# Patient Record
Sex: FEMALE | Race: BLACK OR AFRICAN AMERICAN | Employment: OTHER | URBAN - METROPOLITAN AREA
[De-identification: names, ages, dates, MRNs, and addresses within clinical notes are randomized per-mention and may not be internally consistent; named-entity substitution may affect disease eponyms.]

---

## 2017-01-19 ENCOUNTER — HOSPITAL ENCOUNTER (OUTPATIENT)
Dept: MAMMOGRAPHY | Age: 62
Discharge: HOME | End: 2017-01-19
Attending: FAMILY MEDICINE
Payer: MEDICARE

## 2017-01-19 ENCOUNTER — HOSPITAL ENCOUNTER (OUTPATIENT)
Dept: ULTRASOUND IMAGING | Age: 62
Discharge: HOME | End: 2017-01-19
Attending: FAMILY MEDICINE
Payer: MEDICARE

## 2017-01-19 DIAGNOSIS — R92.8 ABNORMAL MAMMOGRAM OF LEFT BREAST: ICD-10-CM

## 2017-01-19 PROCEDURE — 76642 ULTRASOUND BREAST LIMITED: CPT

## 2017-01-19 PROCEDURE — 77065 DX MAMMO INCL CAD UNI: CPT

## 2017-01-24 ENCOUNTER — HOSPITAL ENCOUNTER (OUTPATIENT)
Dept: CT IMAGING | Age: 62
Discharge: HOME | End: 2017-01-24
Attending: FAMILY MEDICINE
Payer: MEDICARE

## 2017-01-24 ENCOUNTER — APPOINTMENT (OUTPATIENT)
Dept: MAMMOGRAPHY | Age: 62
End: 2017-01-24
Attending: FAMILY MEDICINE
Payer: MEDICARE

## 2017-01-24 DIAGNOSIS — Z80.9 FAMILY HISTORY OF CANCER: ICD-10-CM

## 2017-01-24 LAB — CREAT BLD-MCNC: 1.3 MG/DL (ref 0.6–1.3)

## 2017-01-24 PROCEDURE — 74011000258 HC RX REV CODE- 258: Performed by: FAMILY MEDICINE

## 2017-01-24 PROCEDURE — 74011636320 HC RX REV CODE- 636/320: Performed by: FAMILY MEDICINE

## 2017-01-24 PROCEDURE — 82565 ASSAY OF CREATININE: CPT

## 2017-01-24 PROCEDURE — 71260 CT THORAX DX C+: CPT

## 2017-01-24 PROCEDURE — 74177 CT ABD & PELVIS W/CONTRAST: CPT

## 2017-01-24 RX ORDER — SODIUM CHLORIDE 0.9 % (FLUSH) 0.9 %
10 SYRINGE (ML) INJECTION
Status: COMPLETED | OUTPATIENT
Start: 2017-01-24 | End: 2017-01-24

## 2017-01-24 RX ADMIN — SODIUM CHLORIDE 100 ML: 900 INJECTION, SOLUTION INTRAVENOUS at 11:20

## 2017-01-24 RX ADMIN — IOPAMIDOL 100 ML: 755 INJECTION, SOLUTION INTRAVENOUS at 11:20

## 2017-01-24 RX ADMIN — Medication 10 ML: at 11:20

## 2017-01-24 RX ADMIN — IOHEXOL 50 ML: 240 INJECTION, SOLUTION INTRATHECAL; INTRAVASCULAR; INTRAVENOUS; ORAL at 11:20

## 2017-01-26 ENCOUNTER — HOSPITAL ENCOUNTER (OUTPATIENT)
Dept: MAMMOGRAPHY | Age: 62
Discharge: HOME | End: 2017-01-26
Attending: FAMILY MEDICINE
Payer: MEDICARE

## 2017-01-26 DIAGNOSIS — N63.20 BREAST MASS, LEFT: ICD-10-CM

## 2017-01-26 PROCEDURE — 76642 ULTRASOUND BREAST LIMITED: CPT

## 2017-01-26 RX ORDER — LIDOCAINE HYDROCHLORIDE AND EPINEPHRINE 10; 10 MG/ML; UG/ML
8 INJECTION, SOLUTION INFILTRATION; PERINEURAL ONCE
Status: DISCONTINUED | OUTPATIENT
Start: 2017-01-26 | End: 2017-01-26

## 2017-01-26 RX ORDER — LIDOCAINE HYDROCHLORIDE 10 MG/ML
12 INJECTION INFILTRATION; PERINEURAL ONCE
Status: DISCONTINUED | OUTPATIENT
Start: 2017-01-26 | End: 2017-01-26

## 2017-01-26 NOTE — PROGRESS NOTES
Lt breast bx cx'd by Dr Gutierres Foil rad note). F/u ultrasound lt breast recommended in 1 month-scheduled.

## 2017-02-19 ENCOUNTER — APPOINTMENT (OUTPATIENT)
Dept: GENERAL RADIOLOGY | Age: 62
End: 2017-02-19
Attending: EMERGENCY MEDICINE
Payer: MEDICARE

## 2017-02-19 ENCOUNTER — APPOINTMENT (OUTPATIENT)
Dept: CT IMAGING | Age: 62
End: 2017-02-19
Attending: EMERGENCY MEDICINE
Payer: MEDICARE

## 2017-02-19 ENCOUNTER — HOSPITAL ENCOUNTER (EMERGENCY)
Age: 62
Discharge: HOME | End: 2017-02-19
Attending: EMERGENCY MEDICINE
Payer: MEDICARE

## 2017-02-19 VITALS
DIASTOLIC BLOOD PRESSURE: 70 MMHG | TEMPERATURE: 99 F | WEIGHT: 220 LBS | HEART RATE: 70 BPM | RESPIRATION RATE: 16 BRPM | HEIGHT: 68 IN | BODY MASS INDEX: 33.34 KG/M2 | SYSTOLIC BLOOD PRESSURE: 146 MMHG | OXYGEN SATURATION: 96 %

## 2017-02-19 DIAGNOSIS — T14.90XA BLUNT TRAUMA: Primary | ICD-10-CM

## 2017-02-19 DIAGNOSIS — M79.18 MUSCULOSKELETAL PAIN: ICD-10-CM

## 2017-02-19 PROCEDURE — 73610 X-RAY EXAM OF ANKLE: CPT

## 2017-02-19 PROCEDURE — L0172 CERV COL SR FOAM 2PC PRE OTS: HCPCS

## 2017-02-19 PROCEDURE — 70450 CT HEAD/BRAIN W/O DYE: CPT

## 2017-02-19 PROCEDURE — 71020 XR CHEST PA LAT: CPT

## 2017-02-19 PROCEDURE — 74011250637 HC RX REV CODE- 250/637: Performed by: EMERGENCY MEDICINE

## 2017-02-19 PROCEDURE — 72125 CT NECK SPINE W/O DYE: CPT

## 2017-02-19 PROCEDURE — 99284 EMERGENCY DEPT VISIT MOD MDM: CPT

## 2017-02-19 RX ORDER — ACETAMINOPHEN 325 MG/1
650 TABLET ORAL
Status: COMPLETED | OUTPATIENT
Start: 2017-02-19 | End: 2017-02-19

## 2017-02-19 RX ORDER — TRAMADOL HYDROCHLORIDE 50 MG/1
50 TABLET ORAL
Qty: 15 TAB | Refills: 0 | Status: SHIPPED | OUTPATIENT
Start: 2017-02-19 | End: 2017-03-01

## 2017-02-19 RX ADMIN — ACETAMINOPHEN 650 MG: 325 TABLET, FILM COATED ORAL at 15:43
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
## 2017-02-19 NOTE — ED NOTES
Pt assisted with getting undressed and changed into gown for exam, call farrell within reach, Dr Chino Qureshi bedside evaluating patient
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
## 2017-03-01 ENCOUNTER — HOSPITAL ENCOUNTER (OUTPATIENT)
Dept: MAMMOGRAPHY | Age: 62
Discharge: HOME | End: 2017-03-01
Attending: FAMILY MEDICINE
Payer: MEDICARE

## 2017-03-01 DIAGNOSIS — N63.20 MASS OF BREAST, LEFT: ICD-10-CM

## 2017-03-01 PROCEDURE — 76642 ULTRASOUND BREAST LIMITED: CPT

## 2017-05-08 ENCOUNTER — OFFICE VISIT (OUTPATIENT)
Dept: NEUROLOGY | Age: 62
End: 2017-05-08

## 2017-05-08 VITALS
SYSTOLIC BLOOD PRESSURE: 138 MMHG | HEART RATE: 79 BPM | HEIGHT: 68 IN | OXYGEN SATURATION: 98 % | DIASTOLIC BLOOD PRESSURE: 70 MMHG | WEIGHT: 227 LBS | BODY MASS INDEX: 34.4 KG/M2

## 2017-05-08 DIAGNOSIS — I63.9 CEREBROVASCULAR ACCIDENT (CVA), UNSPECIFIED MECHANISM (HCC): Primary | ICD-10-CM

## 2017-05-08 PROBLEM — I10 HYPERTENSION: Status: ACTIVE | Noted: 2017-05-08

## 2017-05-08 PROBLEM — J98.4 CHRONIC LUNG DISEASE: Status: ACTIVE | Noted: 2017-05-08

## 2017-05-08 RX ORDER — TRAMADOL HYDROCHLORIDE 50 MG/1
100 TABLET ORAL DAILY
COMMUNITY
End: 2018-01-24

## 2017-05-08 NOTE — MR AVS SNAPSHOT
Visit Information Date & Time Provider Department Dept. Phone Encounter #  
 5/8/2017  3:00 PM Zohra Rosario MD [facility] [phone] [id number] Follow-up Instructions Return if symptoms worsen or fail to improve. Upcoming Health Maintenance Date Due Hepatitis C Screening 1955 DTaP/Tdap/Td series (1 - Tdap) 1/12/1976 FOBT Q 1 YEAR AGE 50-75 1/12/2005 ZOSTER VACCINE AGE 60> 1/12/2015 PAP AKA CERVICAL CYTOLOGY 1/9/2017 INFLUENZA AGE 9 TO ADULT 8/1/2017 BREAST CANCER SCRN MAMMOGRAM 1/19/2019 Allergies as of 5/8/2017  Review Complete On: 5/8/2017 By: Zohra Rosario MD  
  
 Severity Noted Reaction Type Reactions Pcn [Penicillins]  01/09/2014    Unknown (comments) Pt's states she doesn't know reaction. Current Immunizations  Never Reviewed No immunizations on file. Not reviewed this visit You Were Diagnosed With   
  
 Codes Comments Cerebrovascular accident (CVA), unspecified mechanism ([facility]    -  Primary ICD-10-CM: I63.9 ICD-9-CM: 434.91 Vitals BP Pulse Height(growth percentile) Weight(growth percentile) LMP SpO2  
 138/70 79 5' 8\" (1.727 m) 227 lb (103 kg) 01/09/2011 98% BMI OB Status Smoking Status 34.52 kg/m2 Postmenopausal Former Smoker Vitals History BMI and BSA Data Body Mass Index Body Surface Area 34.52 kg/m 2 2.22 m 2 Your Updated Medication List  
  
   
This list is accurate as of: 5/8/17  3:46 PM.  Always use your most recent med list.  
  
  
  
  
 aspirin 325 mg tablet Commonly known as:  ASPIRIN Take 325 mg by mouth daily. atorvastatin 10 mg tablet Commonly known as:  LIPITOR Take 10 mg by mouth daily. benzonatate 100 mg capsule Commonly known as:  TESSALON Take 100 mg by mouth two (2) times daily as needed for Cough. CENTRUM SILVER ULTRA WOMEN'S PO Take  by mouth. ergocalciferol 50,000 unit capsule Commonly known as:  ERGOCALCIFEROL Take 50,000 Units by mouth every seven (7) days. fluticasone 50 mcg/actuation inhaler Commonly known as:  FLOVENT DISKUS Take  by inhalation. furosemide 20 mg tablet Commonly known as:  LASIX Take 20 mg by mouth daily. Mon.  Wed.  fri.  
  
 gabapentin 300 mg capsule Commonly known as:  NEURONTIN Take 300 mg by mouth. HYDROcodone-acetaminophen 5-325 mg per tablet Commonly known as:  Monty Talley Take 1 tablet by mouth every four (4) hours as needed for Pain. lisinopril-hydroCHLOROthiazide 10-12.5 mg per tablet Commonly known as:  Alex Stank Take  by mouth daily. meloxicam 15 mg tablet Commonly known as:  MOBIC Take 15 mg by mouth daily. metoprolol tartrate 50 mg tablet Commonly known as:  LOPRESSOR Take 50 mg by mouth two (2) times a day. oxybutynin 5 mg tablet Commonly known as:  GCNHPITE Take 5 mg by mouth three (3) times daily. PLAVIX 75 mg Tab Generic drug:  clopidogrel Take 75 mg by mouth daily. traMADol 50 mg tablet Commonly known as:  ULTRAM  
Take 50 mg by mouth three (3) times daily. Patient takes 3 times a day Follow-up Instructions Return if symptoms worsen or fail to improve. Patient Instructions Stroke: Care Instructions Your Care Instructions You have had a stroke. This means that the blood flow to a part of your brain was blocked for some time, which damages the nerve cells in that part of the brain. The part of your body controlled by that part of your brain may not function properly now. The brain is an amazing organ that can heal itself to some degree. The stroke you had damaged part of your brain. But other parts of your brain may take over in some way for the damaged areas. You have already started this process.  
Your doctor will talk with you about what you can do to prevent another stroke. High blood pressure, high cholesterol, and diabetes are all risk factors for stroke. If you have any of these conditions, work with your doctor to make sure they are under control. Other risk factors for stroke include being overweight, smoking, and not getting regular exercise. Going home may be hard for you and your family. The more you can try to do for yourself, the better. Remember to take each day one at a time. Follow-up care is a key part of your treatment and safety. Be sure to make and go to all appointments, and call your doctor if you are having problems. It's also a good idea to know your test results and keep a list of the medicines you take. How can you care for yourself at home? · Enter a stroke rehabilitation (rehab) program, if your doctor recommends it. Physical, speech, and occupational therapies can help you manage bathing, dressing, eating, and other basics of daily living. · Do not drive until your doctor says it is okay. · It is normal to feel sad or depressed after a stroke. If these feelings last, talk to your doctor. · If you are having problems with urine leakage, go to the bathroom at regular times, including when you first wake up and at bedtime. Also, limit fluids after dinner. · If you are constipated, drink plenty of fluids, enough so that your urine is light yellow or clear like water. If you have kidney, heart, or liver disease and have to limit fluids, talk with your doctor before you increase the amount of fluids you drink. Set up a regular time for using the toilet. If you continue to have constipation, your doctor may suggest using a bulking agent, such as Metamucil, or a stool softener, laxative, or enema. Medicines · Take your medicines exactly as prescribed. Call your doctor if you think you are having a problem with your medicine. You may be taking several medicines.  ACE (angiotensin-converting enzyme) inhibitors, angiotensin II receptor blockers (ARBs), beta-blockers, diuretics (water pills), and calcium channel blockers control your blood pressure. Statins help lower cholesterol. Your doctor may also prescribe medicines for depression, pain, sleep problems, anxiety, or agitation. · If your doctor has given you a blood thinner to prevent another stroke, be sure you get instructions about how to take your medicine safely. Blood thinners can cause serious bleeding problems. · Do not take any over-the-counter medicines or herbal products without talking to your doctor first. 
· If you take birth control pills or hormone therapy, talk to your doctor about whether they are right for you. For family members and caregivers · Make the home safe. Set up a room so that your loved one does not have to climb stairs. Be sure the bathroom is on the same floor. Move throw rugs and furniture that could cause falls. Make sure that the lighting is good. Put grab bars and seats in tubs and showers. · Find out what your loved one can do and what he or she needs help with. Try not to do things for your loved one that your loved one can do on his or her own. Help him or her learn and practice new skills. · Visit and talk with your loved one often. Try doing activities together that you both enjoy, such as playing cards or board games. Keep in touch with your loved one's friends as much as you can. Encourage them to visit. · Take care of yourself. Do not try to do everything yourself. Ask other family members to help. Eat well, get enough rest, and take time to do things that you enjoy. Keep up with your own doctor visits, and make sure to take your medicines regularly. Get out of the house as much as you can. Join a local support group. Find out if you qualify for home health care visits to help with rehab or for adult day care. When should you call for help? Call 911 anytime you think you may need emergency care. For example, call if: · You have signs of another stroke. These may include: 
¨ Sudden numbness, tingling, weakness, or loss of movement in your face, arm, or leg, especially on only one side of your body. ¨ Sudden vision changes. ¨ Sudden trouble speaking. ¨ Sudden confusion or trouble understanding simple statements. ¨ Sudden problems with walking or balance. ¨ A sudden, severe headache that is different from past headaches. Call 911 even if these symptoms go away in a few minutes. Call your doctor now or seek immediate medical care if: 
· You have new symptoms that may be related to your stroke, such as falls or trouble swallowing. Watch closely for changes in your health, and be sure to contact your doctor if you have any problems. Where can you learn more? Go to http://mario-neil.info/. Enter J546 in the search box to learn more about \"Stroke: Care Instructions. \" Current as of: June 4, 2016 Content Version: 11.2 © 1425-6161 [employer]. Care instructions adapted under license by [employer] (which disclaims liability or warranty for this information). If you have questions about a medical condition or this instruction, always ask your healthcare professional. [place] [address] any warranty or liability for your use of this information. Introducing [facility]! [facility] introduces [employer] patient portal. Now you can access parts of your medical record, email your doctor's office, and request medication refills online. 1. In your internet browser, go to [email or web address] 2. Click on the First Time User? Click Here link in the Sign In box. You will see the New Member Sign Up page. 3. Enter your [employer] Access Code exactly as it appears below. You will not need to use this code after youve completed the sign-up process. If you do not sign up before the expiration date, you must request a new code. · [facility] Access Code: [facility] Expires: 5/14/2017  1:36 PM 
 
4. Enter the last four digits of your Social Security Number (xxxx) and Date of Birth (mm/dd/yyyy) as indicated and click Submit. You will be taken to the next sign-up page. 5. Create a [employer] ID. This will be your [employer] login ID and cannot be changed, so think of one that is secure and easy to remember. 6. Create a [employer] password. You can change your password at any time. 7. Enter your Password Reset Question and Answer. This can be used at a later time if you forget your password. 8. Enter your e-mail address. You will receive e-mail notification when new information is available in [address] 9. Click Sign Up. You can now view and download portions of your medical record. 10. Click the Download Summary menu link to download a portable copy of your medical information. If you have questions, please visit the Frequently Asked Questions section of the [employer] website. Remember, [employer] is NOT to be used for urgent needs. For medical emergencies, dial 911. Now available from your iPhone and Android! Please provide this summary of care documentation to your next provider. Your primary care clinician is listed as Lio Scott. If you have any questions after today's visit, please call [phone].
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
## 2017-05-08 NOTE — PATIENT INSTRUCTIONS
Stroke: Care Instructions  Your Care Instructions    You have had a stroke. This means that the blood flow to a part of your brain was blocked for some time, which damages the nerve cells in that part of the brain. The part of your body controlled by that part of your brain may not function properly now. The brain is an amazing organ that can heal itself to some degree. The stroke you had damaged part of your brain. But other parts of your brain may take over in some way for the damaged areas. You have already started this process. Your doctor will talk with you about what you can do to prevent another stroke. High blood pressure, high cholesterol, and diabetes are all risk factors for stroke. If you have any of these conditions, work with your doctor to make sure they are under control. Other risk factors for stroke include being overweight, smoking, and not getting regular exercise. Going home may be hard for you and your family. The more you can try to do for yourself, the better. Remember to take each day one at a time. Follow-up care is a key part of your treatment and safety. Be sure to make and go to all appointments, and call your doctor if you are having problems. It's also a good idea to know your test results and keep a list of the medicines you take. How can you care for yourself at home? · Enter a stroke rehabilitation (rehab) program, if your doctor recommends it. Physical, speech, and occupational therapies can help you manage bathing, dressing, eating, and other basics of daily living. · Do not drive until your doctor says it is okay. · It is normal to feel sad or depressed after a stroke. If these feelings last, talk to your doctor. · If you are having problems with urine leakage, go to the bathroom at regular times, including when you first wake up and at bedtime. Also, limit fluids after dinner.   · If you are constipated, drink plenty of fluids, enough so that your urine is light yellow or clear like water. If you have kidney, heart, or liver disease and have to limit fluids, talk with your doctor before you increase the amount of fluids you drink. Set up a regular time for using the toilet. If you continue to have constipation, your doctor may suggest using a bulking agent, such as Metamucil, or a stool softener, laxative, or enema. Medicines  · Take your medicines exactly as prescribed. Call your doctor if you think you are having a problem with your medicine. You may be taking several medicines. ACE (angiotensin-converting enzyme) inhibitors, angiotensin II receptor blockers (ARBs), beta-blockers, diuretics (water pills), and calcium channel blockers control your blood pressure. Statins help lower cholesterol. Your doctor may also prescribe medicines for depression, pain, sleep problems, anxiety, or agitation. · If your doctor has given you a blood thinner to prevent another stroke, be sure you get instructions about how to take your medicine safely. Blood thinners can cause serious bleeding problems. · Do not take any over-the-counter medicines or herbal products without talking to your doctor first.  · If you take birth control pills or hormone therapy, talk to your doctor about whether they are right for you. For family members and caregivers  · Make the home safe. Set up a room so that your loved one does not have to climb stairs. Be sure the bathroom is on the same floor. Move throw rugs and furniture that could cause falls. Make sure that the lighting is good. Put grab bars and seats in tubs and showers. · Find out what your loved one can do and what he or she needs help with. Try not to do things for your loved one that your loved one can do on his or her own. Help him or her learn and practice new skills. · Visit and talk with your loved one often. Try doing activities together that you both enjoy, such as playing cards or board games.  Keep in touch with your loved one's friends as much as you can. Encourage them to visit. · Take care of yourself. Do not try to do everything yourself. Ask other family members to help. Eat well, get enough rest, and take time to do things that you enjoy. Keep up with your own doctor visits, and make sure to take your medicines regularly. Get out of the house as much as you can. Join a local support group. Find out if you qualify for home health care visits to help with rehab or for adult day care. When should you call for help? Call 911 anytime you think you may need emergency care. For example, call if:  · You have signs of another stroke. These may include:  ¨ Sudden numbness, tingling, weakness, or loss of movement in your face, arm, or leg, especially on only one side of your body. ¨ Sudden vision changes. ¨ Sudden trouble speaking. ¨ Sudden confusion or trouble understanding simple statements. ¨ Sudden problems with walking or balance. ¨ A sudden, severe headache that is different from past headaches. Call 911 even if these symptoms go away in a few minutes. Call your doctor now or seek immediate medical care if:  · You have new symptoms that may be related to your stroke, such as falls or trouble swallowing. Watch closely for changes in your health, and be sure to contact your doctor if you have any problems. Where can you learn more? Go to http://mario-neil.info/. Enter L320 in the search box to learn more about \"Stroke: Care Instructions. \"  Current as of: June 4, 2016  Content Version: 11.2  © 1123-5924 [employer]. Care instructions adapted under license by [employer] (which disclaims liability or warranty for this information). If you have questions about a medical condition or this instruction, always ask your healthcare professional. [address] any warranty or liability for your use of this information.

## 2017-05-09 ENCOUNTER — DOCUMENTATION ONLY (OUTPATIENT)
Dept: NEUROLOGY | Age: 62
End: 2017-05-09

## 2017-09-14 ENCOUNTER — HOSPITAL ENCOUNTER (OUTPATIENT)
Dept: CT IMAGING | Age: 62
Discharge: HOME | End: 2017-09-14
Attending: FAMILY MEDICINE
Payer: MEDICARE

## 2017-09-14 DIAGNOSIS — E27.8 LEFT ADRENAL MASS (HCC): ICD-10-CM

## 2017-09-14 PROCEDURE — 74011000258 HC RX REV CODE- 258: Performed by: FAMILY MEDICINE

## 2017-09-14 PROCEDURE — 74011636320 HC RX REV CODE- 636/320: Performed by: FAMILY MEDICINE

## 2017-09-14 PROCEDURE — 74170 CT ABD WO CNTRST FLWD CNTRST: CPT

## 2017-09-14 RX ORDER — SODIUM CHLORIDE 0.9 % (FLUSH) 0.9 %
10 SYRINGE (ML) INJECTION
Status: COMPLETED | OUTPATIENT
Start: 2017-09-14 | End: 2017-09-14

## 2017-09-14 RX ADMIN — Medication 10 ML: at 12:25

## 2017-09-14 RX ADMIN — SODIUM CHLORIDE 100 ML: 900 INJECTION, SOLUTION INTRAVENOUS at 12:25

## 2017-09-14 RX ADMIN — IOPAMIDOL 100 ML: 612 INJECTION, SOLUTION INTRAVENOUS at 12:25

## 2018-01-18 ENCOUNTER — OFFICE VISIT (OUTPATIENT)
Dept: GYNECOLOGY | Age: 63
End: 2018-01-18

## 2018-01-18 VITALS — HEIGHT: 68 IN | DIASTOLIC BLOOD PRESSURE: 107 MMHG | SYSTOLIC BLOOD PRESSURE: 138 MMHG

## 2018-01-18 DIAGNOSIS — C55 UTERINE CARCINOSARCOMA (HCC): Primary | ICD-10-CM

## 2018-01-18 PROBLEM — E66.9 OBESITY (BMI 30.0-34.9): Status: ACTIVE | Noted: 2018-01-18

## 2018-01-18 NOTE — PROGRESS NOTES
New patient referred for endometrial cancer. Patient states no abnormal pain, spotting , bleeding today. The pt second bp 138/107.

## 2018-01-18 NOTE — PROGRESS NOTES
[address] [place] [address] Mathias Moritz 066, 2323 [facility] [phone]  F [phone]    Office Note  Patient ID:  Name:  Frank Rodriguez  MRN:  [id number]  :  1955/63 y.o. Date:  2018      HISTORY OF PRESENT ILLNESS:  June Mancel Councilman is a 61 y.o. 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 She is referred by Dr. Boyd Neumann. She presented for evaluation of postmenopausal bleeding. A pelvic ultrasound revealed a thickened endometrium. Her pap smear was AGCUS. She then underwent colposcopy with cervical biopsy, ECC, and EMB. The cervical biopsy and ECC were negative. The EMB revealed carcinosarcoma of the endometrium. Based upon this pathology, I have been asked to see her in consultation for further evaluation and management. ROS:   and GI review:  Negative  Cardiopulmonary review:  Negative   Musculoskeletal:  Negative    A comprehensive review of systems was negative except for that written in the History of Present Illness. , 10 point ROS      OB/GYN ROS:    There is no history of significant gyn problems or procedures.           Problem List:  Patient Active Problem List    Diagnosis Date Noted    Uterine carcinosarcoma ([address] 2018    Obesity (BMI 30.0-34.9) 2018    Hypertension 2017    Chronic lung disease 2017    Stroke ([address] 2017     PMH:  Past Medical History:   Diagnosis Date    Hypercholesterolemia     Hypertension     Stroke ([address] 2007      PSH:  Past Surgical History:   Procedure Laterality Date    COLONOSCOPY,DIAGNOSTIC  2015         HX BREAST BIOPSY Right 1993    right breast:  benign      Social History:  Social History   Substance Use Topics    Smoking status: Former Smoker    Smokeless tobacco: Never Used    Alcohol use Yes      Comment: socially      Family History:  Family History   Problem Relation Age of Onset    Heart Disease Mother     Hypertension Mother     Diabetes Mother     Cancer Mother 62     breast cancer    Breast Cancer Mother      52's    Cancer Father 61     lung cancer    Stroke Brother     Stroke Maternal Grandmother     Stroke Maternal Grandfather       Medications: (reviewed)  Current Outpatient Prescriptions   Medication Sig    traMADol (ULTRAM) 50 mg tablet Take 50 mg by mouth three (3) times daily. Patient takes 3 times a day    aspirin (ASPIRIN) 325 mg tablet Take 325 mg by mouth daily.  atorvastatin (LIPITOR) 10 mg tablet Take 10 mg by mouth daily.  clopidogrel (PLAVIX) 75 mg tablet Take 75 mg by mouth daily.  gabapentin (NEURONTIN) 300 mg capsule Take 300 mg by mouth.  metoprolol (LOPRESSOR) 50 mg tablet Take 50 mg by mouth two (2) times a day.  oxybutynin (DITROPAN) 5 mg tablet Take 5 mg by mouth three (3) times daily.  benzonatate (TESSALON) 100 mg capsule Take 100 mg by mouth two (2) times daily as needed for Cough.  MULTIVITS W-FE,OTHER MIN/LUT (CENTRUM SILVER ULTRA WOMEN'S PO) Take  by mouth.  ergocalciferol (ERGOCALCIFEROL) 50,000 unit capsule Take 50,000 Units by mouth every seven (7) days.  fluticasone (FLOVENT DISKUS) 50 mcg/actuation inhaler Take  by inhalation.  furosemide (LASIX) 20 mg tablet Take 20 mg by mouth daily. Andrew Born.  lisinopril-hydrochlorothiazide (PRINZIDE, ZESTORETIC) 10-12.5 mg per tablet Take  by mouth daily.  meloxicam (MOBIC) 15 mg tablet Take 15 mg by mouth daily.  HYDROcodone-acetaminophen (NORCO) 5-325 mg per tablet Take 1 tablet by mouth every four (4) hours as needed for Pain. No current facility-administered medications for this visit. Allergies: (reviewed)  Allergies   Allergen Reactions    Pcn [Penicillins] Unknown (comments)     Pt's states she doesn't know reaction.           OBJECTIVE:    Physical Exam:  VITAL SIGNS: Vitals:    01/18/18 1009 01/18/18 1040   BP: 140/90 (!) 138/107   Height: 5' 7.99\" (1.727 m) There is no height or weight on file to calculate BMI. GENERAL MELVIN: Conversant, alert, oriented. No acute distress. HEENT: HEENT. No thyroid enlargement. No JVD. Neck: Supple without restrictions. RESPIRATORY: Clear to auscultation and percussion to the bases. No CVAT. CARDIOVASC: RRR without murmur/rub. GASTROINT: soft, non-tender, without masses or organomegaly   MUSCULOSKEL: no joint tenderness, deformity or swelling   EXTREMITIES: extremities normal, atraumatic, no cyanosis or edema   PELVIC: Vulva and vagina appear normal. Bimanual exam reveals normal uterus and adnexa. RECTAL: Deferred   JOHN SURVEY: No suspicious lymphadenopathy or edema noted. NEURO: Grossly intact. No acute deficit. Imaging: Outside pelvic ultrasound from [address] [place] [address] for Women reviewed. Uterus measured 8.1 x 6.1 x 4.4 cm. Endometrial stripe measured 25 mm. Right ovary not seen. Left ovary appeared normal.  No fluid in the cul de sac. IMPRESSION/PLAN:  Luz Sorensen is a 61 y.o. female with a working diagnosis of uterine carcinosarcoma. I reviewed with Luz Romeo her medical records, physical exam, and review of symptoms. I discussed with her the pathology from the biopsy and explained the aggressive nature of carcinosarcomas and the possibility of metastatic disease. I explained the standard of care, which would include hysterectomy with staging. I recommended a robotic approach to hysterectomy. She was counseled on the risks, benefits, indications, and alternatives of surgery. Her questions were answered and she wishes to proceed. Due to the possibility of metastatic disease, I am going to go ahead and obtain a CT of the chest/abdomen/pelvis prior to surgery. We will also check a CA-125.           Signed By: Ochoa Mcmullen MD     1/18/2018/10:28 AM

## 2018-01-18 NOTE — LETTER
2018 11:01 AM 
 
Patient:  Frank Rodriguez YOB: 1955 Date of Visit: 2018 Dear Fly Morocho MD 
[address] [place] [address] [address] [facility] [id number] VIA Facsimile: [phone] Monica Jara MD 
[address] [address] VIA Facsimile: [phone] 
 : 
 
 
Thank you for referring Ms. Frank Rodriguez to me for evaluation/treatment. Below are the relevant portions of my assessment and plan of care. New patient referred for endometrial cancer. Patient states no abnormal pain, spotting , bleeding today. The pt second bp 138/107. 27 [facility], [address] 
P [phone]  F [phone] Office Note Patient ID: 
Name:  Frank Rodriguez MRN:  [id number] :  1955/63 y.o. Date:  2018 HISTORY OF PRESENT ILLNESS: 
June Mancel Councilman is a 61 y.o. 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 She is referred by Dr. Boyd Neumann. She presented for evaluation of postmenopausal bleeding. A pelvic ultrasound revealed a thickened endometrium. Her pap smear was AGCUS. She then underwent colposcopy with cervical biopsy, ECC, and EMB. The cervical biopsy and ECC were negative. The EMB revealed carcinosarcoma of the endometrium. Based upon this pathology, I have been asked to see her in consultation for further evaluation and management. ROS: 
 and GI review:  Negative Cardiopulmonary review:  Negative Musculoskeletal:  Negative A comprehensive review of systems was negative except for that written in the History of Present Illness. , 10 point ROS 
 
 
OB/GYN ROS: 
 There is no history of significant gyn problems or procedures. Problem List: 
Patient Active Problem List  
 Diagnosis Date Noted  Uterine carcinosarcoma ([address] 2018  Obesity (BMI 30.0-34.9) 2018  Hypertension 2017  Chronic lung disease 05/08/2017  Stroke ([address] 05/08/2017 PMH: 
Past Medical History:  
Diagnosis Date  Hypercholesterolemia  Hypertension  Stroke [facility]) 9/2007 PSH: 
Past Surgical History:  
Procedure Laterality Date  COLONOSCOPY,DIAGNOSTIC  4/23/2015  HX BREAST BIOPSY Right 1993  
 right breast:  benign Social History: 
Social History Substance Use Topics  Smoking status: Former Smoker  Smokeless tobacco: Never Used  Alcohol use Yes Comment: socially Family History: 
Family History Problem Relation Age of Onset  Heart Disease Mother  Hypertension Mother  Diabetes Mother  Cancer Mother 62  
  breast cancer  Breast Cancer Mother 50's  Cancer Father 61  
  lung cancer  Stroke Brother  Stroke Maternal Grandmother  Stroke Maternal Grandfather Medications: (reviewed) Current Outpatient Prescriptions Medication Sig  
 traMADol (ULTRAM) 50 mg tablet Take 50 mg by mouth three (3) times daily. Patient takes 3 times a day  aspirin (ASPIRIN) 325 mg tablet Take 325 mg by mouth daily.  atorvastatin (LIPITOR) 10 mg tablet Take 10 mg by mouth daily.  clopidogrel (PLAVIX) 75 mg tablet Take 75 mg by mouth daily.  gabapentin (NEURONTIN) 300 mg capsule Take 300 mg by mouth.  metoprolol (LOPRESSOR) 50 mg tablet Take 50 mg by mouth two (2) times a day.  oxybutynin (DITROPAN) 5 mg tablet Take 5 mg by mouth three (3) times daily.  benzonatate (TESSALON) 100 mg capsule Take 100 mg by mouth two (2) times daily as needed for Cough.  MULTIVITS W-FE,OTHER MIN/LUT (CENTRUM SILVER ULTRA WOMEN'S PO) Take  by mouth.  ergocalciferol (ERGOCALCIFEROL) 50,000 unit capsule Take 50,000 Units by mouth every seven (7) days.  fluticasone (FLOVENT DISKUS) 50 mcg/actuation inhaler Take  by inhalation.  furosemide (LASIX) 20 mg tablet Take 20 mg by mouth daily. Darrian Whitmore  lisinopril-hydrochlorothiazide (PRINZIDE, ZESTORETIC) 10-12.5 mg per tablet Take  by mouth daily.  meloxicam (MOBIC) 15 mg tablet Take 15 mg by mouth daily.  HYDROcodone-acetaminophen (NORCO) 5-325 mg per tablet Take 1 tablet by mouth every four (4) hours as needed for Pain. No current facility-administered medications for this visit. Allergies: (reviewed) Allergies Allergen Reactions  Pcn [Penicillins] Unknown (comments) Pt's states she doesn't know reaction. OBJECTIVE: 
 
Physical Exam: VITAL SIGNS: Vitals:  
 01/18/18 1009 01/18/18 1040 BP: 140/90 (!) 138/107 Height: 5' 7.99\" (1.727 m) There is no height or weight on file to calculate BMI. GENERAL MELVIN: Conversant, alert, oriented. No acute distress. HEENT: HEENT. No thyroid enlargement. No JVD. Neck: Supple without restrictions. RESPIRATORY: Clear to auscultation and percussion to the bases. No CVAT. CARDIOVASC: RRR without murmur/rub. GASTROINT: soft, non-tender, without masses or organomegaly MUSCULOSKEL: no joint tenderness, deformity or swelling EXTREMITIES: extremities normal, atraumatic, no cyanosis or edema PELVIC: Vulva and vagina appear normal. Bimanual exam reveals normal uterus and adnexa. RECTAL: Deferred JOHN SURVEY: No suspicious lymphadenopathy or edema noted. NEURO: Grossly intact. No acute deficit. Imaging: Outside pelvic ultrasound from [address] [place] [address] for Women reviewed. Uterus measured 8.1 x 6.1 x 4.4 cm. Endometrial stripe measured 25 mm. Right ovary not seen. Left ovary appeared normal.  No fluid in the cul de sac. IMPRESSION/PLAN: 
Luz Shepherd is a 61 y.o. female with a working diagnosis of uterine carcinosarcoma. I reviewed with Luz Romeo her medical records, physical exam, and review of symptoms.   I discussed with her the pathology from the biopsy and explained the aggressive nature of carcinosarcomas and the possibility of metastatic disease. I explained the standard of care, which would include hysterectomy with staging. I recommended a robotic approach to hysterectomy. She was counseled on the risks, benefits, indications, and alternatives of surgery. Her questions were answered and she wishes to proceed. Due to the possibility of metastatic disease, I am going to go ahead and obtain a CT of the chest/abdomen/pelvis prior to surgery. We will also check a CA-125. Signed By: Graciela Priest MD   
 1/18/2018/10:28 AM  
 
 
 
If you have questions, please do not hesitate to call me. I look forward to following Ms. Jordan Collins along with you.  
 
 
 
Sincerely, 
 
 
Graciela Priest MD

## 2018-01-22 ENCOUNTER — HOSPITAL ENCOUNTER (OUTPATIENT)
Dept: CT IMAGING | Age: 63
Discharge: HOME | End: 2018-01-22
Attending: OBSTETRICS & GYNECOLOGY
Payer: MEDICARE

## 2018-01-22 DIAGNOSIS — C55 UTERINE CARCINOSARCOMA (HCC): ICD-10-CM

## 2018-01-22 LAB — CREAT BLD-MCNC: 1.1 MG/DL (ref 0.6–1.3)

## 2018-01-22 PROCEDURE — 74177 CT ABD & PELVIS W/CONTRAST: CPT

## 2018-01-22 PROCEDURE — 71260 CT THORAX DX C+: CPT

## 2018-01-22 PROCEDURE — 82565 ASSAY OF CREATININE: CPT

## 2018-01-22 PROCEDURE — 74011636320 HC RX REV CODE- 636/320: Performed by: OBSTETRICS & GYNECOLOGY

## 2018-01-22 PROCEDURE — 74011000258 HC RX REV CODE- 258: Performed by: OBSTETRICS & GYNECOLOGY

## 2018-01-22 RX ORDER — SODIUM CHLORIDE 0.9 % (FLUSH) 0.9 %
10 SYRINGE (ML) INJECTION
Status: COMPLETED | OUTPATIENT
Start: 2018-01-22 | End: 2018-01-22

## 2018-01-22 RX ADMIN — IOPAMIDOL 100 ML: 755 INJECTION, SOLUTION INTRAVENOUS at 14:53

## 2018-01-22 RX ADMIN — Medication 10 ML: at 14:53

## 2018-01-22 RX ADMIN — SODIUM CHLORIDE 100 ML: 900 INJECTION, SOLUTION INTRAVENOUS at 14:53

## 2018-01-22 RX ADMIN — IOHEXOL 50 ML: 240 INJECTION, SOLUTION INTRATHECAL; INTRAVASCULAR; INTRAVENOUS; ORAL at 14:53

## 2018-01-24 ENCOUNTER — HOSPITAL ENCOUNTER (OUTPATIENT)
Dept: PREADMISSION TESTING | Age: 63
Discharge: HOME | End: 2018-01-24
Payer: MEDICARE

## 2018-01-24 VITALS
RESPIRATION RATE: 20 BRPM | HEIGHT: 69 IN | WEIGHT: 254 LBS | TEMPERATURE: 98.4 F | SYSTOLIC BLOOD PRESSURE: 138 MMHG | DIASTOLIC BLOOD PRESSURE: 82 MMHG | BODY MASS INDEX: 37.62 KG/M2 | HEART RATE: 67 BPM

## 2018-01-24 LAB
ALBUMIN SERPL-MCNC: 3.6 G/DL (ref 3.5–5)
ALBUMIN/GLOB SERPL: 1 {RATIO} (ref 1.1–2.2)
ALP SERPL-CCNC: 111 U/L (ref 45–117)
ALT SERPL-CCNC: 16 U/L (ref 12–78)
ANION GAP SERPL CALC-SCNC: 5 MMOL/L (ref 5–15)
AST SERPL-CCNC: 14 U/L (ref 15–37)
ATRIAL RATE: 59 BPM
BASOPHILS # BLD: 0 K/UL (ref 0–0.1)
BASOPHILS NFR BLD: 0 % (ref 0–1)
BILIRUB SERPL-MCNC: 0.9 MG/DL (ref 0.2–1)
BUN SERPL-MCNC: 18 MG/DL (ref 6–20)
BUN/CREAT SERPL: 15 (ref 12–20)
CALCIUM SERPL-MCNC: 8.6 MG/DL (ref 8.5–10.1)
CALCULATED P AXIS, ECG09: 49 DEGREES
CALCULATED R AXIS, ECG10: 17 DEGREES
CALCULATED T AXIS, ECG11: 2 DEGREES
CHLORIDE SERPL-SCNC: 107 MMOL/L (ref 97–108)
CO2 SERPL-SCNC: 28 MMOL/L (ref 21–32)
CREAT SERPL-MCNC: 1.24 MG/DL (ref 0.55–1.02)
DIAGNOSIS, 93000: NORMAL
EOSINOPHIL # BLD: 0.5 K/UL (ref 0–0.4)
EOSINOPHIL NFR BLD: 7 % (ref 0–7)
ERYTHROCYTE [DISTWIDTH] IN BLOOD BY AUTOMATED COUNT: 13.9 % (ref 11.5–14.5)
GLOBULIN SER CALC-MCNC: 3.6 G/DL (ref 2–4)
GLUCOSE SERPL-MCNC: 64 MG/DL (ref 65–100)
HCT VFR BLD AUTO: 41.9 % (ref 35–47)
HGB BLD-MCNC: 13.5 G/DL (ref 11.5–16)
LYMPHOCYTES # BLD: 2.2 K/UL (ref 0.8–3.5)
LYMPHOCYTES NFR BLD: 30 % (ref 12–49)
MCH RBC QN AUTO: 29.9 PG (ref 26–34)
MCHC RBC AUTO-ENTMCNC: 32.2 G/DL (ref 30–36.5)
MCV RBC AUTO: 92.7 FL (ref 80–99)
MONOCYTES # BLD: 0.5 K/UL (ref 0–1)
MONOCYTES NFR BLD: 7 % (ref 5–13)
NEUTS SEG # BLD: 4.1 K/UL (ref 1.8–8)
NEUTS SEG NFR BLD: 56 % (ref 32–75)
P-R INTERVAL, ECG05: 172 MS
PLATELET # BLD AUTO: 317 K/UL (ref 150–400)
POTASSIUM SERPL-SCNC: 4.2 MMOL/L (ref 3.5–5.1)
PROT SERPL-MCNC: 7.2 G/DL (ref 6.4–8.2)
Q-T INTERVAL, ECG07: 414 MS
QRS DURATION, ECG06: 86 MS
QTC CALCULATION (BEZET), ECG08: 409 MS
RBC # BLD AUTO: 4.52 M/UL (ref 3.8–5.2)
SODIUM SERPL-SCNC: 140 MMOL/L (ref 136–145)
VENTRICULAR RATE, ECG03: 59 BPM
WBC # BLD AUTO: 7.3 K/UL (ref 3.6–11)

## 2018-01-24 PROCEDURE — 86304 IMMUNOASSAY TUMOR CA 125: CPT | Performed by: OBSTETRICS & GYNECOLOGY

## 2018-01-24 PROCEDURE — 93005 ELECTROCARDIOGRAM TRACING: CPT

## 2018-01-24 PROCEDURE — 80053 COMPREHEN METABOLIC PANEL: CPT | Performed by: OBSTETRICS & GYNECOLOGY

## 2018-01-24 PROCEDURE — 85025 COMPLETE CBC W/AUTO DIFF WBC: CPT | Performed by: OBSTETRICS & GYNECOLOGY

## 2018-01-24 RX ORDER — AMLODIPINE BESYLATE 10 MG/1
5 TABLET ORAL DAILY
COMMUNITY

## 2018-01-24 RX ORDER — MECLIZINE HYDROCHLORIDE 25 MG/1
25 TABLET ORAL AS NEEDED
COMMUNITY

## 2018-01-24 RX ORDER — TRAMADOL HYDROCHLORIDE 50 MG/1
50 TABLET ORAL
COMMUNITY

## 2018-01-24 NOTE — PERIOP NOTES
PATIENT GIVEN SURGICAL SITE INFECTION FAQ HANDOUT AND HAND WASHING TIP SHEET. PREOP INSTRUCTIONS REVIEWED AND PATIENT VERBALIZES UNDERSTANDING OF INSTRUCTIONS. PATIENT HAS BEEN GIVEN THE OPPORTUNITY TO ASK QUESTIONS.  CHG WIPES C INSTRUCTIONS WERE GIVEN TO THE PT.

## 2018-01-26 LAB — CANCER AG125 SERPL-ACNC: 19 U/ML (ref 0–30)

## 2018-01-31 ENCOUNTER — ANESTHESIA EVENT (OUTPATIENT)
Dept: SURGERY | Age: 63
End: 2018-01-31
Payer: MEDICARE

## 2018-01-31 ENCOUNTER — HOSPITAL ENCOUNTER (OUTPATIENT)
Age: 63
Setting detail: OBSERVATION
Discharge: HOME | End: 2018-02-01
Attending: OBSTETRICS & GYNECOLOGY | Admitting: OBSTETRICS & GYNECOLOGY
Payer: MEDICARE

## 2018-01-31 ENCOUNTER — ANESTHESIA (OUTPATIENT)
Dept: SURGERY | Age: 63
End: 2018-01-31
Payer: MEDICARE

## 2018-01-31 DIAGNOSIS — G89.18 POSTOPERATIVE PAIN: Primary | ICD-10-CM

## 2018-01-31 DIAGNOSIS — C55 UTERINE CARCINOSARCOMA (HCC): ICD-10-CM

## 2018-01-31 PROBLEM — R79.89 ELEVATED SERUM CREATININE: Status: ACTIVE | Noted: 2018-01-31

## 2018-01-31 PROCEDURE — 76060000036 HC ANESTHESIA 2.5 TO 3 HR: Performed by: OBSTETRICS & GYNECOLOGY

## 2018-01-31 PROCEDURE — 77030020782 HC GWN BAIR PAWS FLX 3M -B

## 2018-01-31 PROCEDURE — 77030003580 HC NDL INSUF VERES J&J -B: Performed by: OBSTETRICS & GYNECOLOGY

## 2018-01-31 PROCEDURE — 77030018778 HC MANIP UTER VCAR CNMD -B: Performed by: OBSTETRICS & GYNECOLOGY

## 2018-01-31 PROCEDURE — 77030008684 HC TU ET CUF COVD -B: Performed by: ANESTHESIOLOGY

## 2018-01-31 PROCEDURE — 77030003666 HC NDL SPINAL BD -A: Performed by: OBSTETRICS & GYNECOLOGY

## 2018-01-31 PROCEDURE — 77030010507 HC ADH SKN DERMBND J&J -B: Performed by: OBSTETRICS & GYNECOLOGY

## 2018-01-31 PROCEDURE — 77030013079 HC BLNKT BAIR HGGR 3M -A: Performed by: ANESTHESIOLOGY

## 2018-01-31 PROCEDURE — 77030002934 HC SUT MCRYL J&J -B: Performed by: OBSTETRICS & GYNECOLOGY

## 2018-01-31 PROCEDURE — 77030031492 HC PRT ACC BLNT AIRSEAL CNMD -B: Performed by: OBSTETRICS & GYNECOLOGY

## 2018-01-31 PROCEDURE — 77030020263 HC SOL INJ SOD CL0.9% LFCR 1000ML: Performed by: OBSTETRICS & GYNECOLOGY

## 2018-01-31 PROCEDURE — 74011000258 HC RX REV CODE- 258

## 2018-01-31 PROCEDURE — 77010033678 HC OXYGEN DAILY

## 2018-01-31 PROCEDURE — 77030032490 HC SLV COMPR SCD KNE COVD -B: Performed by: OBSTETRICS & GYNECOLOGY

## 2018-01-31 PROCEDURE — 74011250636 HC RX REV CODE- 250/636

## 2018-01-31 PROCEDURE — 88342 IMHCHEM/IMCYTCHM 1ST ANTB: CPT | Performed by: OBSTETRICS & GYNECOLOGY

## 2018-01-31 PROCEDURE — 88305 TISSUE EXAM BY PATHOLOGIST: CPT | Performed by: OBSTETRICS & GYNECOLOGY

## 2018-01-31 PROCEDURE — 77030011640 HC PAD GRND REM COVD -A: Performed by: OBSTETRICS & GYNECOLOGY

## 2018-01-31 PROCEDURE — 74011250636 HC RX REV CODE- 250/636: Performed by: OBSTETRICS & GYNECOLOGY

## 2018-01-31 PROCEDURE — 77030005518 HC CATH URETH FOL 2W BARD -B: Performed by: OBSTETRICS & GYNECOLOGY

## 2018-01-31 PROCEDURE — 74011250637 HC RX REV CODE- 250/637: Performed by: OBSTETRICS & GYNECOLOGY

## 2018-01-31 PROCEDURE — 77030008756 HC TU IRR SUC STRY -B: Performed by: OBSTETRICS & GYNECOLOGY

## 2018-01-31 PROCEDURE — 74011250636 HC RX REV CODE- 250/636: Performed by: ANESTHESIOLOGY

## 2018-01-31 PROCEDURE — 88112 CYTOPATH CELL ENHANCE TECH: CPT | Performed by: OBSTETRICS & GYNECOLOGY

## 2018-01-31 PROCEDURE — 76010000877 HC OR TIME 2.5 TO 3HR INTENSV - TIER 2: Performed by: OBSTETRICS & GYNECOLOGY

## 2018-01-31 PROCEDURE — 77030002933 HC SUT MCRYL J&J -A: Performed by: OBSTETRICS & GYNECOLOGY

## 2018-01-31 PROCEDURE — 76210000000 HC OR PH I REC 2 TO 2.5 HR: Performed by: OBSTETRICS & GYNECOLOGY

## 2018-01-31 PROCEDURE — 77030035277 HC OBTRTR BLDELSS DISP INTU -B: Performed by: OBSTETRICS & GYNECOLOGY

## 2018-01-31 PROCEDURE — 77030007956 HC PCH ENDOSC SPEC COVD -C: Performed by: OBSTETRICS & GYNECOLOGY

## 2018-01-31 PROCEDURE — 99218 HC RM OBSERVATION: CPT

## 2018-01-31 PROCEDURE — 77030016151 HC PROTCTR LNS DFOG COVD -B: Performed by: OBSTETRICS & GYNECOLOGY

## 2018-01-31 PROCEDURE — 77030026438 HC STYL ET INTUB CARD -A: Performed by: ANESTHESIOLOGY

## 2018-01-31 PROCEDURE — 77030020703 HC SEAL CANN DISP INTU -B: Performed by: OBSTETRICS & GYNECOLOGY

## 2018-01-31 PROCEDURE — 77030037241 HC PRT ACC BLDLSS AIRSEAL CNMD -B: Performed by: OBSTETRICS & GYNECOLOGY

## 2018-01-31 PROCEDURE — 77030018836 HC SOL IRR NACL ICUM -A: Performed by: OBSTETRICS & GYNECOLOGY

## 2018-01-31 PROCEDURE — 74011000250 HC RX REV CODE- 250

## 2018-01-31 PROCEDURE — 74011000254 HC RX REV CODE- 254: Performed by: OBSTETRICS & GYNECOLOGY

## 2018-01-31 PROCEDURE — 88309 TISSUE EXAM BY PATHOLOGIST: CPT | Performed by: OBSTETRICS & GYNECOLOGY

## 2018-01-31 PROCEDURE — 88307 TISSUE EXAM BY PATHOLOGIST: CPT | Performed by: OBSTETRICS & GYNECOLOGY

## 2018-01-31 RX ORDER — ROCURONIUM BROMIDE 10 MG/ML
INJECTION, SOLUTION INTRAVENOUS AS NEEDED
Status: DISCONTINUED | OUTPATIENT
Start: 2018-01-31 | End: 2018-01-31 | Stop reason: HOSPADM

## 2018-01-31 RX ORDER — ONDANSETRON 2 MG/ML
4 INJECTION INTRAMUSCULAR; INTRAVENOUS AS NEEDED
Status: DISCONTINUED | OUTPATIENT
Start: 2018-01-31 | End: 2018-01-31 | Stop reason: HOSPADM

## 2018-01-31 RX ORDER — PROCHLORPERAZINE EDISYLATE 5 MG/ML
5 INJECTION INTRAMUSCULAR; INTRAVENOUS
Status: DISCONTINUED | OUTPATIENT
Start: 2018-01-31 | End: 2018-01-31 | Stop reason: CLARIF

## 2018-01-31 RX ORDER — CLOPIDOGREL BISULFATE 75 MG/1
75 TABLET ORAL DAILY
Status: DISCONTINUED | OUTPATIENT
Start: 2018-02-01 | End: 2018-02-01 | Stop reason: HOSPADM

## 2018-01-31 RX ORDER — SUCCINYLCHOLINE CHLORIDE 20 MG/ML
INJECTION INTRAMUSCULAR; INTRAVENOUS AS NEEDED
Status: DISCONTINUED | OUTPATIENT
Start: 2018-01-31 | End: 2018-01-31 | Stop reason: HOSPADM

## 2018-01-31 RX ORDER — DIPHENHYDRAMINE HYDROCHLORIDE 50 MG/ML
12.5 INJECTION, SOLUTION INTRAMUSCULAR; INTRAVENOUS AS NEEDED
Status: DISCONTINUED | OUTPATIENT
Start: 2018-01-31 | End: 2018-01-31 | Stop reason: HOSPADM

## 2018-01-31 RX ORDER — NEOSTIGMINE METHYLSULFATE 1 MG/ML
INJECTION INTRAVENOUS AS NEEDED
Status: DISCONTINUED | OUTPATIENT
Start: 2018-01-31 | End: 2018-01-31 | Stop reason: HOSPADM

## 2018-01-31 RX ORDER — KETOROLAC TROMETHAMINE 30 MG/ML
15 INJECTION, SOLUTION INTRAMUSCULAR; INTRAVENOUS
Status: DISCONTINUED | OUTPATIENT
Start: 2018-01-31 | End: 2018-02-01 | Stop reason: HOSPADM

## 2018-01-31 RX ORDER — GLYCOPYRROLATE 0.2 MG/ML
INJECTION INTRAMUSCULAR; INTRAVENOUS AS NEEDED
Status: DISCONTINUED | OUTPATIENT
Start: 2018-01-31 | End: 2018-01-31 | Stop reason: HOSPADM

## 2018-01-31 RX ORDER — LIDOCAINE HYDROCHLORIDE 20 MG/ML
INJECTION, SOLUTION EPIDURAL; INFILTRATION; INTRACAUDAL; PERINEURAL AS NEEDED
Status: DISCONTINUED | OUTPATIENT
Start: 2018-01-31 | End: 2018-01-31 | Stop reason: HOSPADM

## 2018-01-31 RX ORDER — EPHEDRINE SULFATE 50 MG/ML
INJECTION, SOLUTION INTRAVENOUS AS NEEDED
Status: DISCONTINUED | OUTPATIENT
Start: 2018-01-31 | End: 2018-01-31 | Stop reason: HOSPADM

## 2018-01-31 RX ORDER — MIDAZOLAM HYDROCHLORIDE 1 MG/ML
1 INJECTION, SOLUTION INTRAMUSCULAR; INTRAVENOUS AS NEEDED
Status: DISCONTINUED | OUTPATIENT
Start: 2018-01-31 | End: 2018-01-31 | Stop reason: HOSPADM

## 2018-01-31 RX ORDER — OXYBUTYNIN CHLORIDE 5 MG/1
15 TABLET ORAL 2 TIMES DAILY
Status: DISCONTINUED | OUTPATIENT
Start: 2018-01-31 | End: 2018-02-01 | Stop reason: HOSPADM

## 2018-01-31 RX ORDER — ONDANSETRON 2 MG/ML
INJECTION INTRAMUSCULAR; INTRAVENOUS AS NEEDED
Status: DISCONTINUED | OUTPATIENT
Start: 2018-01-31 | End: 2018-01-31 | Stop reason: HOSPADM

## 2018-01-31 RX ORDER — LIDOCAINE HYDROCHLORIDE 10 MG/ML
0.1 INJECTION, SOLUTION EPIDURAL; INFILTRATION; INTRACAUDAL; PERINEURAL AS NEEDED
Status: DISCONTINUED | OUTPATIENT
Start: 2018-01-31 | End: 2018-01-31 | Stop reason: HOSPADM

## 2018-01-31 RX ORDER — FENTANYL CITRATE 50 UG/ML
INJECTION, SOLUTION INTRAMUSCULAR; INTRAVENOUS AS NEEDED
Status: DISCONTINUED | OUTPATIENT
Start: 2018-01-31 | End: 2018-01-31 | Stop reason: HOSPADM

## 2018-01-31 RX ORDER — ONDANSETRON 2 MG/ML
4 INJECTION INTRAMUSCULAR; INTRAVENOUS
Status: DISCONTINUED | OUTPATIENT
Start: 2018-01-31 | End: 2018-02-01 | Stop reason: HOSPADM

## 2018-01-31 RX ORDER — DEXAMETHASONE SODIUM PHOSPHATE 4 MG/ML
INJECTION, SOLUTION INTRA-ARTICULAR; INTRALESIONAL; INTRAMUSCULAR; INTRAVENOUS; SOFT TISSUE AS NEEDED
Status: DISCONTINUED | OUTPATIENT
Start: 2018-01-31 | End: 2018-01-31 | Stop reason: HOSPADM

## 2018-01-31 RX ORDER — TRAMADOL HYDROCHLORIDE 50 MG/1
100 TABLET ORAL DAILY
Status: DISCONTINUED | OUTPATIENT
Start: 2018-02-01 | End: 2018-02-01 | Stop reason: HOSPADM

## 2018-01-31 RX ORDER — MORPHINE SULFATE 10 MG/ML
2 INJECTION, SOLUTION INTRAMUSCULAR; INTRAVENOUS
Status: DISCONTINUED | OUTPATIENT
Start: 2018-01-31 | End: 2018-01-31 | Stop reason: HOSPADM

## 2018-01-31 RX ORDER — PROPOFOL 10 MG/ML
INJECTION, EMULSION INTRAVENOUS AS NEEDED
Status: DISCONTINUED | OUTPATIENT
Start: 2018-01-31 | End: 2018-01-31 | Stop reason: HOSPADM

## 2018-01-31 RX ORDER — ACETAMINOPHEN 10 MG/ML
INJECTION, SOLUTION INTRAVENOUS AS NEEDED
Status: DISCONTINUED | OUTPATIENT
Start: 2018-01-31 | End: 2018-01-31 | Stop reason: HOSPADM

## 2018-01-31 RX ORDER — DIPHENHYDRAMINE HYDROCHLORIDE 50 MG/ML
12.5 INJECTION, SOLUTION INTRAMUSCULAR; INTRAVENOUS
Status: DISCONTINUED | OUTPATIENT
Start: 2018-01-31 | End: 2018-02-01 | Stop reason: HOSPADM

## 2018-01-31 RX ORDER — SODIUM CHLORIDE, SODIUM LACTATE, POTASSIUM CHLORIDE, CALCIUM CHLORIDE 600; 310; 30; 20 MG/100ML; MG/100ML; MG/100ML; MG/100ML
25 INJECTION, SOLUTION INTRAVENOUS CONTINUOUS
Status: DISCONTINUED | OUTPATIENT
Start: 2018-01-31 | End: 2018-01-31 | Stop reason: HOSPADM

## 2018-01-31 RX ORDER — MORPHINE SULFATE 10 MG/ML
INJECTION, SOLUTION INTRAMUSCULAR; INTRAVENOUS AS NEEDED
Status: DISCONTINUED | OUTPATIENT
Start: 2018-01-31 | End: 2018-01-31 | Stop reason: HOSPADM

## 2018-01-31 RX ORDER — INDOCYANINE GREEN AND WATER 25 MG
KIT INJECTION AS NEEDED
Status: DISCONTINUED | OUTPATIENT
Start: 2018-01-31 | End: 2018-01-31 | Stop reason: HOSPADM

## 2018-01-31 RX ORDER — SODIUM CHLORIDE 0.9 % (FLUSH) 0.9 %
5-10 SYRINGE (ML) INJECTION EVERY 8 HOURS
Status: DISCONTINUED | OUTPATIENT
Start: 2018-01-31 | End: 2018-01-31 | Stop reason: HOSPADM

## 2018-01-31 RX ORDER — PHENYLEPHRINE HCL IN 0.9% NACL 0.4MG/10ML
SYRINGE (ML) INTRAVENOUS AS NEEDED
Status: DISCONTINUED | OUTPATIENT
Start: 2018-01-31 | End: 2018-01-31 | Stop reason: HOSPADM

## 2018-01-31 RX ORDER — LORAZEPAM 2 MG/ML
1 INJECTION INTRAMUSCULAR
Status: DISCONTINUED | OUTPATIENT
Start: 2018-01-31 | End: 2018-02-01 | Stop reason: HOSPADM

## 2018-01-31 RX ORDER — HYDROMORPHONE HYDROCHLORIDE 1 MG/ML
1 INJECTION, SOLUTION INTRAMUSCULAR; INTRAVENOUS; SUBCUTANEOUS
Status: DISCONTINUED | OUTPATIENT
Start: 2018-01-31 | End: 2018-02-01 | Stop reason: HOSPADM

## 2018-01-31 RX ORDER — SODIUM CHLORIDE, SODIUM LACTATE, POTASSIUM CHLORIDE, CALCIUM CHLORIDE 600; 310; 30; 20 MG/100ML; MG/100ML; MG/100ML; MG/100ML
125 INJECTION, SOLUTION INTRAVENOUS CONTINUOUS
Status: DISCONTINUED | OUTPATIENT
Start: 2018-01-31 | End: 2018-01-31 | Stop reason: HOSPADM

## 2018-01-31 RX ORDER — SODIUM CHLORIDE 9 MG/ML
100 INJECTION, SOLUTION INTRAVENOUS CONTINUOUS
Status: DISCONTINUED | OUTPATIENT
Start: 2018-01-31 | End: 2018-02-01 | Stop reason: HOSPADM

## 2018-01-31 RX ORDER — TRAMADOL HYDROCHLORIDE 50 MG/1
50 TABLET ORAL
Status: DISCONTINUED | OUTPATIENT
Start: 2018-01-31 | End: 2018-02-01 | Stop reason: HOSPADM

## 2018-01-31 RX ORDER — AMLODIPINE BESYLATE 5 MG/1
10 TABLET ORAL DAILY
Status: DISCONTINUED | OUTPATIENT
Start: 2018-02-01 | End: 2018-02-01 | Stop reason: HOSPADM

## 2018-01-31 RX ORDER — OXYCODONE AND ACETAMINOPHEN 5; 325 MG/1; MG/1
1 TABLET ORAL
Status: DISCONTINUED | OUTPATIENT
Start: 2018-01-31 | End: 2018-02-01 | Stop reason: HOSPADM

## 2018-01-31 RX ORDER — FENTANYL CITRATE 50 UG/ML
25 INJECTION, SOLUTION INTRAMUSCULAR; INTRAVENOUS
Status: DISCONTINUED | OUTPATIENT
Start: 2018-01-31 | End: 2018-01-31 | Stop reason: HOSPADM

## 2018-01-31 RX ORDER — SODIUM CHLORIDE 0.9 % (FLUSH) 0.9 %
5-10 SYRINGE (ML) INJECTION AS NEEDED
Status: DISCONTINUED | OUTPATIENT
Start: 2018-01-31 | End: 2018-01-31 | Stop reason: HOSPADM

## 2018-01-31 RX ORDER — FENTANYL CITRATE 50 UG/ML
50 INJECTION, SOLUTION INTRAMUSCULAR; INTRAVENOUS AS NEEDED
Status: DISCONTINUED | OUTPATIENT
Start: 2018-01-31 | End: 2018-01-31 | Stop reason: HOSPADM

## 2018-01-31 RX ORDER — MIDAZOLAM HYDROCHLORIDE 1 MG/ML
0.5 INJECTION, SOLUTION INTRAMUSCULAR; INTRAVENOUS
Status: DISCONTINUED | OUTPATIENT
Start: 2018-01-31 | End: 2018-01-31 | Stop reason: HOSPADM

## 2018-01-31 RX ORDER — SODIUM CHLORIDE 9 MG/ML
25 INJECTION, SOLUTION INTRAVENOUS CONTINUOUS
Status: DISCONTINUED | OUTPATIENT
Start: 2018-01-31 | End: 2018-01-31 | Stop reason: HOSPADM

## 2018-01-31 RX ORDER — ENOXAPARIN SODIUM 100 MG/ML
40 INJECTION SUBCUTANEOUS EVERY 24 HOURS
Status: DISCONTINUED | OUTPATIENT
Start: 2018-01-31 | End: 2018-02-01 | Stop reason: HOSPADM

## 2018-01-31 RX ORDER — GABAPENTIN 300 MG/1
300 CAPSULE ORAL 2 TIMES DAILY
Status: DISCONTINUED | OUTPATIENT
Start: 2018-01-31 | End: 2018-02-01 | Stop reason: HOSPADM

## 2018-01-31 RX ORDER — CEFAZOLIN SODIUM/WATER 2 G/20 ML
2 SYRINGE (ML) INTRAVENOUS EVERY 8 HOURS
Status: COMPLETED | OUTPATIENT
Start: 2018-01-31 | End: 2018-02-01

## 2018-01-31 RX ORDER — HYDROMORPHONE HYDROCHLORIDE 1 MG/ML
0.2 INJECTION, SOLUTION INTRAMUSCULAR; INTRAVENOUS; SUBCUTANEOUS
Status: DISCONTINUED | OUTPATIENT
Start: 2018-01-31 | End: 2018-01-31 | Stop reason: HOSPADM

## 2018-01-31 RX ORDER — SODIUM CHLORIDE 0.9 % (FLUSH) 0.9 %
5-10 SYRINGE (ML) INJECTION AS NEEDED
Status: DISCONTINUED | OUTPATIENT
Start: 2018-01-31 | End: 2018-02-01 | Stop reason: HOSPADM

## 2018-01-31 RX ORDER — SODIUM CHLORIDE 0.9 % (FLUSH) 0.9 %
5-10 SYRINGE (ML) INJECTION EVERY 8 HOURS
Status: DISCONTINUED | OUTPATIENT
Start: 2018-01-31 | End: 2018-02-01 | Stop reason: HOSPADM

## 2018-01-31 RX ORDER — METOPROLOL TARTRATE 50 MG/1
50 TABLET ORAL 2 TIMES DAILY
Status: DISCONTINUED | OUTPATIENT
Start: 2018-01-31 | End: 2018-02-01 | Stop reason: HOSPADM

## 2018-01-31 RX ORDER — OXYCODONE HYDROCHLORIDE 5 MG/1
5 TABLET ORAL AS NEEDED
Status: DISCONTINUED | OUTPATIENT
Start: 2018-01-31 | End: 2018-01-31 | Stop reason: HOSPADM

## 2018-01-31 RX ORDER — MIDAZOLAM HYDROCHLORIDE 1 MG/ML
INJECTION, SOLUTION INTRAMUSCULAR; INTRAVENOUS AS NEEDED
Status: DISCONTINUED | OUTPATIENT
Start: 2018-01-31 | End: 2018-01-31 | Stop reason: HOSPADM

## 2018-01-31 RX ADMIN — ACETAMINOPHEN 1000 MG: 10 INJECTION, SOLUTION INTRAVENOUS at 10:53

## 2018-01-31 RX ADMIN — KETOROLAC TROMETHAMINE 15 MG: 30 INJECTION, SOLUTION INTRAMUSCULAR at 18:20

## 2018-01-31 RX ADMIN — FENTANYL CITRATE 25 MCG: 50 INJECTION, SOLUTION INTRAMUSCULAR; INTRAVENOUS at 13:37

## 2018-01-31 RX ADMIN — ROCURONIUM BROMIDE 30 MG: 10 INJECTION, SOLUTION INTRAVENOUS at 10:10

## 2018-01-31 RX ADMIN — ENOXAPARIN SODIUM 40 MG: 40 INJECTION SUBCUTANEOUS at 18:14

## 2018-01-31 RX ADMIN — EPHEDRINE SULFATE 5 MG: 50 INJECTION, SOLUTION INTRAVENOUS at 10:14

## 2018-01-31 RX ADMIN — ROCURONIUM BROMIDE 10 MG: 10 INJECTION, SOLUTION INTRAVENOUS at 11:45

## 2018-01-31 RX ADMIN — GABAPENTIN 300 MG: 300 CAPSULE ORAL at 20:25

## 2018-01-31 RX ADMIN — DEXAMETHASONE SODIUM PHOSPHATE 6 MG: 4 INJECTION, SOLUTION INTRA-ARTICULAR; INTRALESIONAL; INTRAMUSCULAR; INTRAVENOUS; SOFT TISSUE at 10:21

## 2018-01-31 RX ADMIN — Medication 10 ML: at 18:03

## 2018-01-31 RX ADMIN — MIDAZOLAM HYDROCHLORIDE 1 MG: 1 INJECTION, SOLUTION INTRAMUSCULAR; INTRAVENOUS at 09:47

## 2018-01-31 RX ADMIN — FENTANYL CITRATE 50 MCG: 50 INJECTION, SOLUTION INTRAMUSCULAR; INTRAVENOUS at 10:04

## 2018-01-31 RX ADMIN — ROCURONIUM BROMIDE 10 MG: 10 INJECTION, SOLUTION INTRAVENOUS at 11:17

## 2018-01-31 RX ADMIN — ONDANSETRON 4 MG: 2 INJECTION INTRAMUSCULAR; INTRAVENOUS at 12:13

## 2018-01-31 RX ADMIN — SODIUM CHLORIDE 100 ML/HR: 900 INJECTION, SOLUTION INTRAVENOUS at 13:18

## 2018-01-31 RX ADMIN — SODIUM CHLORIDE, SODIUM LACTATE, POTASSIUM CHLORIDE, AND CALCIUM CHLORIDE 25 ML/HR: 600; 310; 30; 20 INJECTION, SOLUTION INTRAVENOUS at 09:46

## 2018-01-31 RX ADMIN — LIDOCAINE HYDROCHLORIDE 60 MG: 20 INJECTION, SOLUTION EPIDURAL; INFILTRATION; INTRACAUDAL; PERINEURAL at 10:04

## 2018-01-31 RX ADMIN — FENTANYL CITRATE 25 MCG: 50 INJECTION, SOLUTION INTRAMUSCULAR; INTRAVENOUS at 13:11

## 2018-01-31 RX ADMIN — FENTANYL CITRATE 25 MCG: 50 INJECTION, SOLUTION INTRAMUSCULAR; INTRAVENOUS at 13:52

## 2018-01-31 RX ADMIN — METOPROLOL TARTRATE 50 MG: 50 TABLET ORAL at 20:26

## 2018-01-31 RX ADMIN — NEOSTIGMINE METHYLSULFATE 4 MG: 1 INJECTION INTRAVENOUS at 12:15

## 2018-01-31 RX ADMIN — ROCURONIUM BROMIDE 10 MG: 10 INJECTION, SOLUTION INTRAVENOUS at 10:04

## 2018-01-31 RX ADMIN — PROPOFOL 40 MG: 10 INJECTION, EMULSION INTRAVENOUS at 10:38

## 2018-01-31 RX ADMIN — SUCCINYLCHOLINE CHLORIDE 160 MG: 20 INJECTION INTRAMUSCULAR; INTRAVENOUS at 10:04

## 2018-01-31 RX ADMIN — Medication 2 G: at 18:04

## 2018-01-31 RX ADMIN — FENTANYL CITRATE 25 MCG: 50 INJECTION, SOLUTION INTRAMUSCULAR; INTRAVENOUS at 12:17

## 2018-01-31 RX ADMIN — ONDANSETRON 4 MG: 2 INJECTION INTRAMUSCULAR; INTRAVENOUS at 13:15

## 2018-01-31 RX ADMIN — MIDAZOLAM HYDROCHLORIDE 2 MG: 1 INJECTION, SOLUTION INTRAMUSCULAR; INTRAVENOUS at 09:46

## 2018-01-31 RX ADMIN — PROPOFOL 100 MG: 10 INJECTION, EMULSION INTRAVENOUS at 10:04

## 2018-01-31 RX ADMIN — Medication 80 MCG: at 10:11

## 2018-01-31 RX ADMIN — MORPHINE SULFATE 5 MG: 10 INJECTION, SOLUTION INTRAMUSCULAR; INTRAVENOUS at 12:17

## 2018-01-31 RX ADMIN — GLYCOPYRROLATE 0.7 MG: 0.2 INJECTION INTRAMUSCULAR; INTRAVENOUS at 12:15

## 2018-01-31 RX ADMIN — FENTANYL CITRATE 50 MCG: 50 INJECTION, SOLUTION INTRAMUSCULAR; INTRAVENOUS at 10:37

## 2018-01-31 RX ADMIN — OXYBUTYNIN CHLORIDE 15 MG: 5 TABLET ORAL at 20:27

## 2018-01-31 RX ADMIN — Medication 80 MCG: at 10:13

## 2018-01-31 NOTE — PROGRESS NOTES
TRANSFER - IN REPORT:    Verbal report received from [id number] [facility] RN(name) on Luz Mojgan Yvonne  being received from PACU(unit) for routine post - op      Report consisted of patients Situation, Background, Assessment and   Recommendations(SBAR). Information from the following report(s) SBAR, Intake/Output, MAR and Recent Results was reviewed with the receiving nurse. Opportunity for questions and clarification was provided. Assessment completed upon patients arrival to unit and care assumed.

## 2018-01-31 NOTE — OP NOTES
Gynecologic Oncology Operative Report    June Robinson 1/31/2018    Pre-operative dx:  Uterine carcinosarcoma     Post-operative dx:  Uterine carcinosarcoma     Procedure:  1) Robotic-assisted total laparoscopic hysterectomy     2) Bilateral salpingooophorectomy     3) Laparoscopic left sentinel pelvic lymph node dissection    4) Laparoscopic right pelvic node sampling. Surgeon:  Mike Jimenes MD     Assistant:  Enmanuel Brizuela PA-C     Anesthesia:  GETA     EBL:  25 cc     Complications:  None     Specimens:  uterus, cervix, bilateral fallopian tubes and ovaries, pelvic washings, bilateral sentinel pelvic lymph nodes     Operative indications:  60 yo BF found to have uterine carcinosarcoma on biopsy. She had no evidence of metastatic disease on CT and her CA-125 was normal.  I recommended robotic hysterectomy with omentectomy and lymph node evaluation. Operative findings:  Mildly enlarged, boggy uterus. Tumor protruding to the external cervical os. Small fibroid on the left posterior lower uterine segment extending into the broad ligament. Normal tubes and ovaries. At least 2 sentinel lymph nodes identified on the left. Right side did not map. Normal omentum. Normal upper abdomen. No evidence of metastatic disease. Procedure in detail: After the risks, benefits, indications, and alternatives of the procedure were discussed with the patient and informed consent was obtained, the patient was taken to the operating room. She was positively identified, administered general anesthesia, and then placed in the dorsal lithotomy position in [address] [place] [address]. An exam under anesthesia was performed. She was then prepped and draped in the usual fashion. A davenport catheter was inserted. An open-sided speculum was used to visualize the cervix. The cervix was grasped with a single-tooth tenaculum and then progressively dilated using cervical dilators.   Indocyanine green was then injected on either side of the cervix in preparation for sentinel lymph node mapping. I then placed a V-Care uterine manipulator. We then proceeded with laparoscopy. A horizontal incision was made in the midline above the level of the umbilicus. A Veres needle was inserted into the peritoneal cavity and the placement was confirmed. The abdomen was then insufflated to a pressure of 15 mm Hg. An 8 mm da Molly trocar was then inserted at this site. The camera was then inserted to confirm proper placement. Three additional 8 mm da Molly trocars were then placed under direct visualization, two on the right, and one on the left. These were placed approximately 8 cm apart in an arcing pattern. An 8 mm Airseal assistant port was then placed in the left abdomen, 8 cm from the lateral da Molly trocar. Positioning of the trocars in the abdominal wall were then adjusted to locate the point of articulation at the level of the fascia. The patient was then placed in steep Trendelenberg position. The camera port was then docked and the camera was inserted into the #2 port and advanced. The targeting mechanism was then used to allow for better localization of the other three arms. These trocars were then docked with the [employer]. A fenestrated bipolar grasper was placed in arm #1, monopolar scissors placed in arm #3, and a Prograsp placed in arm #4. I then removed my gown and made my way to the console. The abdomen and pelvis were then examined, with the above mentioned findings noted. Pelvic washings were obtained as well. We then proceeded with the hysterectomy. The left round ligament was identified, then coagulated and transected with the cautery, allowing entry into the retroperitoneal space. The vesico-uterine peritoneum was divided with cauteryl from the left side across the midline, allowing visualization of the ring of the V-Care manipulator anteriorly.   We then used the near-infrared filter on the [employer] camera to visualize the retroperitoneum and identify the sentinel lymph node(s). The dissection was then carried out using a combination of sharp and blunt dissection and cautery as needed for hemostasis. We then directed our attention to the right side of the pelvis. The right round ligament was identified and then coagulated and transected with cautery, allowing entry into the retroperitoneal space. The remaining vesico-uterine peritoneum was then divided with on the right side. We then used the near-infrared filter on the da Molly camera to visualize the retroperitoneum and identify the sentinel lymph node(s). No sentinel nodes were identified. The right side did not map. We proceeded with a sampling of nodes on the right, focusing on the obturator area and the bifurcation of the iliac vessels. There were no grossly enlarged nodes. The dissection was then carried out using a combination of sharp and blunt dissection and cautery as needed for hemostasis. We then moved back to the left side of the pelvis. We identified the left ureter and bluntly developed the perirectal space. The left uterine artery was identified at its origin off of the hypogastric artery, and it was coagulated with bipolar cautery at that point, with the ureter being held on traction medially to ensure its safety. The left ovarian vessels were then isolated and then coagulated and transected with cautery. The posterior leaf of the broad ligament was then divided with the cautery up to the level of the uterine body. We then moved back to the right side of the pelvis. The right ureter was then identified and the perirectal space was bluntly developed. The right uterine artery was then identified at its origin off of the hypogastric artery. It was coagulated with bipolar cautery at that point, with the ureter being held on traction medially to ensure its safety.  The right ovarian vessels were then isolated and then coagulated and transected with cautery. The posterior leaf of the broad ligament was then divided with the Harmonic Scalpel up to the level of the uterine body. We then moved anteriorly and the bladder was sharply dissected off of the lower uterine segment and cervix until it was well below the ring of the [employer]. The uterine arteries were then skeletonized bilaterally and then coagulated with bipolar cautery and transected with monopolar cautery at the level of the V-Care ring. A circumferential colpotomy was then performed by using the monopolar scissors to cut down onto the V-Care ring. Once the colpotomy was completed, the specimen was delivered transvaginally and sent to pathology. A bulb syringe was then placed into the vagina to maintain pneumoperitoneum. We then proceeded with omentectomy. The patient was taken out of steep Trendelenberg position. The omentum was then grasped and pulled toward the pelvis. The omentum was then divided along the inferior border of the transverse colon and an infracolic omentectomy was performed. The omentum was divided with cautery. Once it was completely freed it was placed in the pelvis and delivered transvaginally. The bulb syringe was then placed back into the vagina to maintain pneumoperitoneum for vaginal cuff closure. The patient was placed back into steep Trendelenberg. The scissors were then removed from arm #3 and replaced with a Megacut needle . The cuff was then reapproximated with a running closure using a 2-0 Stratafix suture. Excellent reapproximation and hemostasis was noted. The pelvis was then irrigated and all pedicles inspected. Once satisfied with hemostasis, the gas was then allowed to escape from the abdomen and the trochars were removed. She was then taken out of Trendelenburg tilt. The incision sites were closed with a stitch of 4-0 Monocryl, followed by a layer of Dermabond. The bulb syringe was then removed from the vagina.  The patient was taken out of stirrups, awakened from anesthesia, and taken to the recovery room in stable condition. All instrument, sponge, and needle counts were correct.      Narcisa Mcdonnell MD  1/31/2018  12:16 PM

## 2018-01-31 NOTE — IP AVS SNAPSHOT
[address] [facility] [address] [facility] 
[phone] Patient: Randal Cheng MRN: [id number] CSD:[id number] About your hospitalization You were admitted on:  January 31, 2018 You last received care in the:  [facility] [address] You were discharged on:  February 1, 2018 Why you were hospitalized Your primary diagnosis was:  Not on File Your diagnoses also included:  Uterine Carcinosarcoma (Hcc) Follow-up Information Follow up With Details Comments Contact Info Tarsha Caldwell, [address] [phone] Jennifer Chang MD Schedule an appointment as soon as possible for a visit in 4 weeks  [address] [place] [address] [address] 
[phone] Discharge Orders None A check haven indicates which time of day the medication should be taken. My Medications START taking these medications Instructions Each Dose to Equal  
 Morning Noon Evening Bedtime  
 oxyCODONE-acetaminophen 5-325 mg per tablet Commonly known as:  PERCOCET Your last dose was: Your next dose is: Take 1 Tab by mouth every four (4) hours as needed. Max Daily Amount: 6 Tabs. 1 Tab  
    
   
   
   
  
 polyethylene glycol 17 gram/dose powder Commonly known as:  Adebayo Agudelo Your last dose was: Your next dose is: Take 17 g by mouth daily. 17 g CONTINUE taking these medications Instructions Each Dose to Equal  
 Morning Noon Evening Bedtime  
 amLODIPine 10 mg tablet Commonly known as:  Della Ramirezozer Your last dose was: Your next dose is: Take 10 mg by mouth daily. 10 mg  
    
   
   
   
  
 aspirin 325 mg tablet Commonly known as:  ASPIRIN Your last dose was: Your next dose is: Take 325 mg by mouth daily.   
 325 mg  
    
   
 atorvastatin 10 mg tablet Commonly known as:  LIPITOR Your last dose was: Your next dose is: Take 10 mg by mouth daily. 10 mg CALCIUM 600 WITH VITAMIN D3 600 mg(1,500mg) -400 unit Chew Generic drug:  Calcium-Cholecalciferol (D3) Your last dose was: Your next dose is: Take 1 Tab by mouth daily. 1 Tab CENTRUM SILVER ULTRA WOMEN'S PO Your last dose was: Your next dose is: Take  by mouth.  
     
   
   
   
  
 gabapentin 300 mg capsule Commonly known as:  NEURONTIN Your last dose was: Your next dose is: Take 300 mg by mouth. 300 mg  
    
   
   
   
  
 meclizine 25 mg tablet Commonly known as:  ANTIVERT Your last dose was: Your next dose is: Take 25 mg by mouth as needed. 25 mg  
    
   
   
   
  
 metoprolol tartrate 50 mg tablet Commonly known as:  LOPRESSOR Your last dose was: Your next dose is: Take 50 mg by mouth two (2) times a day. 50 mg  
    
   
   
   
  
 oxybutynin 5 mg tablet Commonly known as:  EWBPPNKM Your last dose was: Your next dose is: Take 15 mg by mouth two (2) times a day. 15 mg PLAVIX 75 mg Tab Generic drug:  clopidogrel Your last dose was: Your next dose is: Take 75 mg by mouth daily. 75 mg  
    
   
   
   
  
 * traMADol 50 mg tablet Commonly known as:  ULTRAM  
   
Your last dose was: Your next dose is: Take 50 mg by mouth nightly. 50 mg  
    
   
   
   
  
 * TRAMADOL PO Your last dose was: Your next dose is: Take 100 mg by mouth every morning. 100 mg * Notice:   This list has 2 medication(s) that are the same as other medications prescribed for you. Read the directions carefully, and ask your doctor or other care provider to review them with you. Where to Get Your Medications Information on where to get these meds will be given to you by the nurse or doctor. ! Ask your nurse or doctor about these medications  
  oxyCODONE-acetaminophen 5-325 mg per tablet  
 polyethylene glycol 17 gram/dose powder Discharge Instructions [address], [address] Kaylynn Bagley[address] [place] [address] 
P [phone]  F [phone] YOUR DISCHARGE INSTRUCTIONS Anel Castano, Please review your instructions with your nurse and ask any questions so you have all the information you need to recover well at home. If you do not feel you have everything you need to succeed and be safe after you leave the hospital, please discuss these concerns with your nurse. As always, call for any questions at home. Your doctor: Therese Post MD 
Diagnosis: ENDOMETRIAL CANCER Uterine carcinosarcoma ([facility] [address] Procedure: Procedure(s): 
ROBOTIC ASSISTED TOTAL LAPAROSCOPIC HYSTERECTOMY, BILATERAL SALPINGO-OOPHERECTOMY, LEFT SENTINEL LYMPH NODE DISECTION, RIGHT PELVIC LYMPH NODE SAMPLING, OMENTECTOMY Date of Discharge: 2/1/2018 Take Home Medications See Discharge Medication Review provided to you by your nurse. If you did not receive one, request this prior to your discharge. · It is important that you take your medications as they are prescribed. · Keep your medications in the bottles provided by the pharmacist and keep a list of the medication names, dosages, times to be taken and what they are for in your wallet. · Do not take other medications without consulting your doctor. · If you are prescribed enoxaparin (Lovenox) and are taking a baby aspirin daily, please hold this medication until your course of enoxaparin is completed. · If you no longer need your prescribed pain medication, you may take Tylenol or Aleve alone. · You should take a daily gentle stool softener such as a colace pill or dulcolax suppository for constipation as this is not uncommon after surgery and/or while on pain medication. If not prescribed, this can be found over the counter. If constipation persists for >24 hours you should take a dose of Milk of Magnesia. Call if your constipation continues. Diet · Stay hydrated and drink fluids such as gatorade and water. This will also help prevent constipation and dehydration. Limit somewhat any usual caffeine intake of beverages such as soda, tea and coffee as this may serve to dehydrate you. · For the first 2-3 days keep a low fiber diet avoiding raw vegetables or fruits with skin. A diet consisting of soup, cereal, yogurt, eggs, fish, Boost/Ensure. Avoid fatty/greasy foods. · If nauseated, keep your diet limited to liquids and call if this persists. Activity · If possible, have someone with you at all times until you feel stable. · Gradually increase your activity each day. There are generally no restrictions on walking, climbing stairs or riding in a car. Try to walk at least 4 times per day. · Showers are okay. If you have an incision, no tub bathing/swimming for two weeks. · No driving for 1 week and/or while on pain medication. · There are no lifting restrictions if you did not have surgery. · If you had surgery, the following restrictions are in place: 1. If you had a laparoscopic procedure, no lifting greater than 25 lbs for 3 weeks. 2. If you had an open procedure, no heavy lifting greater than 15 lbs for 8 weeks. 3. If you had a hysterectomy, nothing per vagina for 6-8 weeks. 4. If you had any type of vaginal procedure, you may experience vaginal spotting. Should the bleeding require more that 4 pads a day please call our office. Incision · You should expect some discomfort in the area of your incision, particularly as you increase your activity. If you notice an area of increasing redness or new drainage, please call your doctor. · Staples are generally removed in 10-14 days. · Many incisions will have buried absorbable sutures, which do not need to be removed and are covered by protective glue. This will come off over time. Causes For Concern If any of the following occur, please call our office and speak with the Nurse/aid who will help you with your problem or ask the doctor to call you. Problems with the incision, including increasing pain, swelling, redness or drainage. Increasing abdominal pain despite medication Persisting nausea or vomiting. Fever or chills and a temperature >101F Constipation (no bowel movement for three days). Diarrhea (more than three watery stools within 24 hours). Excessive vaginal or wound bleeding. If after hours and you cannot reach an on-call physician, call 201. Follow-Up Call [phone] to schedule an appointment with Rika Cano MD in 4 weeks. Information obtained by : 
I understand that if any problems occur once I am at home I am to contact my physician. I understand and acknowledge receipt of the instructions indicated above. Physician's or R.N.'s Signature                                                                  Date/Time Patient or Representative Signature                                                          Date/Time Introducing [facility]!    
 Balaji Ward introduces [employer] patient portal. Now you can access parts of your medical record, email your doctor's office, and request medication refills online. 1. In your internet browser, go to [email or web address] 2. Click on the First Time User? Click Here link in the Sign In box. You will see the New Member Sign Up page. 3. Enter your [employer] Access Code exactly as it appears below. You will not need to use this code after youve completed the sign-up process. If you do not sign up before the expiration date, you must request a new code. · [employer] Access Code: [email or web address] Expires: 4/18/2018 11:19 AM 
 
4. Enter the last four digits of your Social Security Number (xxxx) and Date of Birth (mm/dd/yyyy) as indicated and click Submit. You will be taken to the next sign-up page. 5. Create a Giritech ID. This will be your [employer]ite[employer] login ID and cannot be changed, so think of one that is secure and easy to remember. 6. Create a Giritech password. You can change your password at any time. 7. Enter your Password Reset Question and Answer. This can be used at a later time if you forget your password. 8. Enter your e-mail address. You will receive e-mail notification when new information is available in [address] 9. Click Sign Up. You can now view and download portions of your medical record. 10. Click the Download Summary menu link to download a portable copy of your medical information. If you have questions, please visit the Frequently Asked Questions section of the [employer] website. Remember, [employer] is NOT to be used for urgent needs. For medical emergencies, dial 911. Now available from your iPhone and Android! Providers Seen During Your Hospitalization Provider Specialty Primary office phone Earl Huntley MD Gynecologic Oncology [phone] Your Primary Care Physician (PCP) Primary Care Physician Office Phone Office Fax Ismael Mccain [phone] You are allergic to the following Allergen Reactions Pcn (Penicillins) Unknown (comments) Pt's states she doesn't know reaction. Recent Documentation Height Weight BMI OB Status Smoking Status 1.74 m 115.2 kg 38.06 kg/m2 Postmenopausal Former Smoker Emergency Contacts Name Discharge Info Relation Home Work Mobile Arleen Bob DISCHARGE CAREGIVER [3] Other Relative [6] [phone] Patient Belongings The following personal items are in your possession at time of discharge: 
  Dental Appliances: None  Visual Aid: Glasses, With patient Please provide this summary of care documentation to your next provider. Signatures-by signing, you are acknowledging that this After Visit Summary has been reviewed with you and you have received a copy. Patient Signature:  ____________________________________________________________ Date:  ____________________________________________________________  
  
Shea Moritz Provider Signature:  ____________________________________________________________ Date:  ____________________________________________________________

## 2018-01-31 NOTE — ANESTHESIA POSTPROCEDURE EVALUATION
Post-Anesthesia Evaluation and Assessment    Patient: Lilly Browning MRN: [id number]  SSN: [id number]    YOB: 1955  Age: 61 y.o. Sex: female       Cardiovascular Function/Vital Signs  Visit Vitals    BP (!) 151/99 (BP 1 Location: Left arm, BP Patient Position: At rest;Supine)    Pulse (!) 101    Temp 36.9 °C (98.5 °F)    Resp 16    Ht 5' 8.5\" (1.74 m)    Wt 115.2 kg (254 lb)    SpO2 95%    BMI 38.06 kg/m2       Patient is status post general anesthesia for Procedure(s):  ROBOTIC ASSISTED TOTAL LAPAROSCOPIC HYSTERECTOMY, BILATERAL SALPINGO-OOPHERECTOMY, LEFT SENTINEL LYMPH NODE DISECTION, RIGHT PELVIC LYMPH NODE SAMPLING, OMENTECTOMY. Nausea/Vomiting: None    Postoperative hydration reviewed and adequate. Pain:  Pain Scale 1: Numeric (0 - 10) (01/31/18 1545)  Pain Intensity 1: 3 (01/31/18 1545)   Managed    Neurological Status:   Neuro (WDL): Exceptions to WDL (01/31/18 1443)  Neuro  Neurologic State: Alert (01/31/18 1545)  Orientation Level: Appropriate for age;Oriented X4 (01/31/18 1545)  Cognition: Appropriate decision making; Appropriate for age attention/concentration; Appropriate safety awareness; Follows commands (01/31/18 1545)  Speech: Clear (01/31/18 1545)  LUE Motor Response: Purposeful (01/31/18 1545)  LLE Motor Response: Purposeful (01/31/18 1545)  RUE Motor Response:  unequal L greater than R (01/31/18 1545)  RLE Motor Response: Purposeful (01/31/18 1545)   At baseline    Mental Status and Level of Consciousness: Arousable    Pulmonary Status:   O2 Device: Nasal cannula (01/31/18 1443)   Adequate oxygenation and airway patent    Complications related to anesthesia: None    Post-anesthesia assessment completed.  No concerns    Signed By: Siri Avendaño MD     January 31, 2018

## 2018-01-31 NOTE — ACP (ADVANCE CARE PLANNING)
Notified by Admitting personnel that Ms Shae Evans would like assistance in completing an AMD. Met patient in the Admitting waiting area and introduced self to her; she was accompanied by her sister, Leti Ayala. Ms Shae Evans acknowledged that she would like assistance in completing an AMD. Patient had the AMD with her and stated that she had read it and had no questions other than wanting to make sure she marked the correct areas indicating her choices. Ms Shae Evans stated that she wanted her sister, Leti Ayala, to be her MPOA and Ms Miguel Sellers agreed to serve in that capacity and to carry out patient's wishes. Assisted Ms Shae Evans in completing the AMD and this  and Admission's personnel witnessed patient's signature. Patient instructed to request that pre-op staff make a copy of AMD and place on patient's record. : Rev. Karie Tomas.  Deidra Caraballo; [facility], to contact [phone] [address] call: [phone]

## 2018-01-31 NOTE — H&P
[address] [place] [address] [facility] [address]  P [phone]  F [phone]      Patient ID:  Name:  Colt Washburn  MRN:  [id number]  :  1955/63 y.o. Date:  2018      HISTORY OF PRESENT ILLNESS:  Luz Hurst is a 61 y.o. 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 She is referred by Dr. Vianney Hernandez. She presented for evaluation of postmenopausal bleeding. A pelvic ultrasound revealed a thickened endometrium. Her pap smear was AGCUS. She then underwent colposcopy with cervical biopsy, ECC, and EMB. The cervical biopsy and ECC were negative. The EMB revealed carcinosarcoma of the endometrium. Based upon this pathology, I have been asked to see her in consultation for further evaluation and management. I sent her for a CT of the chest/abdomen/pelvis to evaluate for metastatic disease. The uterine disease was visible but there was no other evidence of metastatic disease. Her CA-125 was normal.          ROS:   and GI review:  Negative  Cardiopulmonary review:  Negative   Musculoskeletal:  Negative     A comprehensive review of systems was negative except for that written in the History of Present Illness.  , 10 point ROS        OB/GYN ROS:    There is no history of significant gyn problems or procedures.       Problem List:  Patient Active Problem List    Diagnosis Date Noted    Uterine carcinosarcoma ([address] 2018    Obesity (BMI 30.0-34.9) 2018    Hypertension 2017    Chronic lung disease 2017    Stroke ([address] 2017     PMH:  Past Medical History:   Diagnosis Date    Cancer ([address]     UTERINE CA    Chronic pain     Hypercholesterolemia     Hypertension     Stroke ([address] 2007    R SIDE AFFECTED      PSH:  Past Surgical History:   Procedure Laterality Date    COLONOSCOPY,DIAGNOSTIC  2015         HX BREAST BIOPSY Right 1993    right breast: benign    HX GI      COLONOSCOPY    HX ORTHOPAEDIC      GREAT L TOE BONE SPUR REMOVED      Social History:  Social History   Substance Use Topics    Smoking status: Former Smoker     Quit date: 10/4/1991    Smokeless tobacco: Never Used    Alcohol use No      Family History:  Family History   Problem Relation Age of Onset    Heart Disease Mother     Hypertension Mother     Diabetes Mother     Cancer Mother 62     breast cancer    Breast Cancer Mother      52's    Cancer Father 61     lung cancer    Stroke Brother     Stroke Maternal Grandmother     Stroke Maternal Grandfather     Kidney Disease Brother     Hypertension Brother       Medications: (reviewed)  No current facility-administered medications for this encounter. Allergies: (reviewed)  Allergies   Allergen Reactions    Pcn [Penicillins] Unknown (comments)     Pt's states she doesn't know reaction. OBJECTIVE:    Physical Exam:  VITAL SIGNS: There were no vitals filed for this visit. There is no height or weight on file to calculate BMI. GENERAL MELVIN: Conversant, alert, oriented. No acute distress. HEENT: HEENT. No thyroid enlargement. No JVD. Neck: Supple without restrictions. RESPIRATORY: Clear to auscultation and percussion to the bases. No CVAT. CARDIOVASC: RRR without murmur/rub. GASTROINT: soft, non-tender, without masses or organomegaly   MUSCULOSKEL: no joint tenderness, deformity or swelling   EXTREMITIES: extremities normal, atraumatic, no cyanosis or edema   PELVIC: Vulva and vagina appear normal. Bimanual exam reveals normal uterus and adnexa. RECTAL: Deferred   JOHN SURVEY: No suspicious lymphadenopathy or edema noted. NEURO: Grossly intact. No acute deficit.          Lab Results   Component Value Date/Time    WBC 7.3 01/24/2018 02:41 PM    HGB 13.5 01/24/2018 02:41 PM    HCT 41.9 01/24/2018 02:41 PM    PLATELET 052 58/02/6557 02:41 PM    MCV 92.7 01/24/2018 02:41 PM     Lab Results   Component Value Date/Time    Sodium 140 01/24/2018 02:41 PM    Potassium 4.2 01/24/2018 02:41 PM    Chloride 107 01/24/2018 02:41 PM    CO2 28 01/24/2018 02:41 PM    Anion gap 5 01/24/2018 02:41 PM    Glucose 64 01/24/2018 02:41 PM    BUN 18 01/24/2018 02:41 PM    Creatinine 1.24 01/24/2018 02:41 PM    BUN/Creatinine ratio 15 01/24/2018 02:41 PM    GFR est AA 53 01/24/2018 02:41 PM    GFR est non-AA 44 01/24/2018 02:41 PM    Calcium 8.6 01/24/2018 02:41 PM     CA-125 = 19      Imaging: Outside pelvic ultrasound from [address] [place] [address] for Women reviewed. Uterus measured 8.1 x 6.1 x 4.4 cm. Endometrial stripe measured 25 mm. Right ovary not seen. Left ovary appeared normal.  No fluid in the cul de sac. CT of chest/abdomen/pelvis (1/22/18)  THYROID: No nodule. MEDIASTINUM: No mass or lymphadenopathy. NAVIN: No mass or lymphadenopathy. THORACIC AORTA: No dissection or aneurysm. MAIN PULMONARY ARTERY: Normal in caliber. TRACHEA/BRONCHI: Patent. ESOPHAGUS: No wall thickening or dilatation. HEART: Normal in size. PLEURA: No effusion or pneumothorax. LUNGS: No nodule, mass, or airspace disease. Areas of atelectasis/scarring in  the lung bases and areas of calcification are stable. LIVER: No mass or biliary dilatation. GALLBLADDER: Unremarkable. SPLEEN: No mass. PANCREAS: No mass or ductal dilatation. ADRENALS: Left adrenal nodules are stable. KIDNEYS: Areas of scarring in the kidneys and right renal cyst are stable. STOMACH: Unremarkable. SMALL BOWEL: No dilatation or wall thickening. COLON: No dilatation or wall thickening. APPENDIX: Unremarkable. PERITONEUM: No ascites or pneumoperitoneum. RETROPERITONEUM: No lymphadenopathy or aortic aneurysm. REPRODUCTIVE ORGANS: There is a 3.4 cm enhancing mass within the uterus with low  density throughout endometrium consistent with a thickened endometrium. Ovaries  are normal in size.  Pelvic lymph nodes and inguinal lymph nodes are normal in  size is stable URINARY BLADDER: No mass or calculus. BONES: No destructive bone lesion. ADDITIONAL COMMENTS: N/A     IMPRESSION:  1. CT of the chest demonstrates no evidence of metastatic disease. Areas of  scarring and calcifications in the lower lobes are stable. 2. CT abdomen and pelvis demonstrates a 3.4 cm enhancing mass within the uterus  similar in size to 1/24/2017 however there is increasing low density/thickening  of the endometrium. Normal sized pelvic and inguinal lymph nodes are stable. 3. Left adrenal nodules are stable. Bilateral renal scarring and right renal  cyst is stable.        IMPRESSION/PLAN:  Luz Pantoja is a 61 y.o. female with a working diagnosis of uterine carcinosarcoma. I reviewed with Luz Romeo her medical records, physical exam, and review of symptoms. I discussed with her the pathology from the biopsy and explained the aggressive nature of carcinosarcomas and the possibility of metastatic disease. I explained the standard of care, which would include hysterectomy with staging. I recommended a robotic approach to hysterectomy. She was counseled on the risks, benefits, indications, and alternatives of surgery. Her questions were answered and she wishes to proceed.   A CT of the chest/abdomen/pelvis did not show any evidence of metastatic disease and her CA-125 was normal.        Signed By: Kenyatta Beard MD     1/31/2018/7:32 AM

## 2018-01-31 NOTE — PERIOP NOTES
Patient: Ambrose Cole MRN: [id number]  SSN: [id number]   YOB: 1955  Age: 61 y.o. Sex: female     Patient is status post Procedure(s):  ROBOTIC ASSISTED TOTAL LAPAROSCOPIC HYSTERECTOMY, BILATERAL SALPINGO-OOPHERECTOMY, LEFT SENTINEL LYMPH NODE DISECTION, RIGHT PELVIC LYMPH NODE SAMPLING.     Surgeon(s) and Role:     * Ochoa Mcmullen MD - Primary                      Peripheral IV 01/31/18 Left Hand (Active)       Peripheral IV 01/31/18 Left Antecubital (Active)                           Dressing/Packing:  Wound Abdomen Anterior-DRESSING TYPE: Topical skin adhesive/glue (01/31/18 1100)

## 2018-01-31 NOTE — PROGRESS NOTES
Spiritual Care Assessment/Progress Notes    Luz Romeo [id number]  [id number]    1955  61 y.o.  female    Patient Telephone Number: [phone] (home)   Anglican Affiliation: [facility]   Language: English   Extended Emergency Contact Information  Primary Emergency Contact: [address] Phone: [phone]  Relation: Other Relative   Patient Active Problem List    Diagnosis Date Noted    [facility] 01/18/2018    Obesity (BMI 30.0-34.9) 01/18/2018    Hypertension 05/08/2017    Chronic lung disease 05/08/2017    Stroke ([facility] 05/08/2017        Date: 1/31/2018       Level of Anglican/Spiritual Activity:  [x]         Involved in kevin tradition/spiritual practice    []         Not involved in kevin tradition/spiritual practice  [x]         Spiritually oriented    []         Claims no spiritual orientation    []         seeking spiritual identity  []         Feels alienated from Sabianist practice/tradition  []         Feels angry about Sabianist practice/tradition  [x]         Spirituality/Sabianist tradition IS a resource for coping at this time.   []         Not able to assess due to medical condition    Services Provided Today:  []         crisis intervention    []         reading Scriptures  [x]         spiritual assessment    []         prayer  [x]         empathic listening/emotional support  []         rites and rituals (cite in comments)  []         life review     []         Sabianist support  []         theological development   []         advocacy  []         ethical dialog     []         blessing  []         bereavement support    [x]         support to family  []         anticipatory grief support   [x]         help with AMD  []         spiritual guidance    []         meditation      Spiritual Care Needs  [x]         Emotional Support  []         Spiritual/Anglican Care  []         Loss/Adjustment  []         Advocacy/Referral /Ethics  []         No needs expressed at               this time  []         Other: (note in               comments)  [address]  []         Follow up visits with               pt/family  []         Provide materials  []         Schedule sacraments  []         Contact Community               Clergy  [x]         Follow up as needed  []         Other: (note in               comments)     Comments: Notified by Admitting personnel that Ms Cruz Shall would like assistance in completing an AMD. Met patient in the Admitting waiting area and introduced self to her; she was accompanied by her sister, Jaqueline Maradiaga. Ms Cruz Shall acknowledged that she would like assistance in completing an AMD. Patient had the AMD with her and stated that she had read it and no questions other than wanting to make sure she marked the correct areas indicating her choices. Ms Cruz Shall stated that she wanted her sister, Jaqueline Maradiaga, to be her MPOA and Ms Ailyn Sen agreed to serve in that capacity and to carry out patient's wishes. Assisted Ms Nancy Gallegos in completing the AMD and this  and Admission's personnel witnessed patient's signature. Patient instructed to request that pre-op staff make a copy of AMD and place on patient's record. Ms Cruz Shall stated that she was a member of [employer] and that her kevin was very important to her; she especially appreciated prayers on her behalf. Assured patient and her sister of prayers and of ongoing  availability for support. : Rev. Kris Dey.  Nicole Olmedo; [facility], to contact [phone] call: [phone]

## 2018-01-31 NOTE — PERIOP NOTES
TRANSFER - OUT REPORT:    Verbal report given to Kinga Worley (name) on June Colin Nuñez  being transferred to 352(unit) for routine post - op       Report consisted of patients Situation, Background, Assessment and   Recommendations(SBAR). Time Pre op antibiotic given:1012  Anesthesia Stop time: 5917  Cao Present on Transfer to floor:y  Order for Cao on Chart:y  Discharge Prescriptions with Chart:n    Information from the following report(s) SBAR, OR Summary, Intake/Output, MAR and Accordion was reviewed with the receiving nurse. Opportunity for questions and clarification was provided. Is the patient on 02? YES       L/Min 2       Other     Is the patient on a monitor? NO    Is the nurse transporting with the patient? NO    Surgical Waiting Area notified of patient's transfer from PACU?  YES      The following personal items collected during your admission accompanied patient upon transfer:   Dental Appliance: Dental Appliances: None  Vision: Visual Aid: Glasses (sent to pacu)  Hearing Aid:    Jewelry:    Clothing:    Other Valuables:    Valuables sent to safe:        Clothes and glasses to floor with pt

## 2018-01-31 NOTE — ANESTHESIA PREPROCEDURE EVALUATION
Anesthetic History   No history of anesthetic complications            Review of Systems / Medical History  Patient summary reviewed, nursing notes reviewed and pertinent labs reviewed    Pulmonary          Smoker (Former)         Neuro/Psych       CVA (Rt sided  residual)  TIA     Cardiovascular    Hypertension: well controlled              Exercise tolerance: <4 METS  Comments: Hyperlipidemia  No EKG or CXR on file  Uses a mobile cart at stores for shopping   GI/Hepatic/Renal         Renal disease: CRI       Endo/Other        Obesity     Other Findings            Physical Exam    Airway  Mallampati: II  TM Distance: > 6 cm  Neck ROM: normal range of motion   Mouth opening: Normal     Cardiovascular    Rhythm: regular  Rate: normal         Dental         Pulmonary  Breath sounds clear to auscultation               Abdominal  GI exam deferred       Other Findings            Anesthetic Plan    ASA: 3  Anesthesia type: general          Induction: Intravenous  Anesthetic plan and risks discussed with: Patient

## 2018-01-31 NOTE — BRIEF OP NOTE
BRIEF OPERATIVE NOTE    Date of Procedure: 1/31/2018   Preoperative Diagnosis: ENDOMETRIAL CANCER  Postoperative Diagnosis: ENDOMETRIAL CANCER    Procedure(s): ROBOTIC ASSISTED TOTAL LAPAROSCOPIC HYSTERECTOMY, BILATERAL SALPINGOOOPHORECTOMY, PELVIC LYMPHADENECTOMY, OMENTECTOMY   Surgeon(s) and Role:     * Tashia Cardoza MD - Primary       Assistant Staff: Physician Assistant: GOSIA Reyes  Surgical Staff:  Circ-1: Giovanni Shultz RN  Physician Assistant: GOSIA Reyes  Scrub Tech-1: Frances Finch  Scrub RN-Relief: Lavelle Ervin RN  Scrub RN - Intern: Indiana Zhang RN  Event Time In   Incision Start 1035   Incision Close      Anesthesia: General   Estimated Blood Loss: 25 cc  Specimens:   ID Type Source Tests Collected by Time Destination   1 : left pelvic sentinel lymph node Fresh Lymph Node  Tashia Cardoza MD 1/31/2018 1108 Pathology   2 : right pelvic lymph node sampling Fresh Lymph Node  Tashia Cardoza MD 1/31/2018 1119 Pathology   3 : fragment of right cervix Fresh Tissue  Tashia Cardoza MD 1/31/2018 1141 Pathology   4 : uterus, bilateral fallopian tubes, ovaries Fresh Uterus with Bilateral Fallopian tubes and Jyl Ting., MD 1/31/2018 1144 Pathology   5 : omentum Fresh Tissue  Tashia Cardoza MD 1/31/2018 1201 Pathology   1 : pelvic washings Fresh Other                  Tashia Cardoza MD 1/31/2018 1146 Cytology      Findings: Mildly enlarged, boggy uterus. Tumor protruding to the external cervical os. Small fibroid on the left posterior lower uterine segment extending into the broad ligament. Normal tubes and ovaries. At least 2 sentinel lymph nodes identified on the left. Right side did not map. Normal omentum. Normal upper abdomen. No evidence of metastatic disease.      Complications: None    Tashia Cardoza MD

## 2018-01-31 NOTE — PERIOP NOTES
Patient interviewed in holding area, identity and procedure verified. 1000 ml 0.9% NaCl as irrigation. Sister updated as to start of surgery.

## 2018-01-31 NOTE — PROGRESS NOTES
Bedside and Verbal shift change report given to Amrit Gottlieb (oncoming nurse) by Maria Elena Hamilton (offgoing nurse). Report included the following information SBAR, Intake/Output, MAR and Recent Results.

## 2018-01-31 NOTE — PERIOP NOTES
Discussed with patient ROM of right arm post stroke. Patient stated it could be at her side for positioning. We discussed that In would position arm at it's natural ROM when patient asleep.

## 2018-02-01 VITALS
HEIGHT: 69 IN | DIASTOLIC BLOOD PRESSURE: 64 MMHG | SYSTOLIC BLOOD PRESSURE: 111 MMHG | RESPIRATION RATE: 18 BRPM | OXYGEN SATURATION: 92 % | TEMPERATURE: 99.7 F | WEIGHT: 254 LBS | HEART RATE: 90 BPM | BODY MASS INDEX: 37.62 KG/M2

## 2018-02-01 LAB
ANION GAP SERPL CALC-SCNC: 10 MMOL/L (ref 5–15)
BUN SERPL-MCNC: 21 MG/DL (ref 6–20)
BUN/CREAT SERPL: 16 (ref 12–20)
CALCIUM SERPL-MCNC: 8.2 MG/DL (ref 8.5–10.1)
CHLORIDE SERPL-SCNC: 107 MMOL/L (ref 97–108)
CO2 SERPL-SCNC: 23 MMOL/L (ref 21–32)
CREAT SERPL-MCNC: 1.28 MG/DL (ref 0.55–1.02)
ERYTHROCYTE [DISTWIDTH] IN BLOOD BY AUTOMATED COUNT: 13.8 % (ref 11.5–14.5)
GLUCOSE SERPL-MCNC: 111 MG/DL (ref 65–100)
HCT VFR BLD AUTO: 39.2 % (ref 35–47)
HGB BLD-MCNC: 13 G/DL (ref 11.5–16)
MCH RBC QN AUTO: 30.2 PG (ref 26–34)
MCHC RBC AUTO-ENTMCNC: 33.2 G/DL (ref 30–36.5)
MCV RBC AUTO: 91.2 FL (ref 80–99)
NRBC # BLD: 0 K/UL (ref 0–0.01)
NRBC BLD-RTO: 0 PER 100 WBC
PLATELET # BLD AUTO: 305 K/UL (ref 150–400)
PMV BLD AUTO: 9.7 FL (ref 8.9–12.9)
POTASSIUM SERPL-SCNC: 4 MMOL/L (ref 3.5–5.1)
RBC # BLD AUTO: 4.3 M/UL (ref 3.8–5.2)
SODIUM SERPL-SCNC: 140 MMOL/L (ref 136–145)
WBC # BLD AUTO: 13.1 K/UL (ref 3.6–11)

## 2018-02-01 PROCEDURE — 85027 COMPLETE CBC AUTOMATED: CPT | Performed by: OBSTETRICS & GYNECOLOGY

## 2018-02-01 PROCEDURE — 99218 HC RM OBSERVATION: CPT

## 2018-02-01 PROCEDURE — 74011250637 HC RX REV CODE- 250/637: Performed by: OBSTETRICS & GYNECOLOGY

## 2018-02-01 PROCEDURE — 74011250636 HC RX REV CODE- 250/636: Performed by: OBSTETRICS & GYNECOLOGY

## 2018-02-01 PROCEDURE — 36415 COLL VENOUS BLD VENIPUNCTURE: CPT | Performed by: OBSTETRICS & GYNECOLOGY

## 2018-02-01 PROCEDURE — 80048 BASIC METABOLIC PNL TOTAL CA: CPT | Performed by: OBSTETRICS & GYNECOLOGY

## 2018-02-01 RX ORDER — OXYCODONE AND ACETAMINOPHEN 5; 325 MG/1; MG/1
1 TABLET ORAL
Qty: 20 TAB | Refills: 0 | Status: SHIPPED | OUTPATIENT
Start: 2018-02-01 | End: 2018-03-01 | Stop reason: ALTCHOICE

## 2018-02-01 RX ORDER — POLYETHYLENE GLYCOL 3350 17 G/17G
17 POWDER, FOR SOLUTION ORAL DAILY
Qty: 238 G | Refills: 1 | Status: SHIPPED | OUTPATIENT
Start: 2018-02-01 | End: 2018-05-21

## 2018-02-01 RX ADMIN — Medication 2 G: at 02:10

## 2018-02-01 RX ADMIN — TRAMADOL HYDROCHLORIDE 50 MG: 50 TABLET, FILM COATED ORAL at 00:07

## 2018-02-01 RX ADMIN — Medication 10 ML: at 06:01

## 2018-02-01 RX ADMIN — AMLODIPINE BESYLATE 10 MG: 5 TABLET ORAL at 09:17

## 2018-02-01 RX ADMIN — OXYBUTYNIN CHLORIDE 15 MG: 5 TABLET ORAL at 09:17

## 2018-02-01 RX ADMIN — TRAMADOL HYDROCHLORIDE 100 MG: 50 TABLET, FILM COATED ORAL at 09:14

## 2018-02-01 RX ADMIN — GABAPENTIN 300 MG: 300 CAPSULE ORAL at 09:23

## 2018-02-01 RX ADMIN — METOPROLOL TARTRATE 50 MG: 50 TABLET ORAL at 09:23

## 2018-02-01 RX ADMIN — CLOPIDOGREL BISULFATE 75 MG: 75 TABLET ORAL at 09:14

## 2018-02-01 NOTE — PROGRESS NOTES
Bedside shift change report given to Eva Esposito (oncoming nurse) by Cortez Magana RN (offgoing nurse). Report included the following information SBAR, Kardex, Procedure Summary, Intake/Output, MAR, Accordion, Med Rec Status and Cardiac Rhythm Tachy.

## 2018-02-01 NOTE — PROGRESS NOTES
Bedside and Verbal shift change report given to Fercho Merrill RN and Shalini Han RN (oncoming nurse) by Kushal Stephen RN (offgoing nurse). Report included the following information SBAR, Kardex, Procedure Summary, Intake/Output, MAR, Accordion and Recent Results.

## 2018-02-01 NOTE — DISCHARGE INSTRUCTIONS
[facility] [address]  P [phone]  F [phone] [address],      Please review your instructions with your nurse and ask any questions so you have all the information you need to recover well at home. If you do not feel you have everything you need to succeed and be safe after you leave the hospital, please discuss these concerns with your nurse. As always, call for any questions at home. Your doctor: Johnny Yusuf MD  Diagnosis: ENDOMETRIAL CANCER  Uterine carcinosarcoma ([facility]  Procedure: Procedure(s):  ROBOTIC ASSISTED TOTAL LAPAROSCOPIC HYSTERECTOMY, BILATERAL SALPINGO-OOPHERECTOMY, LEFT SENTINEL LYMPH NODE DISECTION, RIGHT PELVIC LYMPH NODE SAMPLING, OMENTECTOMY  Date of Discharge: 2/1/2018      Take Home Medications     See Discharge Medication Review provided to you by your nurse. If you did not receive one, request this prior to your discharge. · It is important that you take your medications as they are prescribed. · Keep your medications in the bottles provided by the pharmacist and keep a list of the medication names, dosages, times to be taken and what they are for in your wallet. · Do not take other medications without consulting your doctor. · If you are prescribed enoxaparin (Lovenox) and are taking a baby aspirin daily, please hold this medication until your course of enoxaparin is completed. · If you no longer need your prescribed pain medication, you may take Tylenol or Aleve alone. · You should take a daily gentle stool softener such as a colace pill or dulcolax suppository for constipation as this is not uncommon after surgery and/or while on pain medication. If not prescribed, this can be found over the counter. If constipation persists for >24 hours you should take a dose of Milk of Magnesia. Call if your constipation continues.       Diet    · Stay hydrated and drink fluids such as gatorade and water. This will also help prevent constipation and dehydration. Limit somewhat any usual caffeine intake of beverages such as soda, tea and coffee as this may serve to dehydrate you. · For the first 2-3 days keep a low fiber diet avoiding raw vegetables or fruits with skin. A diet consisting of soup, cereal, yogurt, eggs, fish, Boost/Ensure. Avoid fatty/greasy foods. · If nauseated, keep your diet limited to liquids and call if this persists. Activity    · If possible, have someone with you at all times until you feel stable. · Gradually increase your activity each day. There are generally no restrictions on walking, climbing stairs or riding in a car. Try to walk at least 4 times per day. · Showers are okay. If you have an incision, no tub bathing/swimming for two weeks. · No driving for 1 week and/or while on pain medication. · There are no lifting restrictions if you did not have surgery. · If you had surgery, the following restrictions are in place:  1. If you had a laparoscopic procedure, no lifting greater than 25 lbs for 3 weeks. 2. If you had an open procedure, no heavy lifting greater than 15 lbs for 8 weeks. 3. If you had a hysterectomy, nothing per vagina for 6-8 weeks. 4. If you had any type of vaginal procedure, you may experience vaginal spotting. Should the bleeding require more that 4 pads a day please call our office. Incision    · You should expect some discomfort in the area of your incision, particularly as you increase your activity. If you notice an area of increasing redness or new drainage, please call your doctor. · Staples are generally removed in 10-14 days. · Many incisions will have buried absorbable sutures, which do not need to be removed and are covered by protective glue. This will come off over time.       Causes For Concern    If any of the following occur, please call our office and speak with the Nurse/aid who will help you with your problem or ask the doctor to call you. Problems with the incision, including increasing pain, swelling, redness or drainage. Increasing abdominal pain despite medication  Persisting nausea or vomiting. Fever or chills and a temperature >101F  Constipation (no bowel movement for three days). Diarrhea (more than three watery stools within 24 hours). Excessive vaginal or wound bleeding. If after hours and you cannot reach an on-call physician, call 911. Follow-Up    Call [phone] to schedule an appointment with Damian Lyles MD in 4 weeks. Information obtained by :  I understand that if any problems occur once I am at home I am to contact my physician. I understand and acknowledge receipt of the instructions indicated above.                                                                                                                                            Physician's or R.N.'s Signature                                                                  Date/Time                                                                                                                                              Patient or Representative Signature                                                          Date/Time

## 2018-02-01 NOTE — PROGRESS NOTES
Gynecologic Oncology - [address] [place] [address]  Progress Note       Patient: Luz Chan Date: 1/31/2018  Admit Dx: ENDOMETRIAL CANCER  Uterine carcinosarcoma ([address]    Subjective:     Doing well, ate, ambulated. Yet to void. No cp/sob, N/V, f/c    Objective: Intake/Output Summary (Last 24 hours) at 02/01/18 0733  Last data filed at 02/01/18 0601   Gross per 24 hour   Intake             1000 ml   Output             1980 ml   Net             -980 ml       Physical Exam  /64 (BP 1 Location: Left arm, BP Patient Position: At rest)  Pulse 90  Temp 99.7 °F (37.6 °C)  Resp 18  Ht 5' 8.5\" (1.74 m)  Wt 254 lb (115.2 kg)  LMP 01/09/2011  SpO2 92%  BMI 38.06 kg/m2     General:  alert, cooperative, no distress     Cardiac:  Regular rate and rhythm        Lungs:  nonlabored  Abdomen:  soft, non-tender, without masses or organomegaly       Wound:  clean, dry, no drainage   Extremity: no edema. Right arm contractures, stable      Data Review  Lab Results   Component Value Date/Time    WBC 13.1 02/01/2018 06:08 AM    ABS. NEUTROPHILS 4.1 01/24/2018 02:41 PM    HGB 13.0 02/01/2018 06:08 AM    HCT 39.2 02/01/2018 06:08 AM    MCV 91.2 02/01/2018 06:08 AM    MCH 30.2 02/01/2018 06:08 AM    PLATELET 826 07/30/4096 06:08 AM     Lab Results   Component Value Date/Time    Sodium 140 02/01/2018 06:08 AM    Potassium 4.0 02/01/2018 06:08 AM    Chloride 107 02/01/2018 06:08 AM    CO2 23 02/01/2018 06:08 AM    Glucose 111 02/01/2018 06:08 AM    BUN 21 02/01/2018 06:08 AM    Creatinine 1.28 02/01/2018 06:08 AM    Calcium 8.2 02/01/2018 06:08 AM    Albumin 3.6 01/24/2018 02:41 PM    Bilirubin, total 0.9 01/24/2018 02:41 PM    AST (SGOT) 14 01/24/2018 02:41 PM    ALT (SGPT) 16 01/24/2018 02:41 PM    Alk.  phosphatase 111 01/24/2018 02:41 PM         Assessment/Plan:     Admitted for ENDOMETRIAL CANCER  Uterine carcinosarcoma ([facility]    June Robinson 1 Day Post-Op Procedure(s):  ROBOTIC ASSISTED TOTAL LAPAROSCOPIC HYSTERECTOMY, BILATERAL SALPINGO-OOPHERECTOMY, LEFT SENTINEL LYMPH NODE DISECTION, RIGHT PELVIC LYMPH NODE SAMPLING, OMENTECTOMY for ENDOMETRIAL CANCER    Onc: carcinosarcoma, await final staging path  Heme/CV: Hemodynamically stable  Renal: normal, mildly elevated Cr stable  FEN/GI: reg diet  ID/Wound: afeb, abd benign  Neuro: pain absent  PPX: resume home plavix/asa for hx of stroke  Disposition: Doing well postoperatively. Home  Instructions and f/u reviewed. Stronly encouraged arelative/cousin to stay the night with her. She is quite independent and wishes to remain so, though multiple stairs in house.       GOSIA Monge  Gyn Onc

## 2018-02-02 ENCOUNTER — TELEPHONE (OUTPATIENT)
Dept: GYNECOLOGY | Age: 63
End: 2018-02-02

## 2018-02-02 DIAGNOSIS — T36.95XA ANTIBIOTICS CAUSING ADVERSE EFFECT IN THERAPEUTIC USE, INITIAL ENCOUNTER: ICD-10-CM

## 2018-02-02 DIAGNOSIS — R22.0 LIP SWELLING: Primary | ICD-10-CM

## 2018-02-02 RX ORDER — PREDNISONE 10 MG/1
10 TABLET ORAL SEE ADMIN INSTRUCTIONS
Qty: 21 TAB | Refills: 0 | Status: SHIPPED | OUTPATIENT
Start: 2018-02-02 | End: 2018-03-01 | Stop reason: ALTCHOICE

## 2018-02-02 NOTE — TELEPHONE ENCOUNTER
Pt  to make her 4 week post op appt, she also stated that her upper lip had started to swell when she was leaving the hospital, she states that is has gotten worse and goes across her whole upper lip, no swelling in the mouth or tongue, no problems breathing, I s/w Josias and he sent over a steroid dose pack to her [address], pt was called and notified, she states she has no transportation but will ask someone to go and pick it up for her. Pt was also advised to call office or seek medical attention if she has trouble breathing, the swelling does into her mouth or tongue.   Pt verbalized understanding

## 2018-02-02 NOTE — PROGRESS NOTES
Pt voided only 100cc of clear yellow urine pt denies any pressure or feeling has to void abd soft non distended. Called talked with Brenda ELISE about pt's void. Order to scan bladder and call back with results. Scan pt bladder on had 38cc of urine showing called back with results.  Still ok to d/c pt to home. shahid de la cruz rn

## 2018-02-07 ENCOUNTER — TELEPHONE (OUTPATIENT)
Dept: GYNECOLOGY | Age: 63
End: 2018-02-07

## 2018-02-07 NOTE — TELEPHONE ENCOUNTER
Pt , she wanted to let office know that her mobile phone  \"has \" and she was using a rental office phone to call us, she will get another  but it will be a couple of days that she will not be able to be reached, she also states she has been using the miralax and she has a bowel movement and she has had several more that have been runny. She will stop the miralax and see what her body will do on it's own, if she needs to she will start the miralax again, she also wanted us to know that the steroid has helped her swollen lip, it has gone down.   She will call back to the office if needed

## 2018-03-01 ENCOUNTER — OFFICE VISIT (OUTPATIENT)
Dept: GYNECOLOGY | Age: 63
End: 2018-03-01

## 2018-03-01 VITALS
HEIGHT: 69 IN | DIASTOLIC BLOOD PRESSURE: 74 MMHG | BODY MASS INDEX: 37.09 KG/M2 | WEIGHT: 250.4 LBS | SYSTOLIC BLOOD PRESSURE: 120 MMHG | HEART RATE: 72 BPM

## 2018-03-01 DIAGNOSIS — C55 UTERINE CARCINOSARCOMA (HCC): Primary | ICD-10-CM

## 2018-03-01 RX ORDER — TERCONAZOLE 8 MG/G
CREAM VAGINAL
Qty: 20 G | Refills: 0 | Status: SHIPPED | OUTPATIENT
Start: 2018-03-01 | End: 2018-03-01 | Stop reason: SDUPTHER

## 2018-03-01 RX ORDER — TERCONAZOLE 8 MG/G
CREAM VAGINAL
Qty: 20 G | Refills: 0 | Status: SHIPPED | OUTPATIENT
Start: 2018-03-01 | End: 2018-03-14 | Stop reason: SDUPTHER

## 2018-03-01 NOTE — PROGRESS NOTES
[address] [place] [address] [address], [address] [facility]  P [phone]  F [phone]    Postoperative Office Note  Patient ID:  Name: Vincent Connor  MRM: [id number]  : 1955/63 y.o. Date: 3/1/2018    Diagnosis:     ICD-10-CM ICD-9-CM    1. 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 9) E66.9    Elevated serum creatinine R79.89       SUBJECTIVE:    Luz Collazo is a 61 y.o. female who presents for followup postoperative care following robotic assisted laparoscopic hysterectomy, BSO, sentinel lymph node dissection, omentectomy. The patient's pathology revealed: FINAL PATHOLOGIC DIAGNOSIS   1. [place] lymph node, left pelvic, excision:   Two lymph nodes, negative for metastatic carcinoma (0/2)   Three levels and a cytokeratin stain are negative   2. Lymph node, right pelvic, excision:   Two lymph nodes, negative for metastatic carcinoma (0/2)   3. Fragment of right cervix, excision:   Benign ectocervix   4. Uterus, cervix, bilateral ovaries and fallopian tubes, hysterectomy and salpingo-oophorectomy:   Malignant mixed Müllerian tumor (carcinosarcoma)   Endometrial polyp, 2.0 cm   Adenomyosis   Leiomyomas (×2), up to 4.0 cm in greatest dimension   ENDOMETRIUM   SYNOPTIC REPORT   SPECIMEN   Procedure:  Total abdominal hysterectomy and bilateral salpingo-oophorectomy   TUMOR   Histologic Type: Carcinosarcoma (malignant mixed Müllerian tumor)   Tumor Size: 8.5 Centimeters (cm)   Tumor Extent   Myometrial Invasion: Present   Depth of Invasion in Millimeters: 3 Millimeters (mm)   Myometrial Thickness in Millimeters: 25 Millimeters (mm)   Percentage of Myometrial Invasion: 12 %   Adenomyosis: Present, uninvolved by carcinoma   Uterine Serosa Involvement: Not identified   Cervical Stromal Involvement: Not identified   Other Tissue / Organ Involvement: Not identified   Accessory Findings   Lymphovascular Invasion: Present   LYMPH NODES   Number of Pelvic Nodes with Macrometastasis: 0   Number of Pelvic Nodes with Micrometastasis: 0   Number of Pelvic Nodes with Isolated Tumor Cells: 0   Total Number of Pelvic Node(s) Examined (sentinel and nonsentinel): 4   Number of Pelvic [place] Nodes Examined: 2   Laterality of Pelvic Node(s) Examined: Right, Left   Para-aortic Lymph Nodes: No para-aortic nodes submitted or found   PATHOLOGIC STAGE CLASSIFICATION (pTNM, AJCC 8th Edition)   Primary Tumor (pT): pT1a   Regional Lymph Nodes (pN)   Category (pN): pN0   5. Omentum, omentectomy:   Benign fibroadipose tissue   No malignancy identified       Currently she has no problems with eating, bowel movements, or voiding. Appetite is good. Eating a regular diet without difficulty. Bowel movements are regular. The patient is not having any pain. Medications:     Current Outpatient Prescriptions on File Prior to Visit   Medication Sig Dispense Refill    polyethylene glycol (MIRALAX) 17 gram/dose powder Take 17 g by mouth daily. 238 g 1    amLODIPine (NORVASC) 10 mg tablet Take 10 mg by mouth daily.  meclizine (ANTIVERT) 25 mg tablet Take 25 mg by mouth as needed.  traMADol (ULTRAM) 50 mg tablet Take 50 mg by mouth nightly.  TRAMADOL HCL (TRAMADOL PO) Take 100 mg by mouth every morning.  aspirin (ASPIRIN) 325 mg tablet Take 325 mg by mouth daily.  atorvastatin (LIPITOR) 10 mg tablet Take 10 mg by mouth daily.  MULTIVITS W-FE,OTHER MIN/LUT (CENTRUM SILVER ULTRA WOMEN'S PO) Take  by mouth.  clopidogrel (PLAVIX) 75 mg tablet Take 75 mg by mouth daily.  gabapentin (NEURONTIN) 300 mg capsule Take 300 mg by mouth.  metoprolol (LOPRESSOR) 50 mg tablet Take 50 mg by mouth two (2) times a day.  oxybutynin (DITROPAN) 5 mg tablet Take 15 mg by mouth two (2) times a day.  Calcium-Cholecalciferol, D3, (CALCIUM 600 WITH VITAMIN D3) 600 mg(1,500mg) -400 unit chew Take 1 Tab by mouth daily. No current facility-administered medications on file prior to visit. Allergies: Allergies   Allergen Reactions    Pcn [Penicillins] Unknown (comments)     Pt's states she doesn't know reaction. Past Medical History:   Diagnosis Date    Cancer [facility])     UTERINE CA    Chronic pain     Hypercholesterolemia     Hypertension     Stroke ([facility] [address] 9/2007    R SIDE AFFECTED     Past Surgical History:   Procedure Laterality Date    COLONOSCOPY,DIAGNOSTIC  4/23/2015         HX BREAST BIOPSY Right 1993    right breast:  benign    HX GI      COLONOSCOPY    HX ORTHOPAEDIC      GREAT L TOE BONE SPUR REMOVED     Social History     Social History    Marital status: SINGLE     Spouse name: N/A    Number of children: N/A    Years of education: N/A     Occupational History    Not on file. Social History Main Topics    Smoking status: Former Smoker     Quit date: 10/4/1991    Smokeless tobacco: Never Used    Alcohol use No    Drug use: No    Sexual activity: Not Currently     Other Topics Concern    Not on file     Social History Narrative       OBJECTIVE:    Vitals:   Vitals:    03/01/18 1114   BP: 120/74   Pulse: 72   Weight: 250 lb 6.4 oz (113.6 kg)   Height: 5' 8.5\" (1.74 m)     Physical Examination:     General:  alert, cooperative, no distress   Lungs: lungs clear to auscultation   Cardiac: Regular rate and rhythm   Abdomen: soft, bowel sounds active, non-tender  No CVA tenderness   Incision: healing well   Pelvic: External genitalia: normal general appearance  Urinary system: urethral meatus normal  Vaginal: normal without tenderness, induration or masses  Cervix: absent  Adnexa: removed surgically  Uterus: absent   Rectal: not done   Extremity:   extremities normal, atraumatic, no cyanosis or edema     IMPRESSION:    Luz Sorensen is doing well postoperatively.   She has a diagnosis of stage IA, uterine carcinosarcoma with LVSI present. Pelvic washings negative. Medical problems:     Patient Active Problem List   Diagnosis Code    Hypertension I10    Chronic lung disease J98.4    Stroke ([facility] [address] I63.9    Uterine carcinosarcoma ([facility] [address] C55    Obesity (BMI 30.0-34. 9) E66.9    Elevated serum creatinine R79.89       PLAN:    The operative procedures and clinical results have been reviewed with the patient. Implications of diagnosis discussed at length. All questions answered. I have recommended systemic chemotherapy with Taxol/Carboplatin due to her significant risk of recurrence. I also discussed pelvic radiation therapy once she has completed chemotherapy. She was counseled on the toxicities of the chemotherapy regimen and her questions were answered. We will proceed with placement of an IV port to facilitate chemotherapy. I will see the patient back prior to each cycle of chemotherapy. The patient is advised to call our office with any problems or concerns.     Lety Elmore MD  3/1/2018/11:14 AM

## 2018-03-06 ENCOUNTER — TELEPHONE (OUTPATIENT)
Dept: GYNECOLOGY | Age: 63
End: 2018-03-06

## 2018-03-06 RX ORDER — LIDOCAINE AND PRILOCAINE 25; 25 MG/G; MG/G
CREAM TOPICAL
Qty: 30 G | Refills: 1 | Status: SHIPPED | OUTPATIENT
Start: 2018-03-06

## 2018-03-06 RX ORDER — DEXAMETHASONE 4 MG/1
TABLET ORAL
Qty: 36 TAB | Refills: 2 | Status: SHIPPED | OUTPATIENT
Start: 2018-03-06 | End: 2018-07-05

## 2018-03-06 RX ORDER — ONDANSETRON 4 MG/1
4 TABLET, ORALLY DISINTEGRATING ORAL
Qty: 30 TAB | Refills: 2 | Status: SHIPPED | OUTPATIENT
Start: 2018-03-06 | End: 2019-01-31 | Stop reason: ALTCHOICE

## 2018-03-06 NOTE — TELEPHONE ENCOUNTER
1st chemo appt at [facility] 3/21/18 at 10am given to pt.   I will mail remaining appts per pt request.

## 2018-03-06 NOTE — TELEPHONE ENCOUNTER
Requested Prescriptions     Signed Prescriptions Disp Refills    dexamethasone (DECADRON) 4 mg tablet 36 Tab 2     Sig: Take 2 tablets with breakfast the day before chemo and for 2 days after chemo     Authorizing Provider: Neel Harley     Ordering User: Kathy Benito    lidocaine-prilocaine (EMLA) topical cream 30 g 1     Sig: Apply small amount over port area one hour before chemo treatment and cover with a Band-Aid     Authorizing Provider: Neel Harley     Ordering User: Kathy Benito    ondansetron (ZOFRAN ODT) 4 mg disintegrating tablet 30 Tab 2     Sig: Take 1 Tab by mouth every eight (8) hours as needed for Nausea. Indications: CANCER CHEMOTHERAPY-INDUCED NAUSEA AND VOMITING     Authorizing Provider: Neel Harley     Ordering User: Kathy Benito     Rx sent pharmacy to be used during chemotherapy per vo Dr. Troy Hunt.

## 2018-03-07 ENCOUNTER — DOCUMENTATION ONLY (OUTPATIENT)
Dept: GYNECOLOGY | Age: 63
End: 2018-03-07

## 2018-03-08 ENCOUNTER — TELEPHONE (OUTPATIENT)
Dept: GYNECOLOGY | Age: 63
End: 2018-03-08

## 2018-03-08 NOTE — TELEPHONE ENCOUNTER
Pt called to state her insurance would not cover her Zofran, Decadron, and Emla cream and it would cost her 300.00 and she could not afford this. I called [facility] at [phone] and was told her insurace has paid and pt owes 19.68 and she feels she can afford this.

## 2018-03-13 ENCOUNTER — TELEPHONE (OUTPATIENT)
Dept: GYNECOLOGY | Age: 63
End: 2018-03-13

## 2018-03-14 ENCOUNTER — TELEPHONE (OUTPATIENT)
Dept: GYNECOLOGY | Age: 63
End: 2018-03-14

## 2018-03-14 RX ORDER — TERCONAZOLE 8 MG/G
CREAM VAGINAL
Qty: 20 G | Refills: 0 | Status: SHIPPED | OUTPATIENT
Start: 2018-03-14 | End: 2018-06-12 | Stop reason: ALTCHOICE

## 2018-03-14 NOTE — TELEPHONE ENCOUNTER
Spoke with pt, to give her address to [facility] for C1 chemotherapy and estimate of treatment time for her transportation.

## 2018-03-14 NOTE — TELEPHONE ENCOUNTER
The patient is requesting a refill of the Terazol cream. She states it helped with her symptoms but it is not gone completely.

## 2018-03-16 ENCOUNTER — ANESTHESIA EVENT (OUTPATIENT)
Dept: SURGERY | Age: 63
End: 2018-03-16
Payer: MEDICARE

## 2018-03-16 ENCOUNTER — APPOINTMENT (OUTPATIENT)
Dept: GENERAL RADIOLOGY | Age: 63
End: 2018-03-16
Attending: OBSTETRICS & GYNECOLOGY
Payer: MEDICARE

## 2018-03-16 ENCOUNTER — ANESTHESIA (OUTPATIENT)
Dept: SURGERY | Age: 63
End: 2018-03-16
Payer: MEDICARE

## 2018-03-16 ENCOUNTER — HOSPITAL ENCOUNTER (OUTPATIENT)
Age: 63
Setting detail: OUTPATIENT SURGERY
Discharge: HOME | End: 2018-03-16
Attending: OBSTETRICS & GYNECOLOGY | Admitting: OBSTETRICS & GYNECOLOGY
Payer: MEDICARE

## 2018-03-16 VITALS
BODY MASS INDEX: 37.03 KG/M2 | RESPIRATION RATE: 13 BRPM | OXYGEN SATURATION: 97 % | DIASTOLIC BLOOD PRESSURE: 59 MMHG | TEMPERATURE: 98.2 F | HEART RATE: 70 BPM | WEIGHT: 250 LBS | SYSTOLIC BLOOD PRESSURE: 99 MMHG | HEIGHT: 69 IN

## 2018-03-16 DIAGNOSIS — C55 UTERINE CARCINOSARCOMA (HCC): Primary | ICD-10-CM

## 2018-03-16 PROCEDURE — C1788 PORT, INDWELLING, IMP: HCPCS | Performed by: OBSTETRICS & GYNECOLOGY

## 2018-03-16 PROCEDURE — 77030031139 HC SUT VCRL2 J&J -A: Performed by: OBSTETRICS & GYNECOLOGY

## 2018-03-16 PROCEDURE — 76210000021 HC REC RM PH II 0.5 TO 1 HR: Performed by: OBSTETRICS & GYNECOLOGY

## 2018-03-16 PROCEDURE — 74011000250 HC RX REV CODE- 250

## 2018-03-16 PROCEDURE — 77030002986 HC SUT PROL J&J -A: Performed by: OBSTETRICS & GYNECOLOGY

## 2018-03-16 PROCEDURE — 74011000250 HC RX REV CODE- 250: Performed by: OBSTETRICS & GYNECOLOGY

## 2018-03-16 PROCEDURE — 74011250636 HC RX REV CODE- 250/636: Performed by: ANESTHESIOLOGY

## 2018-03-16 PROCEDURE — 74011250636 HC RX REV CODE- 250/636

## 2018-03-16 PROCEDURE — 76060000032 HC ANESTHESIA 0.5 TO 1 HR: Performed by: OBSTETRICS & GYNECOLOGY

## 2018-03-16 PROCEDURE — 77030011640 HC PAD GRND REM COVD -A: Performed by: OBSTETRICS & GYNECOLOGY

## 2018-03-16 PROCEDURE — 77030020782 HC GWN BAIR PAWS FLX 3M -B

## 2018-03-16 PROCEDURE — 76010000138 HC OR TIME 0.5 TO 1 HR: Performed by: OBSTETRICS & GYNECOLOGY

## 2018-03-16 PROCEDURE — 74011250636 HC RX REV CODE- 250/636: Performed by: OBSTETRICS & GYNECOLOGY

## 2018-03-16 PROCEDURE — 74011000258 HC RX REV CODE- 258: Performed by: OBSTETRICS & GYNECOLOGY

## 2018-03-16 PROCEDURE — 77030002933 HC SUT MCRYL J&J -A: Performed by: OBSTETRICS & GYNECOLOGY

## 2018-03-16 PROCEDURE — 76000 FLUOROSCOPY <1 HR PHYS/QHP: CPT

## 2018-03-16 PROCEDURE — 71045 X-RAY EXAM CHEST 1 VIEW: CPT

## 2018-03-16 PROCEDURE — 77030020256 HC SOL INJ NACL 0.9%  500ML: Performed by: OBSTETRICS & GYNECOLOGY

## 2018-03-16 PROCEDURE — 76210000016 HC OR PH I REC 1 TO 1.5 HR: Performed by: OBSTETRICS & GYNECOLOGY

## 2018-03-16 DEVICE — SYSTEM INFUS PRT CATH 8FR L66CM INTRO 8FR CHST TI SGL LUMN
Type: IMPLANTABLE DEVICE | Site: CHEST  WALL | Status: NON-FUNCTIONAL
Removed: 2020-02-28

## 2018-03-16 RX ORDER — MIDAZOLAM HYDROCHLORIDE 1 MG/ML
INJECTION, SOLUTION INTRAMUSCULAR; INTRAVENOUS AS NEEDED
Status: DISCONTINUED | OUTPATIENT
Start: 2018-03-16 | End: 2018-03-16 | Stop reason: HOSPADM

## 2018-03-16 RX ORDER — SODIUM CHLORIDE, SODIUM LACTATE, POTASSIUM CHLORIDE, CALCIUM CHLORIDE 600; 310; 30; 20 MG/100ML; MG/100ML; MG/100ML; MG/100ML
INJECTION, SOLUTION INTRAVENOUS
Status: DISCONTINUED | OUTPATIENT
Start: 2018-03-16 | End: 2018-03-16 | Stop reason: HOSPADM

## 2018-03-16 RX ORDER — HYDROMORPHONE HYDROCHLORIDE 1 MG/ML
0.5 INJECTION, SOLUTION INTRAMUSCULAR; INTRAVENOUS; SUBCUTANEOUS
Status: DISCONTINUED | OUTPATIENT
Start: 2018-03-16 | End: 2018-03-16 | Stop reason: HOSPADM

## 2018-03-16 RX ORDER — KETAMINE HYDROCHLORIDE 10 MG/ML
INJECTION, SOLUTION INTRAMUSCULAR; INTRAVENOUS AS NEEDED
Status: DISCONTINUED | OUTPATIENT
Start: 2018-03-16 | End: 2018-03-16 | Stop reason: HOSPADM

## 2018-03-16 RX ORDER — MORPHINE SULFATE 10 MG/ML
2 INJECTION, SOLUTION INTRAMUSCULAR; INTRAVENOUS
Status: DISCONTINUED | OUTPATIENT
Start: 2018-03-16 | End: 2018-03-16 | Stop reason: HOSPADM

## 2018-03-16 RX ORDER — FENTANYL CITRATE 50 UG/ML
INJECTION, SOLUTION INTRAMUSCULAR; INTRAVENOUS AS NEEDED
Status: DISCONTINUED | OUTPATIENT
Start: 2018-03-16 | End: 2018-03-16 | Stop reason: HOSPADM

## 2018-03-16 RX ORDER — HEPARIN 100 UNIT/ML
SYRINGE INTRAVENOUS AS NEEDED
Status: DISCONTINUED | OUTPATIENT
Start: 2018-03-16 | End: 2018-03-16 | Stop reason: HOSPADM

## 2018-03-16 RX ORDER — SODIUM CHLORIDE, SODIUM LACTATE, POTASSIUM CHLORIDE, CALCIUM CHLORIDE 600; 310; 30; 20 MG/100ML; MG/100ML; MG/100ML; MG/100ML
100 INJECTION, SOLUTION INTRAVENOUS CONTINUOUS
Status: DISCONTINUED | OUTPATIENT
Start: 2018-03-16 | End: 2018-03-16 | Stop reason: HOSPADM

## 2018-03-16 RX ORDER — SODIUM CHLORIDE 0.9 % (FLUSH) 0.9 %
5-10 SYRINGE (ML) INJECTION EVERY 8 HOURS
Status: DISCONTINUED | OUTPATIENT
Start: 2018-03-16 | End: 2018-03-16 | Stop reason: HOSPADM

## 2018-03-16 RX ORDER — MIDAZOLAM HYDROCHLORIDE 1 MG/ML
0.5 INJECTION, SOLUTION INTRAMUSCULAR; INTRAVENOUS
Status: DISCONTINUED | OUTPATIENT
Start: 2018-03-16 | End: 2018-03-16 | Stop reason: HOSPADM

## 2018-03-16 RX ORDER — ACETAMINOPHEN 10 MG/ML
INJECTION, SOLUTION INTRAVENOUS AS NEEDED
Status: DISCONTINUED | OUTPATIENT
Start: 2018-03-16 | End: 2018-03-16 | Stop reason: HOSPADM

## 2018-03-16 RX ORDER — KETOROLAC TROMETHAMINE 30 MG/ML
INJECTION, SOLUTION INTRAMUSCULAR; INTRAVENOUS AS NEEDED
Status: DISCONTINUED | OUTPATIENT
Start: 2018-03-16 | End: 2018-03-16 | Stop reason: HOSPADM

## 2018-03-16 RX ORDER — PROPOFOL 10 MG/ML
INJECTION, EMULSION INTRAVENOUS
Status: DISCONTINUED | OUTPATIENT
Start: 2018-03-16 | End: 2018-03-16 | Stop reason: HOSPADM

## 2018-03-16 RX ORDER — ONDANSETRON 2 MG/ML
INJECTION INTRAMUSCULAR; INTRAVENOUS AS NEEDED
Status: DISCONTINUED | OUTPATIENT
Start: 2018-03-16 | End: 2018-03-16 | Stop reason: HOSPADM

## 2018-03-16 RX ORDER — SODIUM CHLORIDE 0.9 % (FLUSH) 0.9 %
5-10 SYRINGE (ML) INJECTION AS NEEDED
Status: DISCONTINUED | OUTPATIENT
Start: 2018-03-16 | End: 2018-03-16 | Stop reason: HOSPADM

## 2018-03-16 RX ORDER — SODIUM CHLORIDE, SODIUM LACTATE, POTASSIUM CHLORIDE, CALCIUM CHLORIDE 600; 310; 30; 20 MG/100ML; MG/100ML; MG/100ML; MG/100ML
25 INJECTION, SOLUTION INTRAVENOUS CONTINUOUS
Status: DISCONTINUED | OUTPATIENT
Start: 2018-03-16 | End: 2018-03-16 | Stop reason: HOSPADM

## 2018-03-16 RX ORDER — MIDAZOLAM HYDROCHLORIDE 1 MG/ML
1 INJECTION, SOLUTION INTRAMUSCULAR; INTRAVENOUS AS NEEDED
Status: DISCONTINUED | OUTPATIENT
Start: 2018-03-16 | End: 2018-03-16 | Stop reason: HOSPADM

## 2018-03-16 RX ORDER — ONDANSETRON 2 MG/ML
4 INJECTION INTRAMUSCULAR; INTRAVENOUS AS NEEDED
Status: DISCONTINUED | OUTPATIENT
Start: 2018-03-16 | End: 2018-03-16 | Stop reason: HOSPADM

## 2018-03-16 RX ORDER — LIDOCAINE HYDROCHLORIDE 20 MG/ML
INJECTION, SOLUTION EPIDURAL; INFILTRATION; INTRACAUDAL; PERINEURAL AS NEEDED
Status: DISCONTINUED | OUTPATIENT
Start: 2018-03-16 | End: 2018-03-16 | Stop reason: HOSPADM

## 2018-03-16 RX ORDER — ROPIVACAINE HYDROCHLORIDE 5 MG/ML
30 INJECTION, SOLUTION EPIDURAL; INFILTRATION; PERINEURAL AS NEEDED
Status: DISCONTINUED | OUTPATIENT
Start: 2018-03-16 | End: 2018-03-16 | Stop reason: HOSPADM

## 2018-03-16 RX ORDER — OXYCODONE AND ACETAMINOPHEN 5; 325 MG/1; MG/1
1 TABLET ORAL
Qty: 10 TAB | Refills: 0 | Status: SHIPPED | OUTPATIENT
Start: 2018-03-16 | End: 2019-01-31 | Stop reason: ALTCHOICE

## 2018-03-16 RX ORDER — DIPHENHYDRAMINE HYDROCHLORIDE 50 MG/ML
12.5 INJECTION, SOLUTION INTRAMUSCULAR; INTRAVENOUS AS NEEDED
Status: DISCONTINUED | OUTPATIENT
Start: 2018-03-16 | End: 2018-03-16 | Stop reason: HOSPADM

## 2018-03-16 RX ORDER — SODIUM CHLORIDE 9 MG/ML
25 INJECTION, SOLUTION INTRAVENOUS CONTINUOUS
Status: DISCONTINUED | OUTPATIENT
Start: 2018-03-16 | End: 2018-03-16 | Stop reason: HOSPADM

## 2018-03-16 RX ORDER — FENTANYL CITRATE 50 UG/ML
50 INJECTION, SOLUTION INTRAMUSCULAR; INTRAVENOUS AS NEEDED
Status: DISCONTINUED | OUTPATIENT
Start: 2018-03-16 | End: 2018-03-16 | Stop reason: HOSPADM

## 2018-03-16 RX ORDER — FENTANYL CITRATE 50 UG/ML
25 INJECTION, SOLUTION INTRAMUSCULAR; INTRAVENOUS
Status: DISCONTINUED | OUTPATIENT
Start: 2018-03-16 | End: 2018-03-16 | Stop reason: HOSPADM

## 2018-03-16 RX ORDER — LIDOCAINE HYDROCHLORIDE 10 MG/ML
0.1 INJECTION, SOLUTION EPIDURAL; INFILTRATION; INTRACAUDAL; PERINEURAL AS NEEDED
Status: DISCONTINUED | OUTPATIENT
Start: 2018-03-16 | End: 2018-03-16 | Stop reason: HOSPADM

## 2018-03-16 RX ADMIN — PROPOFOL 100 MCG/KG/MIN: 10 INJECTION, EMULSION INTRAVENOUS at 11:20

## 2018-03-16 RX ADMIN — SODIUM CHLORIDE, SODIUM LACTATE, POTASSIUM CHLORIDE, AND CALCIUM CHLORIDE 25 ML/HR: 600; 310; 30; 20 INJECTION, SOLUTION INTRAVENOUS at 09:28

## 2018-03-16 RX ADMIN — CEFOTETAN DISODIUM 2 G: 2 INJECTION, POWDER, FOR SOLUTION INTRAMUSCULAR; INTRAVENOUS at 11:20

## 2018-03-16 RX ADMIN — ONDANSETRON 4 MG: 2 INJECTION INTRAMUSCULAR; INTRAVENOUS at 11:37

## 2018-03-16 RX ADMIN — SODIUM CHLORIDE, SODIUM LACTATE, POTASSIUM CHLORIDE, CALCIUM CHLORIDE: 600; 310; 30; 20 INJECTION, SOLUTION INTRAVENOUS at 11:09

## 2018-03-16 RX ADMIN — MIDAZOLAM HYDROCHLORIDE 2 MG: 1 INJECTION, SOLUTION INTRAMUSCULAR; INTRAVENOUS at 11:09

## 2018-03-16 RX ADMIN — FENTANYL CITRATE 50 MCG: 50 INJECTION, SOLUTION INTRAMUSCULAR; INTRAVENOUS at 11:25

## 2018-03-16 RX ADMIN — ACETAMINOPHEN 1000 MG: 10 INJECTION, SOLUTION INTRAVENOUS at 11:29

## 2018-03-16 RX ADMIN — KETOROLAC TROMETHAMINE 30 MG: 30 INJECTION, SOLUTION INTRAMUSCULAR; INTRAVENOUS at 11:45

## 2018-03-16 RX ADMIN — FENTANYL CITRATE 50 MCG: 50 INJECTION, SOLUTION INTRAMUSCULAR; INTRAVENOUS at 11:15

## 2018-03-16 RX ADMIN — LIDOCAINE HYDROCHLORIDE 60 MG: 20 INJECTION, SOLUTION EPIDURAL; INFILTRATION; INTRACAUDAL; PERINEURAL at 11:20

## 2018-03-16 RX ADMIN — KETAMINE HYDROCHLORIDE 20 MG: 10 INJECTION, SOLUTION INTRAMUSCULAR; INTRAVENOUS at 11:29

## 2018-03-16 NOTE — IP AVS SNAPSHOT
2700 [facility] [address] [place] [address] 
[phone] Patient: Gisel Ramirez MRN: [id number] [facility]:6/76/9489 About your hospitalization You were admitted on:  March 16, 2018 [facility] You were discharged on:  March 16, 2018 Why you were hospitalized Your primary diagnosis was:  Not on File Follow-up Information Follow up With Details Comments Contact Info Joao Arreola, [address] [place] [phone] Miles Bell MD Schedule an appointment as soon as possible for a visit in 1 month(s)  [address] [facility] [address] [facility] [address] [place] 
[phone] Your Scheduled Appointments Wednesday March 21, 2018 10:00 AM EDT INFUSION 15 RI with Douglas INFUSION NURSE 1  
[address] ([address]) 11 [facility] 70 [facility] [address] [phone] Monday April 09, 2018 10:00 AM EDT CHEMOTHERAPY with Sarai Díaz, MARISA [facility] 200 [facility] [address] [id number] [phone] Wednesday April 11, 2018  8:00 AM EDT Infusion with CHEN 102[address]- CHAIR CARL [facility]  
[address] [facility] ([address]) [address] [phone] Go to [address], ground floor. Thursday April 26, 2018 11:30 AM EDT CHEMOTHERAPY with Miles Bell MD  
[facility] Gynecologic Oncology [facility]) 200 [facility] [address] [id number] [phone] Wednesday May 02, 2018  8:00 AM EDT Infusion with CARL FT CHAIR 1  
[address] [facility] ([address]) [address] [phone] Go to [address] [facility] ground [facility]. Discharge Orders None A check haven indicates which time of day the medication should be taken. My Medications START taking these medications Instructions Each Dose to Equal  
 Morning Noon Evening Bedtime  
 oxyCODONE-acetaminophen 5-325 mg per tablet Commonly known as:  PERCOCET Your last dose was: Your next dose is: Take 1 Tab by mouth every four (4) hours as needed for Pain. Max Daily Amount: 6 Tabs. 1 Tab CONTINUE taking these medications Instructions Each Dose to Equal  
 Morning Noon Evening Bedtime  
 amLODIPine 10 mg tablet Commonly known as:  Edson Soles Your last dose was: Your next dose is: Take 10 mg by mouth daily. 10 mg  
    
   
   
   
  
 aspirin 325 mg tablet Commonly known as:  ASPIRIN Your last dose was: Your next dose is: Take 325 mg by mouth daily. 325 mg  
    
   
   
   
  
 atorvastatin 10 mg tablet Commonly known as:  LIPITOR Your last dose was: Your next dose is: Take 10 mg by mouth daily. 10 mg CALCIUM 600 WITH VITAMIN D3 600 mg(1,500mg) -400 unit Chew Generic drug:  Calcium-Cholecalciferol (D3) Your last dose was: Your next dose is: Take 1 Tab by mouth daily. 1 Tab CENTRUM SILVER ULTRA WOMEN'S PO Your last dose was: Your next dose is: Take  by mouth. dexamethasone 4 mg tablet Commonly known as:  DECADRON Your last dose was: Your next dose is: Take 2 tablets with breakfast the day before chemo and for 2 days after chemo  
     
   
   
   
  
 gabapentin 300 mg capsule Commonly known as:  NEURONTIN Your last dose was: Your next dose is: Take 300 mg by mouth. 300 mg  
    
   
   
   
  
 lidocaine-prilocaine topical cream  
Commonly known as:  EMLA Your last dose was: Your next dose is:    
   
   
 Apply small amount over port area one hour before chemo treatment and cover with a Band-Aid  
     
   
   
   
  
 meclizine 25 mg tablet Commonly known as:  ANTIVERT Your last dose was: Your next dose is: Take 25 mg by mouth as needed. 25 mg  
    
   
   
   
  
 metoprolol tartrate 50 mg tablet Commonly known as:  LOPRESSOR Your last dose was: Your next dose is: Take 50 mg by mouth two (2) times a day. 50 mg  
    
   
   
   
  
 ondansetron 4 mg disintegrating tablet Commonly known as:  ZOFRAN ODT Your last dose was: Your next dose is: Take 1 Tab by mouth every eight (8) hours as needed for Nausea. Indications: CANCER CHEMOTHERAPY-INDUCED NAUSEA AND VOMITING  
 4 mg  
    
   
   
   
  
 oxybutynin 5 mg tablet Commonly known as:  AYVJHNLF Your last dose was: Your next dose is: Take 15 mg by mouth two (2) times a day. 15 mg PLAVIX 75 mg Tab Generic drug:  clopidogrel Your last dose was: Your next dose is: Take 75 mg by mouth daily. 75 mg  
    
   
   
   
  
 polyethylene glycol 17 gram/dose powder Commonly known as:  Reche Agreste Your last dose was: Your next dose is: Take 17 g by mouth daily. 17 g  
    
   
   
   
  
 terconazole 0.8 % vaginal cream  
Commonly known as:  TERAZOL 3 Your last dose was: Your next dose is:    
   
   
 Apply topically to the vulva twice daily * traMADol 50 mg tablet Commonly known as:  ULTRAM  
   
Your last dose was: Your next dose is: Take 50 mg by mouth nightly.   
 50 mg  
    
   
   
   
  
 * TRAMADOL PO  
   
 Your last dose was: Your next dose is: Take 100 mg by mouth every morning. 100 mg * Notice: This list has 2 medication(s) that are the same as other medications prescribed for you. Read the directions carefully, and ask your doctor or other care provider to review them with you. Where to Get Your Medications Information on where to get these meds will be given to you by the nurse or doctor. ! Ask your nurse or doctor about these medications  
  oxyCODONE-acetaminophen 5-325 mg per tablet Discharge Instructions Instructions Following Ambulatory Surgery Activity · As tolerated and directed by your doctor · Bathe or shower as directed by your doctor Diet · Light diet for the first day · Advance to regular diet on second day, unless your doctor orders otherwise · If nausea and vomiting continues, call your doctor Pain · Take pain medication as directed by your doctor ·  Call your doctor if pain is NOT relieved by medication · DO NOT take aspirin or blood thinners until directed by your doctor Dressing Care: Leave dressing in place Follow-Up Phone Calls · Will be made nursing staff · If you have any problems, call your doctor as needed Call your doctor if 
· Excessive bleeding that does not stop after holding mild pressure over the area · Temperature of 101 degrees F or above · Redness,excessive swelling or bruising, and/or green or yellow, smelly discharge from incision After Anesthesia · For the first 24 hours: DO NOT Drive, Drink alcoholic beverages, or Make important decisions · Be aware of dizziness following anesthesia and while taking pain medication Implanted [facility] for Chemotherapy: Care Instructions Your Care Instructions An implanted port is a device placed, in most cases, under the skin of your chest below your collarbone.  It is made of plastic, stainless steel, or titanium. The port is about the size of a quarter, but thicker. A thin, flexible tube called a catheter runs from the port into a large vein. A membrane (septum) similar to a pencil eraser is in the center of the port. A nurse uses a needle to put chemotherapy or other medicine and fluids through the septum into a blood vessel. A health professional also can take blood for tests through the port. An implanted port can be used for months. A special needle (called a Summers needle) may stay in the port for a short time. The port and catheter need regular care to make sure they do not get blocked. Tell your doctor if you take aspirin or some other blood thinner. These medicines can increase the chance of bleeding inside your body. Follow-up care is a key part of your treatment and safety. Be sure to make and go to all appointments, and call your doctor if you are having problems. It's also a good idea to know your test results and keep a list of the medicines you take. How can you care for yourself at home? · You will probably need to take 1 day off from work and will be able return to normal activities shortly after. This depends on the type of work you do, why you have the port, and how you feel. · You probably will be able to bathe and swim. But you may need to avoid some activities if a Summers needle is left in the port. Talk to your doctor about any limits on your activity. · Some clothes may rub the skin over the port. Do not wear a bra or suspenders that irritate your skin near the port. Do not have your blood pressure taken on the arm with the port. · You will get a medical alert card with information about your port. Carry this with you. It will tell health care workers you have a port in case you need emergency care. · Your port will need regular flushing to keep it open. A nurse or other health professional will do this for you. When should you call for help? Call your doctor now or seek immediate medical care if: 
? · You have signs of infection, such as: 
¨ Increased pain, swelling, warmth, or redness near the port. ¨ Red streaks leading from the port. ¨ Pus draining from the port. ¨ A fever. ? · You have pain or swelling in your neck or arm. ? · You have trouble breathing. ? Watch closely for changes in your health, and be sure to contact your doctor if: 
? · You have any problems with your port. Where can you learn more? Go to http://mario-neil.info/. Enter [phone] in the search box to learn more about \"Implanted [employer] [facility] [place] for Chemotherapy: Care Instructions. \" Current as of: March 20, 2017 Content Version: 11.4 © 9556-8420 Healthwise, Incorporated. Care instructions adapted under license by [employer] (which disclaims liability or warranty for this information). If you have questions about a medical condition or this instruction, always ask your healthcare professional. [place] [address] any warranty or liability for your use of this information. Introducing [facility]! [facility] introduces Soufun patient portal. Now you can access parts of your medical record, email your doctor's office, and request medication refills online. 1. In your internet browser, go to [email or web address] 2. Click on the First Time User? Click Here link in the Sign In box. You will see the New Member Sign Up page. 3. Enter your Soufun Access Code exactly as it appears below. You will not need to use this code after youve completed the sign-up process. If you do not sign up before the expiration date, you must request a new code. · Soufun Access Code: [email or web address] Expires: 4/18/2018 12:19 PM 
 
4. Enter the last four digits of your Social Security Number (xxxx) and Date of Birth (mm/dd/yyyy) as indicated and click Submit. You will be taken to the next sign-up page. 5. Create a Venuu ID. This will be your Venuu login ID and cannot be changed, so think of one that is secure and easy to remember. 6. Create a Venuu password. You can change your password at any time. 7. Enter your Password Reset Question and Answer. This can be used at a later time if you forget your password. 8. Enter your e-mail address. You will receive e-mail notification when new information is available in [address] 9. Click Sign Up. You can now view and download portions of your medical record. 10. Click the Download Summary menu link to download a portable copy of your medical information. If you have questions, please visit the Frequently Asked Questions section of the Venuu website. Remember, Venuu is NOT to be used for urgent needs. For medical emergencies, dial 911. Now available from your iPhone and Android! Unresulted Labs-Please follow up with your PCP about these lab tests Order Current Status XR FLUOROSCOPY UNDER 60 MINUTES In process Providers Seen During Your Hospitalization Provider Specialty Primary office phone Gumaro Ambrocio MD Gynecologic Oncology [phone] Your Primary Care Physician (PCP) Primary Care Physician Office Phone Office Fax Leonid Shankar [phone] You are allergic to the following Allergen Reactions Latex Itching Pcn (Penicillins) Unknown (comments) Pt's states she doesn't know reaction. Recent Documentation Height Weight BMI OB Status Smoking Status 1.74 m 113.4 kg 37.46 kg/m2 Hysterectomy Former Smoker Emergency Contacts Name Discharge Info Relation Home Work Mobile Arleen Bob DISCHARGE CAREGIVER [3] Other Relative [6] [phone] Patient Belongings The following personal items are in your possession at time of discharge: 
                   Clothing:  (2 clothing bags returned to pt in pacu) Discharge Instructions Attachments/References MEFS - OXYCODONE/ACETAMINOPHEN (PERCOCET, ROXICET) - (BY MOUTH) (ENGLISH) Patient Handouts Oxycodone/Acetaminophen (Percocet, Roxicet) - (By mouth) Why this medicine is used:  
Treats pain. This medicine contains a narcotic pain reliever. Contact a nurse or doctor right away if you have: 
· Extreme weakness, shallow breathing, slow heartbeat · Sweating or cold, clammy skin · Skin blisters, rash, or peeling Common side effects: 
· Constipation · Nausea, vomiting · Tiredness © 2017 [facility] Information is for End User's use only and may not be sold, redistributed or otherwise used for commercial purposes. Please provide this summary of care documentation to your next provider. Signatures-by signing, you are acknowledging that this After Visit Summary has been reviewed with you and you have received a copy. Patient Signature:  ____________________________________________________________ Date:  ____________________________________________________________  
  
Earle Wiseman Provider Signature:  ____________________________________________________________ Date:  ____________________________________________________________

## 2018-03-16 NOTE — ANESTHESIA PREPROCEDURE EVALUATION
Anesthetic History   No history of anesthetic complications            Review of Systems / Medical History  Patient summary reviewed, nursing notes reviewed and pertinent labs reviewed    Pulmonary  Within defined limits                 Neuro/Psych         TIA     Cardiovascular    Hypertension              Exercise tolerance: >4 METS     GI/Hepatic/Renal  Within defined limits              Endo/Other        Morbid obesity and cancer     Other Findings            Physical Exam    Airway  Mallampati: II  TM Distance: 4 - 6 cm  Neck ROM: normal range of motion   Mouth opening: Normal     Cardiovascular  Regular rate and rhythm,  S1 and S2 normal,  no murmur, click, rub, or gallop             Dental  No notable dental hx       Pulmonary  Breath sounds clear to auscultation               Abdominal  GI exam deferred       Other Findings            Anesthetic Plan    ASA: 3  Anesthesia type: MAC          Induction: Intravenous  Anesthetic plan and risks discussed with: Patient

## 2018-03-16 NOTE — BRIEF OP NOTE
BRIEF OPERATIVE NOTE    Date of Procedure: 3/16/2018   Preoperative Diagnosis: UTERINE CARCINOMA  Postoperative Diagnosis: UTERINE CARCINOMA    Procedure(s):  PORT A CATH INSERTION  Surgeon(s) and Role:     * Hayes Pringle MD - Primary      Assistant Staff: None  Surgical Staff:  Circ-1: Nelli Stevenson RN  Circ-2: Elyssa Tate RN  Radiology Technician: Rigoberto Kelly RT, R  Scrub Tech-1: Anton Pinedaub RN-Relief: Elyssa Tate RN  Float Staff: Chika Pantoja RN  Event Time In   Incision Start 1134   Incision Close 1156     Anesthesia: General   Estimated Blood Loss: 5 cc  Specimens: * No specimens in log *   Findings: Normal anatomy. Complications: None  Implants:   Implant Name Type Inv.  Item Serial No.  Lot No. LRB No. Used Action   PORT VASC INFUS SET 8FR TI -- SMART PORT CT - SN/A   PORT VASC INFUS SET 8FR TI -- SMART PORT CT N/A [employer] [id number] N/A 1 Implanted       Keron Packer MD

## 2018-03-16 NOTE — OP NOTES
Gynecologic Oncology Operative Report    Luz Romeo  3/16/2018    Pre-operative diagnosis:   1) Uterine carcinosarcoma      2) Requires venous access for chemotherapy     Post-operative diagnosis: 1) Uterine carcinosarcoma      2) Requires venous access for chemotherapy    Procedure:  IV Mediport placement    Surgeon:  Roger Bravo MD    Assistant:  N/A    Anesthesia:  MAC, plus local    EBL:  Minimal    Complications:  None    Operative indications:  60 yo BF with uterine carcinosarcoma. She was recommended adjuvant chemotherapy. Procedure in detail:  After the risks, benefits, indications, and alternatives of the procedure were discussed with the patient and informed consent was obtained, the patient was taken to the operating room. She was then correctly identified, administered IV sedation per anesthesia, and then prepped and draped in the supine position in the usual fashion. Her arms were also tucked at each side. The patient was then placed in slight Trendelenburg tilt and the anatomy of the anterior thoracic wall was noted. 1% lidocaine without epinephrine was then injected just underneath the left mid clavicle to provide local anesthesia. The left subclavian vein was then located and entered with an 18-gauge needle. A guidewire was then inserted through the needle and proper placement was confirmed by fluoroscopy. A small incision was made with an #11-blade scalpel at the insertion site and the catheter introducer and dilator were inserted over the guidewire. The guidewire and dilator were then removed. The 8-Wallisian catheter was then inserted through the introducer to a depth of 20 cm at skin level. Proper placement was confirmed by fluoroscopy with the catheter tip positioned in the superior vena cava just above the right atrium. The peel-away catheter introducer was then removed. Good flow and return were noted through the catheter.   Attention was then directed to creation of the port pocket. The location of the pocket was selected and 1% lidocaine was injected. The site was approximately 4 cm caudad to the catheter insertion site. An  4 cm skin incision was made transversely with a #15-blade scalpel. The incision was then carried down to the pectoralis fascia with electro-cautery. A port pocket was then bluntly created, large enough to accommodate the standard-size [employer] Smartport. The port was then secured to the pectoralis fascia with 2-0 Proline suture at three locations. The catheter was then tunneled from the insertion site to the port site using the tunneling device. The catheter was cut to fit and then attached to the port. The catheter was secured to the port using the locking device. The port was then accessed with a Summers needle and good glow and returned were noted. The port was then flushed with concentrated Heparin solution. The port pocket was then irrigated and made hemostatic with electro-cautery. The incision was then closed in two layers. The subcutaneous fat was reapproximated with a running 3-0 Vicryl. The skin was then reapproximated with a 4-0 Monocryl in a running subcuticular fashion. Steri-strips were then applied, followed by a sterile pressure dressing. The patient was then awakened from anesthesia and taken to the recovery room in stable condition. All instrument, sponge, and needle counts were correct. A chest X-ray was ordered for the recovery room, but the results are pending at the time of this dictation.       Miles Bell MD  3/16/2018  11:57 AM

## 2018-03-16 NOTE — PERIOP NOTES
Patient: Lilian Solomon MRN: [id number]  SSN: [id number]   YOB: 1955  Age: 61 y.o. Sex: female     Patient is status post Procedure(s):  PORT A CATH INSERTION. Surgeon(s) and Role:     * Narcisa Mcdonnell MD - Primary    Local/Dose/Irrigation:  See [facility]                Venous Access Device 03/16/18 Upper chest (subclavicular area), left (Active)      Peripheral IV 03/16/18 Left Hand (Active)                           Dressing/Packing:  Wound Chest Anterior;Left;Upper-DRESSING TYPE: 2 x 2;Adhesive wound closure strips (Steri-Strips); Adhesive wound dressing (Mastisol); Transparent film (03/16/18 1100)

## 2018-03-16 NOTE — H&P
[facility] [address]  P [phone]  F [phone]    Office Note  Patient ID:  Name:  Josep Dias  MRN:  [id number]  :  1955/63 y.o. Date:  3/16/2018      HISTORY OF PRESENT ILLNESS:  Luz Medina is a 61 y.o.  postmenopausal female with a diagnosis of stage IA uterine carcinosarcoma. She underwent robotic hysterectomy with staging in late 2018. Pathology revealed LVSI but her sentinel nodes were negative. Pelvic washings also negative. She was recommended adjuvant chemotherapy with Taxol/Carboplatin due to her risk of recurrence. ROS:   and GI review:  Negative  Cardiopulmonary review:  Negative   Musculoskeletal:  Negative    A comprehensive review of systems was negative except for that written in the History of Present Illness.  , 10 point ROS      Problem List:  Patient Active Problem List    Diagnosis Date Noted    Elevated serum creatinine 2018    Uterine carcinosarcoma ([address] 2018    Obesity (BMI 30.0-34.9) 2018    Hypertension 2017    Chronic lung disease 2017    Stroke ([address] 2017     PMH:  Past Medical History:   Diagnosis Date    Cancer ([address]     UTERINE CA    Chronic pain     Hypercholesterolemia     Hypertension     Stroke ([address] 2007    R SIDE AFFECTED      PSH:  Past Surgical History:   Procedure Laterality Date    COLONOSCOPY,DIAGNOSTIC  2015         HX BREAST BIOPSY Right 1993    right breast:  benign    HX GI      COLONOSCOPY    HX ORTHOPAEDIC      GREAT L TOE BONE SPUR REMOVED      Social History:  Social History   Substance Use Topics    Smoking status: Former Smoker     Quit date: 10/4/1991    Smokeless tobacco: Never Used    Alcohol use No      Family History:  Family History   Problem Relation Age of Onset    Heart Disease Mother     Hypertension Mother     Diabetes Mother     Cancer Mother 62     breast cancer    Breast Cancer Mother      52's    Cancer Father 61     lung cancer    Stroke Brother     Stroke Maternal Grandmother     Stroke Maternal Grandfather     Kidney Disease Brother     Hypertension Brother       Medications: (reviewed)  No current facility-administered medications for this encounter. Current Outpatient Prescriptions   Medication Sig    terconazole (TERAZOL 3) 0.8 % vaginal cream Apply topically to the vulva twice daily    dexamethasone (DECADRON) 4 mg tablet Take 2 tablets with breakfast the day before chemo and for 2 days after chemo    lidocaine-prilocaine (EMLA) topical cream Apply small amount over port area one hour before chemo treatment and cover with a Band-Aid    ondansetron (ZOFRAN ODT) 4 mg disintegrating tablet Take 1 Tab by mouth every eight (8) hours as needed for Nausea. Indications: CANCER CHEMOTHERAPY-INDUCED NAUSEA AND VOMITING    polyethylene glycol (MIRALAX) 17 gram/dose powder Take 17 g by mouth daily.  amLODIPine (NORVASC) 10 mg tablet Take 10 mg by mouth daily.  Calcium-Cholecalciferol, D3, (CALCIUM 600 WITH VITAMIN D3) 600 mg(1,500mg) -400 unit chew Take 1 Tab by mouth daily.  meclizine (ANTIVERT) 25 mg tablet Take 25 mg by mouth as needed.  traMADol (ULTRAM) 50 mg tablet Take 50 mg by mouth nightly.  TRAMADOL HCL (TRAMADOL PO) Take 100 mg by mouth every morning.  aspirin (ASPIRIN) 325 mg tablet Take 325 mg by mouth daily.  atorvastatin (LIPITOR) 10 mg tablet Take 10 mg by mouth daily.  MULTIVITS W-FE,OTHER MIN/LUT (CENTRUM SILVER ULTRA WOMEN'S PO) Take  by mouth.  clopidogrel (PLAVIX) 75 mg tablet Take 75 mg by mouth daily.  gabapentin (NEURONTIN) 300 mg capsule Take 300 mg by mouth.  metoprolol (LOPRESSOR) 50 mg tablet Take 50 mg by mouth two (2) times a day.  oxybutynin (DITROPAN) 5 mg tablet Take 15 mg by mouth two (2) times a day.      Allergies: (reviewed)  Allergies   Allergen Reactions    Pcn [Penicillins] Unknown (comments)     Pt's states she doesn't know reaction. OBJECTIVE:    Physical Exam:  VITAL SIGNS: There were no vitals filed for this visit. There is no height or weight on file to calculate BMI. GENERAL MELVIN: Conversant, alert, oriented. No acute distress. HEENT: HEENT. No thyroid enlargement. No JVD. Neck: Supple without restrictions. RESPIRATORY: Clear to auscultation and percussion to the bases. No CVAT. CARDIOVASC: RRR without murmur/rub. GASTROINT: soft, non-tender, without masses or organomegaly   MUSCULOSKEL: no joint tenderness, deformity or swelling   EXTREMITIES: extremities normal, atraumatic, no cyanosis or edema   PELVIC: Deferred   RECTAL: Deferred   JOHN SURVEY: No suspicious lymphadenopathy or edema noted. NEURO: Grossly intact. No acute deficit. Lab Results   Component Value Date/Time    WBC 13.1 (H) 02/01/2018 06:08 AM    HGB 13.0 02/01/2018 06:08 AM    HCT 39.2 02/01/2018 06:08 AM    PLATELET 285 38/18/8485 06:08 AM    MCV 91.2 02/01/2018 06:08 AM     Lab Results   Component Value Date/Time    Sodium 140 02/01/2018 06:08 AM    Potassium 4.0 02/01/2018 06:08 AM    Chloride 107 02/01/2018 06:08 AM    CO2 23 02/01/2018 06:08 AM    Anion gap 10 02/01/2018 06:08 AM    Glucose 111 (H) 02/01/2018 06:08 AM    BUN 21 (H) 02/01/2018 06:08 AM    Creatinine 1.28 (H) 02/01/2018 06:08 AM    BUN/Creatinine ratio 16 02/01/2018 06:08 AM    GFR est AA 51 (L) 02/01/2018 06:08 AM    GFR est non-AA 42 (L) 02/01/2018 06:08 AM    Calcium 8.2 (L) 02/01/2018 06:08 AM         IMPRESSION/PLAN:  Luz Power is a 61 y.o. female with a working diagnosis of stage IA uterine carcinosarcoma. I reviewed with Luz Romeo her medical records, physical exam, and review of symptoms. She was recommended combination chemotherapy with Taxol/Carboplatin. She presents for IV port placement. She was counseled on the risks, benefits, indications, and alternatives of surgery.   Her questions were answered and she wishes to proceed as planned.       Signed By: Ochoa Duran., MD     3/16/2018/7:30 AM

## 2018-03-16 NOTE — ANESTHESIA POSTPROCEDURE EVALUATION
Post-Anesthesia Evaluation and Assessment    Patient: Anel Castano MRN: [id number]  SSN: [id number]    YOB: 1955  Age: 61 y.o. Sex: female       Cardiovascular Function/Vital Signs  Visit Vitals    BP 99/59    Pulse 70    Temp 36.8 °C (98.2 °F)    Resp 13    Ht 5' 8.5\" (1.74 m)    Wt 113.4 kg (250 lb)    SpO2 97%    BMI 37.46 kg/m2       Patient is status post general anesthesia for Procedure(s):  PORT A CATH INSERTION. Nausea/Vomiting: None    Postoperative hydration reviewed and adequate. Pain:  Pain Scale 1: Numeric (0 - 10) (03/16/18 1255)  Pain Intensity 1: 0 (03/16/18 1255)   Managed    Neurological Status:   Neuro (WDL): Within Defined Limits (03/16/18 1233)   At baseline    Mental Status and Level of Consciousness: Arousable    Pulmonary Status:   O2 Device: Room air (03/16/18 1255)   Adequate oxygenation and airway patent    Complications related to anesthesia: None    Post-anesthesia assessment completed.  No concerns    Signed By: Izzy Delacruz DO     March 16, 2018

## 2018-03-16 NOTE — DISCHARGE INSTRUCTIONS
Instructions Following Ambulatory Surgery    Activity  · As tolerated and directed by your doctor  · Bathe or shower as directed by your doctor    Diet  · Light diet for the first day  · Advance to regular diet on second day, unless your doctor orders otherwise  · If nausea and vomiting continues, call your doctor    Pain  · Take pain medication as directed by your doctor  ·  Call your doctor if pain is NOT relieved by medication  · DO NOT take aspirin or blood thinners until directed by your doctor    Dressing Care: Leave dressing in place    Follow-Up Phone Calls  · Will be made nursing staff  · If you have any problems, call your doctor as needed    Call your doctor if  · Excessive bleeding that does not stop after holding mild pressure over the area  · Temperature of 101 degrees F or above  · Redness,excessive swelling or bruising, and/or green or yellow, smelly discharge from incision    After Anesthesia  · For the first 24 hours: DO NOT Drive, Drink alcoholic beverages, or Make important decisions  · Be aware of dizziness following anesthesia and while taking pain medication         Implanted Port Care for Chemotherapy: Care Instructions  Your Care Instructions  An implanted port is a device placed, in most cases, under the skin of your chest below your collarbone. It is made of plastic, stainless steel, or titanium. The port is about the size of a quarter, but thicker. A thin, flexible tube called a catheter runs from the port into a large vein. A membrane (septum) similar to a pencil eraser is in the center of the port. A nurse uses a needle to put chemotherapy or other medicine and fluids through the septum into a blood vessel. A health professional also can take blood for tests through the port. An implanted port can be used for months. A special needle (called a Summers needle) may stay in the port for a short time. The port and catheter need regular care to make sure they do not get blocked.   Tell your doctor if you take aspirin or some other blood thinner. These medicines can increase the chance of bleeding inside your body. Follow-up care is a key part of your treatment and safety. Be sure to make and go to all appointments, and call your doctor if you are having problems. It's also a good idea to know your test results and keep a list of the medicines you take. How can you care for yourself at home? · You will probably need to take 1 day off from work and will be able return to normal activities shortly after. This depends on the type of work you do, why you have the port, and how you feel. · You probably will be able to bathe and swim. But you may need to avoid some activities if a Summers needle is left in the port. Talk to your doctor about any limits on your activity. · Some clothes may rub the skin over the port. Do not wear a bra or suspenders that irritate your skin near the port. Do not have your blood pressure taken on the arm with the port. · You will get a medical alert card with information about your port. Carry this with you. It will tell health care workers you have a port in case you need emergency care. · Your port will need regular flushing to keep it open. A nurse or other health professional will do this for you. When should you call for help? Call your doctor now or seek immediate medical care if:  ? · You have signs of infection, such as:  ¨ Increased pain, swelling, warmth, or redness near the port. ¨ Red streaks leading from the port. ¨ Pus draining from the port. ¨ A fever. ? · You have pain or swelling in your neck or arm. ? · You have trouble breathing. ? Watch closely for changes in your health, and be sure to contact your doctor if:  ? · You have any problems with your port. Where can you learn more? Go to http://mario-neil.info/.   Enter [phone] in the search box to learn more about \"Implanted [employer] [facility] for [facility]: Care Instructions. \"  Current as of: March 20, 2017  Content Version: 11.4  © 6991-8489 Healthwise, [facility]. Care instructions adapted under license by "[employer]" (which disclaims liability or warranty for this information). If you have questions about a medical condition or this instruction, always ask your healthcare professional. [place] [address] any warranty or liability for your use of this information.

## 2018-03-17 LAB
ALBUMIN SERPL-MCNC: 4.1 G/DL (ref 3.6–4.8)
ALBUMIN/GLOB SERPL: 1.3 {RATIO} (ref 1.2–2.2)
ALP SERPL-CCNC: 108 IU/L (ref 39–117)
ALT SERPL-CCNC: 19 IU/L (ref 0–32)
AST SERPL-CCNC: 14 IU/L (ref 0–40)
BASOPHILS # BLD AUTO: 0 X10E3/UL (ref 0–0.2)
BASOPHILS NFR BLD AUTO: 0 %
BILIRUB SERPL-MCNC: 0.4 MG/DL (ref 0–1.2)
BUN SERPL-MCNC: 29 MG/DL (ref 8–27)
BUN/CREAT SERPL: 24 (ref 12–28)
CALCIUM SERPL-MCNC: 9.4 MG/DL (ref 8.7–10.3)
CANCER AG125 SERPL-ACNC: 45.6 U/ML (ref 0–38.1)
CHLORIDE SERPL-SCNC: 106 MMOL/L (ref 96–106)
CO2 SERPL-SCNC: 22 MMOL/L (ref 18–29)
CREAT SERPL-MCNC: 1.2 MG/DL (ref 0.57–1)
EOSINOPHIL # BLD AUTO: 0.8 X10E3/UL (ref 0–0.4)
EOSINOPHIL NFR BLD AUTO: 5 %
ERYTHROCYTE [DISTWIDTH] IN BLOOD BY AUTOMATED COUNT: 13.6 % (ref 12.3–15.4)
GFR SERPLBLD CREATININE-BSD FMLA CKD-EPI: 48 ML/MIN/1.73
GFR SERPLBLD CREATININE-BSD FMLA CKD-EPI: 56 ML/MIN/1.73
GLOBULIN SER CALC-MCNC: 3.2 G/DL (ref 1.5–4.5)
GLUCOSE SERPL-MCNC: 97 MG/DL (ref 65–99)
HCT VFR BLD AUTO: 40.6 % (ref 34–46.6)
HGB BLD-MCNC: 13.3 G/DL (ref 11.1–15.9)
IMM GRANULOCYTES # BLD: 0 X10E3/UL (ref 0–0.1)
IMM GRANULOCYTES NFR BLD: 0 %
LYMPHOCYTES # BLD AUTO: 2.6 X10E3/UL (ref 0.7–3.1)
LYMPHOCYTES NFR BLD AUTO: 19 %
MAGNESIUM SERPL-MCNC: 2.1 MG/DL (ref 1.6–2.3)
MCH RBC QN AUTO: 30 PG (ref 26.6–33)
MCHC RBC AUTO-ENTMCNC: 32.8 G/DL (ref 31.5–35.7)
MCV RBC AUTO: 91 FL (ref 79–97)
MONOCYTES # BLD AUTO: 1 X10E3/UL (ref 0.1–0.9)
MONOCYTES NFR BLD AUTO: 7 %
NEUTROPHILS # BLD AUTO: 9.7 X10E3/UL (ref 1.4–7)
NEUTROPHILS NFR BLD AUTO: 69 %
PLATELET # BLD AUTO: 435 X10E3/UL (ref 150–379)
POTASSIUM SERPL-SCNC: 4.6 MMOL/L (ref 3.5–5.2)
PROT SERPL-MCNC: 7.3 G/DL (ref 6–8.5)
RBC # BLD AUTO: 4.44 X10E6/UL (ref 3.77–5.28)
SODIUM SERPL-SCNC: 144 MMOL/L (ref 134–144)
WBC # BLD AUTO: 14.2 X10E3/UL (ref 3.4–10.8)

## 2018-03-19 ENCOUNTER — TELEPHONE (OUTPATIENT)
Dept: GYNECOLOGY | Age: 63
End: 2018-03-19

## 2018-03-19 NOTE — TELEPHONE ENCOUNTER
Pt calling to request change of location for 3/21/18 C1 chemotherapy to [facility] as her bus transportation does not go to [facility] and a cab would cost 100.00. I have R/S to [facility] at 8am on 3/21/18 with Noelle. Pt confirms this is a good appointment place and time.

## 2018-03-20 RX ORDER — GRANISETRON HYDROCHLORIDE 1 MG/ML
1 INJECTION INTRAVENOUS ONCE
Status: COMPLETED | OUTPATIENT
Start: 2018-03-21 | End: 2018-03-21

## 2018-03-20 RX ORDER — DIPHENHYDRAMINE HYDROCHLORIDE 50 MG/ML
50 INJECTION, SOLUTION INTRAMUSCULAR; INTRAVENOUS ONCE
Status: COMPLETED | OUTPATIENT
Start: 2018-03-21 | End: 2018-03-21

## 2018-03-21 ENCOUNTER — HOSPITAL ENCOUNTER (OUTPATIENT)
Dept: INFUSION THERAPY | Age: 63
Discharge: HOME | End: 2018-03-21
Payer: MEDICARE

## 2018-03-21 VITALS
BODY MASS INDEX: 37.8 KG/M2 | WEIGHT: 255.2 LBS | RESPIRATION RATE: 18 BRPM | OXYGEN SATURATION: 97 % | SYSTOLIC BLOOD PRESSURE: 145 MMHG | DIASTOLIC BLOOD PRESSURE: 90 MMHG | TEMPERATURE: 99.5 F | HEIGHT: 69 IN | HEART RATE: 91 BPM

## 2018-03-21 PROCEDURE — 96413 CHEMO IV INFUSION 1 HR: CPT

## 2018-03-21 PROCEDURE — 74011250636 HC RX REV CODE- 250/636: Performed by: OBSTETRICS & GYNECOLOGY

## 2018-03-21 PROCEDURE — 74011000258 HC RX REV CODE- 258: Performed by: OBSTETRICS & GYNECOLOGY

## 2018-03-21 PROCEDURE — 74011000250 HC RX REV CODE- 250: Performed by: OBSTETRICS & GYNECOLOGY

## 2018-03-21 PROCEDURE — 96367 TX/PROPH/DG ADDL SEQ IV INF: CPT

## 2018-03-21 PROCEDURE — 96415 CHEMO IV INFUSION ADDL HR: CPT

## 2018-03-21 PROCEDURE — 96417 CHEMO IV INFUS EACH ADDL SEQ: CPT

## 2018-03-21 PROCEDURE — 96375 TX/PRO/DX INJ NEW DRUG ADDON: CPT

## 2018-03-21 PROCEDURE — 77030012965 HC NDL HUBR BBMI -A

## 2018-03-21 RX ORDER — SODIUM CHLORIDE 0.9 % (FLUSH) 0.9 %
10-40 SYRINGE (ML) INJECTION AS NEEDED
Status: ACTIVE | OUTPATIENT
Start: 2018-03-21 | End: 2018-03-22

## 2018-03-21 RX ORDER — HEPARIN 100 UNIT/ML
500 SYRINGE INTRAVENOUS AS NEEDED
Status: ACTIVE | OUTPATIENT
Start: 2018-03-21 | End: 2018-03-22

## 2018-03-21 RX ADMIN — SODIUM CHLORIDE 150 MG: 900 INJECTION, SOLUTION INTRAVENOUS at 10:11

## 2018-03-21 RX ADMIN — GRANISETRON HYDROCHLORIDE 1 MG: 1 INJECTION INTRAVENOUS at 09:40

## 2018-03-21 RX ADMIN — DEXAMETHASONE SODIUM PHOSPHATE 12 MG: 4 INJECTION, SOLUTION INTRAMUSCULAR; INTRAVENOUS at 09:44

## 2018-03-21 RX ADMIN — CARBOPLATIN 664 MG: 10 INJECTION, SOLUTION INTRAVENOUS at 14:41

## 2018-03-21 RX ADMIN — SODIUM CHLORIDE, PRESERVATIVE FREE 500 UNITS: 5 INJECTION INTRAVENOUS at 16:00

## 2018-03-21 RX ADMIN — SODIUM CHLORIDE 20 MG: 9 INJECTION INTRAMUSCULAR; INTRAVENOUS; SUBCUTANEOUS at 09:37

## 2018-03-21 RX ADMIN — DIPHENHYDRAMINE HYDROCHLORIDE 50 MG: 50 INJECTION INTRAMUSCULAR; INTRAVENOUS at 09:42

## 2018-03-21 RX ADMIN — Medication 10 ML: at 08:15

## 2018-03-21 RX ADMIN — PACLITAXEL 410 MG: 6 INJECTION, SOLUTION INTRAVENOUS at 11:11

## 2018-03-21 RX ADMIN — Medication 10 ML: at 16:00

## 2018-03-21 NOTE — PROGRESS NOTES
0815 Pt admit to [facility] for C1 Paclitaxel/Carboplatin ambulatory in stable condition. Assessment completed. No new concerns voiced. Port accessed and flushed with positive blood return. [facility] Visit Vitals    /90    Pulse 94    Temp 99.5 °F (37.5 °C)    Resp 22    Ht 5' 8.5\" (1.74 m)    Wt 115.8 kg (255 lb 3.2 oz)    LMP 01/09/2011    SpO2 97%    Breastfeeding No    BMI 38.23 kg/m2       Medications:  Normal Saline KVO  Pepcid IV Push  Benadryl IV Push  Kytril IV Push  Decadron IVPB  Emend IVPB  Paclitaxel  Carboplatin  Heparin Flush    1600 Pt tolerated treatment well. Port maintained positive blood return throughout treatment, flushed with positive blood return at conclusion, port Heparinized and de-accessed per protocol. D/c home ambulatory in no distress. Pt aware of next [facility] appointment scheduled for 4/11/18.

## 2018-03-26 ENCOUNTER — TELEPHONE (OUTPATIENT)
Dept: GYNECOLOGY | Age: 63
End: 2018-03-26

## 2018-04-06 DIAGNOSIS — C55 UTERINE CARCINOSARCOMA (HCC): Primary | ICD-10-CM

## 2018-04-09 ENCOUNTER — TELEPHONE (OUTPATIENT)
Dept: GYNECOLOGY | Age: 63
End: 2018-04-09

## 2018-04-10 LAB
ALBUMIN SERPL-MCNC: 4.2 G/DL (ref 3.6–4.8)
ALBUMIN/GLOB SERPL: 1.4 {RATIO} (ref 1.2–2.2)
ALP SERPL-CCNC: 123 IU/L (ref 39–117)
ALT SERPL-CCNC: 15 IU/L (ref 0–32)
AST SERPL-CCNC: 17 IU/L (ref 0–40)
BASOPHILS # BLD AUTO: 0 X10E3/UL (ref 0–0.2)
BASOPHILS NFR BLD AUTO: 0 %
BILIRUB SERPL-MCNC: 0.3 MG/DL (ref 0–1.2)
BUN SERPL-MCNC: 21 MG/DL (ref 8–27)
BUN/CREAT SERPL: 17 (ref 12–28)
CALCIUM SERPL-MCNC: 9.6 MG/DL (ref 8.7–10.3)
CANCER AG125 SERPL-ACNC: 38.3 U/ML (ref 0–38.1)
CHLORIDE SERPL-SCNC: 102 MMOL/L (ref 96–106)
CO2 SERPL-SCNC: 22 MMOL/L (ref 18–29)
CREAT SERPL-MCNC: 1.27 MG/DL (ref 0.57–1)
EOSINOPHIL # BLD AUTO: 0 X10E3/UL (ref 0–0.4)
EOSINOPHIL NFR BLD AUTO: 0 %
ERYTHROCYTE [DISTWIDTH] IN BLOOD BY AUTOMATED COUNT: 15.2 % (ref 12.3–15.4)
GFR SERPLBLD CREATININE-BSD FMLA CKD-EPI: 45 ML/MIN/1.73
GFR SERPLBLD CREATININE-BSD FMLA CKD-EPI: 52 ML/MIN/1.73
GLOBULIN SER CALC-MCNC: 3.1 G/DL (ref 1.5–4.5)
GLUCOSE SERPL-MCNC: 92 MG/DL (ref 65–99)
HCT VFR BLD AUTO: 36.5 % (ref 34–46.6)
HGB BLD-MCNC: 11.8 G/DL (ref 11.1–15.9)
IMM GRANULOCYTES # BLD: 0 X10E3/UL (ref 0–0.1)
IMM GRANULOCYTES NFR BLD: 0 %
LYMPHOCYTES # BLD AUTO: 2.5 X10E3/UL (ref 0.7–3.1)
LYMPHOCYTES NFR BLD AUTO: 22 %
MAGNESIUM SERPL-MCNC: 1.9 MG/DL (ref 1.6–2.3)
MCH RBC QN AUTO: 29.5 PG (ref 26.6–33)
MCHC RBC AUTO-ENTMCNC: 32.3 G/DL (ref 31.5–35.7)
MCV RBC AUTO: 91 FL (ref 79–97)
MONOCYTES # BLD AUTO: 0.5 X10E3/UL (ref 0.1–0.9)
MONOCYTES NFR BLD AUTO: 4 %
NEUTROPHILS # BLD AUTO: 8.3 X10E3/UL (ref 1.4–7)
NEUTROPHILS NFR BLD AUTO: 74 %
PLATELET # BLD AUTO: 348 X10E3/UL (ref 150–379)
POTASSIUM SERPL-SCNC: 3.6 MMOL/L (ref 3.5–5.2)
PROT SERPL-MCNC: 7.3 G/DL (ref 6–8.5)
RBC # BLD AUTO: 4 X10E6/UL (ref 3.77–5.28)
SODIUM SERPL-SCNC: 142 MMOL/L (ref 134–144)
WBC # BLD AUTO: 11.4 X10E3/UL (ref 3.4–10.8)

## 2018-04-10 RX ORDER — GRANISETRON HYDROCHLORIDE 1 MG/ML
1 INJECTION INTRAVENOUS ONCE
Status: COMPLETED | OUTPATIENT
Start: 2018-04-11 | End: 2018-04-11

## 2018-04-10 RX ORDER — DIPHENHYDRAMINE HYDROCHLORIDE 50 MG/ML
50 INJECTION, SOLUTION INTRAMUSCULAR; INTRAVENOUS ONCE
Status: COMPLETED | OUTPATIENT
Start: 2018-04-11 | End: 2018-04-11

## 2018-04-11 ENCOUNTER — HOSPITAL ENCOUNTER (OUTPATIENT)
Dept: INFUSION THERAPY | Age: 63
Discharge: HOME | End: 2018-04-11
Payer: MEDICARE

## 2018-04-11 VITALS
TEMPERATURE: 98.6 F | HEART RATE: 71 BPM | HEIGHT: 69 IN | BODY MASS INDEX: 36.73 KG/M2 | DIASTOLIC BLOOD PRESSURE: 81 MMHG | SYSTOLIC BLOOD PRESSURE: 147 MMHG | WEIGHT: 248 LBS | RESPIRATION RATE: 18 BRPM

## 2018-04-11 LAB
EST. AVERAGE GLUCOSE BLD GHB EST-MCNC: 117 MG/DL
HBA1C MFR BLD: 5.7 % (ref 4.2–6.3)

## 2018-04-11 PROCEDURE — 83036 HEMOGLOBIN GLYCOSYLATED A1C: CPT | Performed by: NURSE PRACTITIONER

## 2018-04-11 PROCEDURE — 96367 TX/PROPH/DG ADDL SEQ IV INF: CPT

## 2018-04-11 PROCEDURE — 74011000258 HC RX REV CODE- 258: Performed by: OBSTETRICS & GYNECOLOGY

## 2018-04-11 PROCEDURE — 74011250636 HC RX REV CODE- 250/636: Performed by: OBSTETRICS & GYNECOLOGY

## 2018-04-11 PROCEDURE — 77030012965 HC NDL HUBR BBMI -A

## 2018-04-11 PROCEDURE — 96415 CHEMO IV INFUSION ADDL HR: CPT

## 2018-04-11 PROCEDURE — 36415 COLL VENOUS BLD VENIPUNCTURE: CPT | Performed by: NURSE PRACTITIONER

## 2018-04-11 PROCEDURE — 74011000250 HC RX REV CODE- 250: Performed by: OBSTETRICS & GYNECOLOGY

## 2018-04-11 PROCEDURE — 74011250636 HC RX REV CODE- 250/636: Performed by: NURSE PRACTITIONER

## 2018-04-11 PROCEDURE — 96417 CHEMO IV INFUS EACH ADDL SEQ: CPT

## 2018-04-11 PROCEDURE — 96413 CHEMO IV INFUSION 1 HR: CPT

## 2018-04-11 PROCEDURE — 96375 TX/PRO/DX INJ NEW DRUG ADDON: CPT

## 2018-04-11 RX ORDER — SODIUM CHLORIDE 0.9 % (FLUSH) 0.9 %
10-40 SYRINGE (ML) INJECTION AS NEEDED
Status: DISCONTINUED | OUTPATIENT
Start: 2018-04-11 | End: 2018-04-15 | Stop reason: HOSPADM

## 2018-04-11 RX ORDER — HEPARIN 100 UNIT/ML
500 SYRINGE INTRAVENOUS AS NEEDED
Status: ACTIVE | OUTPATIENT
Start: 2018-04-11 | End: 2018-04-12

## 2018-04-11 RX ORDER — SODIUM CHLORIDE 9 MG/ML
10 INJECTION INTRAMUSCULAR; INTRAVENOUS; SUBCUTANEOUS AS NEEDED
Status: ACTIVE | OUTPATIENT
Start: 2018-04-11 | End: 2018-04-12

## 2018-04-11 RX ADMIN — DIPHENHYDRAMINE HYDROCHLORIDE 50 MG: 50 INJECTION INTRAMUSCULAR; INTRAVENOUS at 12:24

## 2018-04-11 RX ADMIN — Medication 10 ML: at 17:40

## 2018-04-11 RX ADMIN — SODIUM CHLORIDE 10 ML: 9 INJECTION INTRAMUSCULAR; INTRAVENOUS; SUBCUTANEOUS at 10:30

## 2018-04-11 RX ADMIN — SODIUM CHLORIDE, PRESERVATIVE FREE 500 UNITS: 5 INJECTION INTRAVENOUS at 17:40

## 2018-04-11 RX ADMIN — DEXAMETHASONE SODIUM PHOSPHATE 12 MG: 4 INJECTION, SOLUTION INTRA-ARTICULAR; INTRALESIONAL; INTRAMUSCULAR; INTRAVENOUS; SOFT TISSUE at 12:26

## 2018-04-11 RX ADMIN — GRANISETRON HYDROCHLORIDE 1 MG: 1 INJECTION INTRAVENOUS at 12:23

## 2018-04-11 RX ADMIN — SODIUM CHLORIDE 1000 ML: 900 INJECTION, SOLUTION INTRAVENOUS at 11:16

## 2018-04-11 RX ADMIN — SODIUM CHLORIDE 150 MG: 900 INJECTION, SOLUTION INTRAVENOUS at 12:48

## 2018-04-11 RX ADMIN — PACLITAXEL 410 MG: 6 INJECTION, SOLUTION INTRAVENOUS at 13:17

## 2018-04-11 RX ADMIN — SODIUM CHLORIDE 20 MG: 9 INJECTION INTRAMUSCULAR; INTRAVENOUS; SUBCUTANEOUS at 12:20

## 2018-04-11 RX ADMIN — CARBOPLATIN 664 MG: 10 INJECTION, SOLUTION INTRAVENOUS at 16:26

## 2018-04-11 RX ADMIN — SODIUM CHLORIDE 10 ML: 9 INJECTION INTRAMUSCULAR; INTRAVENOUS; SUBCUTANEOUS at 10:40

## 2018-04-11 NOTE — PROGRESS NOTES
Sara Lerma. Mode, MARISA      Date of visit: 4/11/2018   HPI:  Luz Newell is a 61 y.o.  postmenopausal female who was referred by Dr. Fred Lombardo. after pt presented with postmenopausal  Bleeding. A pelvic US revealed a thickend endometrium and her pap smear was AGCUS. She then underwent a colposcopy with cervical bx, ECC and EMB. The cervical biopsy and ECC were negative.  The EMB revealed carcinosarcoma of the endometrium. CT of chest/abd/pelvis showed uterine disease, no other visible evidence of metastatic disease. She underwent robotic hysterectomy with staging on January 31, 2018. Pathology revealed LVSI but her sentinel nodes were negative. Pelvic washings also negative. She has a diagnosis of stage IA uterine carcinosarcoma and Dr. Jihan Bradley recommended adjuvant chemotherapy with Taxol/Carboplatin due to her risk of recurrence    Onc History:  1/31/2018: Robotically assisted TLH/BSO/Left [place] PLND/Right pelvic node sampling  3/16/2018: Placement of port  3/21/2018: Began planned 6 cycles of Carbo/Taxol Q 21 days    Subjective:   Pt presents today for her second cycle. She says she feels she has tolerated her first cycle \"pretty well\". She has had difficulty with both her phone and transportation and says this is why she missed her pre-chemo apt this week. Says phone is being fixed today. Current Outpatient Prescriptions   Medication Sig    oxyCODONE-acetaminophen (PERCOCET) 5-325 mg per tablet Take 1 Tab by mouth every four (4) hours as needed for Pain. Max Daily Amount: 6 Tabs.     terconazole (TERAZOL 3) 0.8 % vaginal cream Apply topically to the vulva twice daily    dexamethasone (DECADRON) 4 mg tablet Take 2 tablets with breakfast the day before chemo and for 2 days after chemo    lidocaine-prilocaine (EMLA) topical cream Apply small amount over port area one hour before chemo treatment and cover with a Band-Aid    ondansetron (ZOFRAN ODT) 4 mg disintegrating tablet Take 1 Tab by mouth every eight (8) hours as needed for Nausea. Indications: CANCER CHEMOTHERAPY-INDUCED NAUSEA AND VOMITING    polyethylene glycol (MIRALAX) 17 gram/dose powder Take 17 g by mouth daily.  amLODIPine (NORVASC) 10 mg tablet Take 10 mg by mouth daily.  Calcium-Cholecalciferol, D3, (CALCIUM 600 WITH VITAMIN D3) 600 mg(1,500mg) -400 unit chew Take 1 Tab by mouth daily.  meclizine (ANTIVERT) 25 mg tablet Take 25 mg by mouth as needed.  traMADol (ULTRAM) 50 mg tablet Take 50 mg by mouth nightly.  TRAMADOL HCL (TRAMADOL PO) Take 100 mg by mouth every morning.  aspirin (ASPIRIN) 325 mg tablet Take 325 mg by mouth daily.  atorvastatin (LIPITOR) 10 mg tablet Take 10 mg by mouth daily.  MULTIVITS W-FE,OTHER MIN/LUT (CENTRUM SILVER ULTRA WOMEN'S PO) Take  by mouth.  clopidogrel (PLAVIX) 75 mg tablet Take 75 mg by mouth daily.  gabapentin (NEURONTIN) 300 mg capsule Take 300 mg by mouth.  metoprolol (LOPRESSOR) 50 mg tablet Take 50 mg by mouth two (2) times a day.  oxybutynin (DITROPAN) 5 mg tablet Take 15 mg by mouth two (2) times a day. Current Facility-Administered Medications   Medication Dose Route Frequency    sodium chloride (NS) flush 10-40 mL  10-40 mL IntraVENous PRN    sodium chloride 0.9% injection 10 mL  10 mL IntraVENous PRN    heparin (porcine) pf 500 Units  500 Units IntraVENous PRN    CARBOplatin (PARAPLATIN) 664 mg in 0.9% sodium chloride 250 mL, overfill volume 25 mL chemo infusion  664 mg IntraVENous ONCE        Review of Systems:  General: Denies wt loss. Says she has a lot of fatigue since her stroke. HEENT: Denies visual changes, dysphagia or headache  Resp: Denies SOB, PRIETO, wheezing or cough  CV: Denies CP, palpitations  GI/:Occasional constipation after last treatment.   Denies N/V, bloating, diarrhea, or dysuria  MuskSkel:Acknowledges persistent difficulty with right sided weakness, drop foot and hand grasp. Says she continues to work with her physical therapy. Denies muscle ache or joint pain. Neuro: Denies neuropathy, dizzyness or syncope  Psych: Denies depression or feelings of saddness    Objective:     Patient Vitals for the past 8 hrs:   Height Weight   04/11/18 1016 5' 8.5\" (1.74 m) 248 lb (112.5 kg)       Physical Exam:  General: A&O X3 in NAD  HEENT: Sclera anicteric, Mucosa pink, moist without lesions. Pupils equal and reactive to light  Neck: No JVD or cervical adenopathy appreciated. Port Site: without redness, swelling or tenderness  Heart: Regular without M/R/G  Lungs: CTA Bilat without wheezing or rales. No cough noted  Abd: Soft, obese, NT/ND with + BS throughout  Ext: Without edema and + pedal pulses bilat. Right lower ext with leg brace in place. Neuro: grossly intact        Assessment:     Patient Active Problem List   Diagnosis Code    Hypertension I10    Chronic lung disease J98.4    Stroke ([facility] I63.9    Uterine carcinosarcoma ([facility] C55    Obesity (BMI 30.0-34. 9) E66.9    Elevated serum creatinine R79.89         Plan:   Proceed with C2  She will continue  Her Plavix, and antihypertensives and follow with PCP.    Zofran PRN available to pt  Miralax PRN  Hydration encouraged  Encouraged to call for any concerns or questions  Luis Rosado NP

## 2018-04-11 NOTE — PROGRESS NOTES
56 Pt admit to [facility] for C2 Paclitaxel/Carboplatin ambulatory in stable condition. Assessment completed. No new concerns voiced. Port accessed and flushed with positive blood return. Normal Saline started at [facility]. Visit Vitals    /81    Pulse 71    Temp 98.6 °F (37 °C)    Resp 18    Ht 5' 8.5\" (1.74 m)    Wt 112.5 kg (248 lb)    LMP 01/09/2011    BMI 37.16 kg/m2       Medications:  Normal Saline KVo  Normal Saline 1000ml bolus  Benadryl IV Push  Pepcid IV Push  Decadron IVPB  Emend IVPB  Paclitaxel  Carboplatin  Heparin Flush    1740 Pt tolerated treatment well. Port maintained positive blood return throughout treatment, flushed with positive blood return at conclusion and port heparinized and de-accessed per protocol. . D/c home ambulatory in no distress. Pt aware of next [facility] appointment scheduled for 5/2/18.     Recent Results (from the past 12 hour(s))   HEMOGLOBIN A1C WITH EAG    Collection Time: 04/11/18 11:17 AM   Result Value Ref Range    Hemoglobin A1c 5.7 4.2 - 6.3 %    Est. average glucose 117 mg/dL

## 2018-04-20 ENCOUNTER — HOSPITAL ENCOUNTER (OUTPATIENT)
Dept: MAMMOGRAPHY | Age: 63
Discharge: HOME | End: 2018-04-20
Attending: FAMILY MEDICINE
Payer: MEDICARE

## 2018-04-20 DIAGNOSIS — Z12.39 SCREENING BREAST EXAMINATION: ICD-10-CM

## 2018-04-20 PROCEDURE — 77067 SCR MAMMO BI INCL CAD: CPT

## 2018-04-25 ENCOUNTER — TELEPHONE (OUTPATIENT)
Dept: GYNECOLOGY | Age: 63
End: 2018-04-25

## 2018-04-25 NOTE — TELEPHONE ENCOUNTER
Called pt and left message on am to remind her of appt with Dr. vYette Rios and lab work tomorrow at 11:30am.

## 2018-04-26 ENCOUNTER — HOSPITAL ENCOUNTER (OUTPATIENT)
Dept: INFUSION THERAPY | Age: 63
Discharge: HOME | End: 2018-04-26
Payer: MEDICARE

## 2018-04-26 ENCOUNTER — OFFICE VISIT (OUTPATIENT)
Dept: GYNECOLOGY | Age: 63
End: 2018-04-26

## 2018-04-26 VITALS
DIASTOLIC BLOOD PRESSURE: 74 MMHG | WEIGHT: 245 LBS | BODY MASS INDEX: 36.29 KG/M2 | HEART RATE: 81 BPM | SYSTOLIC BLOOD PRESSURE: 120 MMHG | HEIGHT: 69 IN

## 2018-04-26 DIAGNOSIS — C55 UTERINE CARCINOSARCOMA (HCC): Primary | ICD-10-CM

## 2018-04-26 PROBLEM — E66.01 SEVERE OBESITY (BMI 35.0-39.9) WITH COMORBIDITY (HCC): Status: ACTIVE | Noted: 2018-04-26

## 2018-04-26 LAB
ALBUMIN SERPL-MCNC: 3.2 G/DL (ref 3.5–5)
ALBUMIN/GLOB SERPL: 0.8 {RATIO} (ref 1.1–2.2)
ALP SERPL-CCNC: 106 U/L (ref 45–117)
ALT SERPL-CCNC: 20 U/L (ref 12–78)
ANION GAP SERPL CALC-SCNC: 9 MMOL/L (ref 5–15)
AST SERPL-CCNC: 15 U/L (ref 15–37)
BASOPHILS # BLD: 0 K/UL (ref 0–0.1)
BASOPHILS NFR BLD: 0 % (ref 0–1)
BILIRUB SERPL-MCNC: 0.5 MG/DL (ref 0.2–1)
BUN SERPL-MCNC: 21 MG/DL (ref 6–20)
BUN/CREAT SERPL: 18 (ref 12–20)
CALCIUM SERPL-MCNC: 9.3 MG/DL (ref 8.5–10.1)
CANCER AG125 SERPL-ACNC: 19 U/ML (ref 1.5–35)
CHLORIDE SERPL-SCNC: 107 MMOL/L (ref 97–108)
CO2 SERPL-SCNC: 25 MMOL/L (ref 21–32)
CREAT SERPL-MCNC: 1.14 MG/DL (ref 0.55–1.02)
DIFFERENTIAL METHOD BLD: ABNORMAL
EOSINOPHIL # BLD: 0.1 K/UL (ref 0–0.4)
EOSINOPHIL NFR BLD: 3 % (ref 0–7)
ERYTHROCYTE [DISTWIDTH] IN BLOOD BY AUTOMATED COUNT: 17 % (ref 11.5–14.5)
GLOBULIN SER CALC-MCNC: 3.9 G/DL (ref 2–4)
GLUCOSE SERPL-MCNC: 102 MG/DL (ref 65–100)
HCT VFR BLD AUTO: 31.8 % (ref 35–47)
HGB BLD-MCNC: 10.2 G/DL (ref 11.5–16)
IMM GRANULOCYTES # BLD: 0 K/UL (ref 0–0.04)
IMM GRANULOCYTES NFR BLD AUTO: 0 % (ref 0–0.5)
LYMPHOCYTES # BLD: 1.5 K/UL (ref 0.8–3.5)
LYMPHOCYTES NFR BLD: 41 % (ref 12–49)
MAGNESIUM SERPL-MCNC: 2.2 MG/DL (ref 1.6–2.4)
MCH RBC QN AUTO: 30.5 PG (ref 26–34)
MCHC RBC AUTO-ENTMCNC: 32.1 G/DL (ref 30–36.5)
MCV RBC AUTO: 95.2 FL (ref 80–99)
MONOCYTES # BLD: 0.4 K/UL (ref 0–1)
MONOCYTES NFR BLD: 12 % (ref 5–13)
NEUTS SEG # BLD: 1.7 K/UL (ref 1.8–8)
NEUTS SEG NFR BLD: 44 % (ref 32–75)
NRBC # BLD: 0.05 K/UL (ref 0–0.01)
NRBC BLD-RTO: 1.4 PER 100 WBC
PLATELET # BLD AUTO: 115 K/UL (ref 150–400)
PMV BLD AUTO: 9.7 FL (ref 8.9–12.9)
POTASSIUM SERPL-SCNC: 4.3 MMOL/L (ref 3.5–5.1)
PROT SERPL-MCNC: 7.1 G/DL (ref 6.4–8.2)
RBC # BLD AUTO: 3.34 M/UL (ref 3.8–5.2)
RBC MORPH BLD: ABNORMAL
RBC MORPH BLD: ABNORMAL
SODIUM SERPL-SCNC: 141 MMOL/L (ref 136–145)
WBC # BLD AUTO: 3.7 K/UL (ref 3.6–11)

## 2018-04-26 PROCEDURE — 83735 ASSAY OF MAGNESIUM: CPT | Performed by: OBSTETRICS & GYNECOLOGY

## 2018-04-26 PROCEDURE — 36415 COLL VENOUS BLD VENIPUNCTURE: CPT | Performed by: OBSTETRICS & GYNECOLOGY

## 2018-04-26 PROCEDURE — 80053 COMPREHEN METABOLIC PANEL: CPT | Performed by: OBSTETRICS & GYNECOLOGY

## 2018-04-26 PROCEDURE — 85025 COMPLETE CBC W/AUTO DIFF WBC: CPT | Performed by: OBSTETRICS & GYNECOLOGY

## 2018-04-26 PROCEDURE — 86304 IMMUNOASSAY TUMOR CA 125: CPT | Performed by: OBSTETRICS & GYNECOLOGY

## 2018-04-26 RX ORDER — SODIUM CHLORIDE 9 MG/ML
10 INJECTION INTRAMUSCULAR; INTRAVENOUS; SUBCUTANEOUS AS NEEDED
Status: ACTIVE | OUTPATIENT
Start: 2018-04-26 | End: 2018-04-27

## 2018-04-26 RX ORDER — SODIUM CHLORIDE 0.9 % (FLUSH) 0.9 %
5-10 SYRINGE (ML) INJECTION AS NEEDED
Status: ACTIVE | OUTPATIENT
Start: 2018-04-26 | End: 2018-04-27

## 2018-04-26 RX ORDER — HEPARIN 100 UNIT/ML
500 SYRINGE INTRAVENOUS AS NEEDED
Status: ACTIVE | OUTPATIENT
Start: 2018-04-26 | End: 2018-04-27

## 2018-04-26 NOTE — PROGRESS NOTES
[facility] [address]  P [phone]  F [phone]    Office Note  Patient ID:  Name:  Jimena Escobar  MRN:  [id number]  :  1955/63 y.o. Date:  2018      HISTORY OF PRESENT ILLNESS:  Luz Sam is a 61 y.o.  postmenopausal female with a diagnosis of stage IA uterine carcinosarcoma. She underwent robotic hysterectomy with staging in late 2018. Pathology revealed LVSI but her sentinel nodes were negative. Pelvic washings also negative. She was recommended adjuvant chemotherapy with Taxol/Carboplatin due to her risk of recurrence. She has completed 2 cycles of chemotherapy so far. Other than some constipation she has really tolerated well so far. She reports minimal nausea. No neuropathy symptoms to speak of. ROS:   and GI review:  Negative  Cardiopulmonary review:  Negative   Musculoskeletal:  Negative    A comprehensive review of systems was negative except for that written in the History of Present Illness. , 10 point ROS        Problem List:  Patient Active Problem List    Diagnosis Date Noted    Severe obesity (BMI 35.0-39. 9) with comorbidity ([address] 2018    Elevated serum creatinine 2018    Uterine carcinosarcoma ([address] 2018    Obesity (BMI 30.0-34.9) 2018    Hypertension 2017    Chronic lung disease 2017    Stroke ([address] 2017     PMH:  Past Medical History:   Diagnosis Date    Cancer ([address]     UTERINE CA    Chronic pain     Hypercholesterolemia     Hypertension     Stroke ([address] 2007    R SIDE AFFECTED      PSH:  Past Surgical History:   Procedure Laterality Date    COLONOSCOPY,DIAGNOSTIC  2015         HX BREAST BIOPSY Right 1993    right breast:  benign    HX GI      COLONOSCOPY    HX ORTHOPAEDIC      GREAT L TOE BONE SPUR REMOVED      Social History:  Social History   Substance Use Topics    Smoking status: Former Smoker     Quit date: 10/4/1991    Smokeless tobacco: Never Used    Alcohol use No      Family History:  Family History   Problem Relation Age of Onset    Heart Disease Mother     Hypertension Mother     Diabetes Mother     Cancer Mother 62     breast cancer    Breast Cancer Mother      52's    Cancer Father 61     lung cancer    Stroke Brother     Stroke Maternal Grandmother     Stroke Maternal Grandfather     Kidney Disease Brother     Hypertension Brother       Medications: (reviewed)  Current Outpatient Prescriptions   Medication Sig    oxyCODONE-acetaminophen (PERCOCET) 5-325 mg per tablet Take 1 Tab by mouth every four (4) hours as needed for Pain. Max Daily Amount: 6 Tabs.  terconazole (TERAZOL 3) 0.8 % vaginal cream Apply topically to the vulva twice daily    dexamethasone (DECADRON) 4 mg tablet Take 2 tablets with breakfast the day before chemo and for 2 days after chemo    lidocaine-prilocaine (EMLA) topical cream Apply small amount over port area one hour before chemo treatment and cover with a Band-Aid    ondansetron (ZOFRAN ODT) 4 mg disintegrating tablet Take 1 Tab by mouth every eight (8) hours as needed for Nausea. Indications: CANCER CHEMOTHERAPY-INDUCED NAUSEA AND VOMITING    polyethylene glycol (MIRALAX) 17 gram/dose powder Take 17 g by mouth daily.  amLODIPine (NORVASC) 10 mg tablet Take 10 mg by mouth daily. Indications: pt states taking 1/2 tablet daily    Calcium-Cholecalciferol, D3, (CALCIUM 600 WITH VITAMIN D3) 600 mg(1,500mg) -400 unit chew Take 1 Tab by mouth daily.  meclizine (ANTIVERT) 25 mg tablet Take 25 mg by mouth as needed.  traMADol (ULTRAM) 50 mg tablet Take 50 mg by mouth nightly.  TRAMADOL HCL (TRAMADOL PO) Take 100 mg by mouth every morning.  aspirin (ASPIRIN) 325 mg tablet Take 325 mg by mouth daily.  atorvastatin (LIPITOR) 10 mg tablet Take 10 mg by mouth daily.  MULTIVITS W-FE,OTHER MIN/LUT (CENTRUM SILVER ULTRA WOMEN'S PO) Take  by mouth.     clopidogrel (PLAVIX) 75 mg tablet Take 75 mg by mouth daily.  gabapentin (NEURONTIN) 300 mg capsule Take 300 mg by mouth.  metoprolol (LOPRESSOR) 50 mg tablet Take 50 mg by mouth two (2) times a day.  oxybutynin (DITROPAN) 5 mg tablet Take 15 mg by mouth two (2) times a day. No current facility-administered medications for this visit. Allergies: (reviewed)  Allergies   Allergen Reactions    Latex Itching    Pcn [Penicillins] Unknown (comments)     Pt's states she doesn't know reaction. OBJECTIVE:    Physical Exam:  VITAL SIGNS: Vitals:    04/26/18 1213   BP: 120/74   Pulse: 81   Weight: 245 lb (111.1 kg)   Height: 5' 8.5\" (1.74 m)     Body mass index is 36.71 kg/(m^2). GENERAL MELVIN: Conversant, alert, oriented. No acute distress. HEENT: HEENT. No thyroid enlargement. No JVD. Neck: Supple without restrictions. RESPIRATORY: Clear to auscultation and percussion to the bases. No CVAT. CARDIOVASC: RRR without murmur/rub. GASTROINT: soft, non-tender, without masses or organomegaly   MUSCULOSKEL: no joint tenderness, deformity or swelling   EXTREMITIES: extremities normal, atraumatic, no cyanosis or edema   PELVIC: Deferred   RECTAL: Deferred   JOHN SURVEY: No suspicious lymphadenopathy or edema noted. NEURO: Grossly intact. No acute deficit.          Lab Results   Component Value Date/Time    WBC 11.4 (H) 04/09/2018 10:29 AM    HGB 11.8 04/09/2018 10:29 AM    HCT 36.5 04/09/2018 10:29 AM    PLATELET 282 86/27/7612 10:29 AM    MCV 91 04/09/2018 10:29 AM     Lab Results   Component Value Date/Time    Sodium 142 04/09/2018 10:29 AM    Potassium 3.6 04/09/2018 10:29 AM    Chloride 102 04/09/2018 10:29 AM    CO2 22 04/09/2018 10:29 AM    Anion gap 10 02/01/2018 06:08 AM    Glucose 92 04/09/2018 10:29 AM    BUN 21 04/09/2018 10:29 AM    Creatinine 1.27 (H) 04/09/2018 10:29 AM    BUN/Creatinine ratio 17 04/09/2018 10:29 AM    GFR est AA 52 (L) 04/09/2018 10:29 AM    GFR est non-AA 45 (L) 04/09/2018 10:29 AM    Calcium 9.6 04/09/2018 10:29 AM         IMPRESSION/PLAN:  June Autumn Luz is a 61 y.o. female with a diagnosis of stage IA uterine carcinosarcoma. She is currently receiving Taxol/Carboplatin chemotherapy. She has completed 2 cycles so far. She is tolerating well. We will continue with the current regimen.        Signed By: Maty Sampson MD     4/26/2018/12:10 PM

## 2018-04-26 NOTE — PROGRESS NOTES
4/16/18 1300  Pt came from Dr Magana [facility] office to get labs drawn peripherally for chemo next week.

## 2018-04-30 RX ORDER — GRANISETRON HYDROCHLORIDE 1 MG/ML
1 INJECTION INTRAVENOUS ONCE
Status: COMPLETED | OUTPATIENT
Start: 2018-05-02 | End: 2018-05-02

## 2018-04-30 RX ORDER — DIPHENHYDRAMINE HYDROCHLORIDE 50 MG/ML
50 INJECTION, SOLUTION INTRAMUSCULAR; INTRAVENOUS ONCE
Status: COMPLETED | OUTPATIENT
Start: 2018-05-02 | End: 2018-05-02

## 2018-05-02 ENCOUNTER — HOSPITAL ENCOUNTER (OUTPATIENT)
Dept: INFUSION THERAPY | Age: 63
Discharge: HOME | End: 2018-05-02
Payer: MEDICARE

## 2018-05-02 VITALS
BODY MASS INDEX: 37.07 KG/M2 | WEIGHT: 244.6 LBS | DIASTOLIC BLOOD PRESSURE: 88 MMHG | HEART RATE: 86 BPM | SYSTOLIC BLOOD PRESSURE: 148 MMHG | RESPIRATION RATE: 18 BRPM | HEIGHT: 68 IN | TEMPERATURE: 97.8 F

## 2018-05-02 PROCEDURE — 96375 TX/PRO/DX INJ NEW DRUG ADDON: CPT

## 2018-05-02 PROCEDURE — 96413 CHEMO IV INFUSION 1 HR: CPT

## 2018-05-02 PROCEDURE — 96415 CHEMO IV INFUSION ADDL HR: CPT

## 2018-05-02 PROCEDURE — 74011250636 HC RX REV CODE- 250/636: Performed by: OBSTETRICS & GYNECOLOGY

## 2018-05-02 PROCEDURE — 74011000258 HC RX REV CODE- 258: Performed by: OBSTETRICS & GYNECOLOGY

## 2018-05-02 PROCEDURE — 74011000250 HC RX REV CODE- 250: Performed by: OBSTETRICS & GYNECOLOGY

## 2018-05-02 PROCEDURE — 77030012965 HC NDL HUBR BBMI -A

## 2018-05-02 PROCEDURE — 96367 TX/PROPH/DG ADDL SEQ IV INF: CPT

## 2018-05-02 PROCEDURE — 96417 CHEMO IV INFUS EACH ADDL SEQ: CPT

## 2018-05-02 RX ORDER — SODIUM CHLORIDE 0.9 % (FLUSH) 0.9 %
10-40 SYRINGE (ML) INJECTION AS NEEDED
Status: ACTIVE | OUTPATIENT
Start: 2018-05-02 | End: 2018-05-03

## 2018-05-02 RX ORDER — HEPARIN 100 UNIT/ML
500 SYRINGE INTRAVENOUS AS NEEDED
Status: ACTIVE | OUTPATIENT
Start: 2018-05-02 | End: 2018-05-03

## 2018-05-02 RX ORDER — SODIUM CHLORIDE 9 MG/ML
10 INJECTION INTRAMUSCULAR; INTRAVENOUS; SUBCUTANEOUS AS NEEDED
Status: ACTIVE | OUTPATIENT
Start: 2018-05-02 | End: 2018-05-03

## 2018-05-02 RX ADMIN — SODIUM CHLORIDE, PRESERVATIVE FREE 500 UNITS: 5 INJECTION INTRAVENOUS at 16:00

## 2018-05-02 RX ADMIN — PACLITAXEL 410 MG: 6 INJECTION, SOLUTION INTRAVENOUS at 11:29

## 2018-05-02 RX ADMIN — DEXAMETHASONE SODIUM PHOSPHATE 12 MG: 4 INJECTION, SOLUTION INTRA-ARTICULAR; INTRALESIONAL; INTRAMUSCULAR; INTRAVENOUS; SOFT TISSUE at 10:30

## 2018-05-02 RX ADMIN — SODIUM CHLORIDE 150 MG: 900 INJECTION, SOLUTION INTRAVENOUS at 10:53

## 2018-05-02 RX ADMIN — SODIUM CHLORIDE 10 ML: 9 INJECTION INTRAMUSCULAR; INTRAVENOUS; SUBCUTANEOUS at 10:36

## 2018-05-02 RX ADMIN — CARBOPLATIN 664 MG: 10 INJECTION, SOLUTION INTRAVENOUS at 14:33

## 2018-05-02 RX ADMIN — FAMOTIDINE 20 MG: 10 INJECTION INTRAVENOUS at 10:29

## 2018-05-02 RX ADMIN — GRANISETRON HYDROCHLORIDE 1 MG: 1 INJECTION INTRAVENOUS at 10:30

## 2018-05-02 RX ADMIN — DIPHENHYDRAMINE HYDROCHLORIDE 50 MG: 50 INJECTION INTRAMUSCULAR; INTRAVENOUS at 10:30

## 2018-05-02 NOTE — PROGRESS NOTES
5 Pt admit to [facility] for C3 Paclitaxel/Carboplatin ambulatory in stable condition. Assessment completed. No new concerns voiced. Port accessed and flushed with positive blood return. Normal Saline started at [facility]. Visit Vitals    /88    Pulse 86    Temp 97.8 °F (36.6 °C)    Resp 18    Ht 5' 8\" (1.727 m)    Wt 110.9 kg (244 lb 9.6 oz)    LMP 01/09/2011    Breastfeeding No    BMI 37.19 kg/m2       Medications:  Normal Saline KVO  Benadryl IV Push  Pepcid IV Push  Kytril IV Push  Decadron IVPB  Emend IVPB  Paclitaxel  Carboplatin  Heparin Flush    1600 Pt tolerated treatment well. Port maintained positive blood return throughout treatment, flushed with positive blood return at conclusion and port heparinized and de-accessed. D/c home ambulatory in no distress. Pt aware of next [facility] appointment scheduled for 5/23/18.

## 2018-05-18 ENCOUNTER — TELEPHONE (OUTPATIENT)
Dept: GYNECOLOGY | Age: 63
End: 2018-05-18

## 2018-05-18 DIAGNOSIS — C55 UTERINE CARCINOSARCOMA (HCC): Primary | ICD-10-CM

## 2018-05-18 RX ORDER — DIPHENHYDRAMINE HYDROCHLORIDE 50 MG/ML
50 INJECTION, SOLUTION INTRAMUSCULAR; INTRAVENOUS ONCE
Status: COMPLETED | OUTPATIENT
Start: 2018-05-23 | End: 2018-05-23

## 2018-05-18 RX ORDER — GRANISETRON HYDROCHLORIDE 1 MG/ML
1 INJECTION INTRAVENOUS ONCE
Status: COMPLETED | OUTPATIENT
Start: 2018-05-23 | End: 2018-05-23

## 2018-05-21 ENCOUNTER — OFFICE VISIT (OUTPATIENT)
Dept: GYNECOLOGY | Age: 63
End: 2018-05-21

## 2018-05-21 VITALS
HEIGHT: 68 IN | HEART RATE: 77 BPM | SYSTOLIC BLOOD PRESSURE: 129 MMHG | BODY MASS INDEX: 36.68 KG/M2 | DIASTOLIC BLOOD PRESSURE: 66 MMHG | WEIGHT: 242 LBS

## 2018-05-21 DIAGNOSIS — C55 UTERINE CARCINOSARCOMA (HCC): Primary | ICD-10-CM

## 2018-05-21 DIAGNOSIS — E66.01 SEVERE OBESITY (BMI 35.0-39.9) WITH COMORBIDITY (HCC): ICD-10-CM

## 2018-05-21 DIAGNOSIS — R73.9 ELEVATED BLOOD SUGAR: ICD-10-CM

## 2018-05-21 RX ORDER — AMLODIPINE BESYLATE 5 MG/1
TABLET ORAL
COMMUNITY
Start: 2018-05-07 | End: 2019-04-30 | Stop reason: SDUPTHER

## 2018-05-21 RX ORDER — DOCUSATE SODIUM AND SENNOSIDES 8.6; 5 MG/1; MG/1
TABLET ORAL
Refills: 0 | COMMUNITY
Start: 2018-05-17

## 2018-05-21 NOTE — PROGRESS NOTES
pre chemo appt, labs to be drawn today for C4 Taxol/Carbo at [facility] on 5/23/18 at 8 am, Patient states she is no longer taking the following medications: Amlodipine 10 mg tablet, percocet and 325 mg aspirin

## 2018-05-21 NOTE — PROGRESS NOTES
[address] [place] [address]  Dr. Andrews Estimable     Dr. Laine Lucas. Mode, MARISA      Date of visit: 5/21/2018   HPI:  Luz Corea is a 61 y.o.  postmenopausal female who was referred by Dr. Alex Zamora. after pt presented with postmenopausal  Bleeding. A pelvic US revealed a thickend endometrium and her pap smear was AGCUS. She then underwent a colposcopy with cervical bx, ECC and EMB. The cervical biopsy and ECC were negative.  The EMB revealed carcinosarcoma of the endometrium. CT of chest/abd/pelvis showed uterine disease, no other visible evidence of metastatic disease. She underwent robotic hysterectomy with staging on January 31, 2018. Pathology revealed LVSI but her sentinel nodes were negative. Pelvic washings also negative. She has a diagnosis of stage IA uterine carcinosarcoma and Dr. Bhanu Mccormack recommended adjuvant chemotherapy with Taxol/Carboplatin due to her risk of recurrence    Onc History:  1/31/2018: Robotically assisted TLH/BSO/Left [place] PLND/Right pelvic node sampling  3/16/2018: Placement of port  3/21/2018: Began planned 6 cycles of Carbo/Taxol Q 21 days    Subjective:   Pt presents today prior to her 4th cycle of Carbo/Taxol  She is in good spirits and is pleased she is getting \"towards the end\". Said she feels she is getting better at managing her symptoms and this is why she feels si did well after her last treatment. She has been trying to eat better and drink more water. Pt was able to secure an new phone and feels \"safer\" with it. Current Outpatient Prescriptions   Medication Sig    amLODIPine (NORVASC) 5 mg tablet     SENNA-S 8.6-50 mg per tablet TK 1 T PO BID PRN    LOW-DOSE ASPIRIN PO Take  by mouth.  Calcium-Cholecalciferol, D3, (CALCIUM 600 WITH VITAMIN D3) 600 mg(1,500mg) -400 unit chew Take 1 Tab by mouth daily.  meclizine (ANTIVERT) 25 mg tablet Take 25 mg by mouth as needed.     traMADol (ULTRAM) 50 mg tablet Take 50 mg by mouth nightly.  atorvastatin (LIPITOR) 10 mg tablet Take 10 mg by mouth daily.  clopidogrel (PLAVIX) 75 mg tablet Take 75 mg by mouth daily.  gabapentin (NEURONTIN) 300 mg capsule Take 300 mg by mouth.  metoprolol (LOPRESSOR) 50 mg tablet Take 50 mg by mouth two (2) times a day.  oxybutynin (DITROPAN) 5 mg tablet Take 15 mg by mouth two (2) times a day.  oxyCODONE-acetaminophen (PERCOCET) 5-325 mg per tablet Take 1 Tab by mouth every four (4) hours as needed for Pain. Max Daily Amount: 6 Tabs.  terconazole (TERAZOL 3) 0.8 % vaginal cream Apply topically to the vulva twice daily    dexamethasone (DECADRON) 4 mg tablet Take 2 tablets with breakfast the day before chemo and for 2 days after chemo    lidocaine-prilocaine (EMLA) topical cream Apply small amount over port area one hour before chemo treatment and cover with a Band-Aid    ondansetron (ZOFRAN ODT) 4 mg disintegrating tablet Take 1 Tab by mouth every eight (8) hours as needed for Nausea. Indications: CANCER CHEMOTHERAPY-INDUCED NAUSEA AND VOMITING    amLODIPine (NORVASC) 10 mg tablet Take 10 mg by mouth daily. Indications: pt states taking 1/2 tablet daily    aspirin (ASPIRIN) 325 mg tablet Take 325 mg by mouth daily. No current facility-administered medications for this visit.       Facility-Administered Medications Ordered in Other Visits   Medication Dose Route Frequency    [START ON 5/23/2018] diphenhydrAMINE (BENADRYL) injection 50 mg  50 mg IntraVENous ONCE    [START ON 5/23/2018] famotidine (PF) (PEPCID) 20 mg in sodium chloride 0.9% 10 mL injection  20 mg IntraVENous ONCE    [START ON 5/23/2018] granisetron (KYTRIL) injection 1 mg  1 mg IntraVENous ONCE    [START ON 5/23/2018] dexamethasone (DECADRON) 12 mg in dextrose 5% 50 mL IVPB  12 mg IntraVENous ONCE    [START ON 5/23/2018] fosaprepitant (EMEND) 150 mg in 0.9% sodium chloride 150 mL IVPB  150 mg IntraVENous ONCE    [START ON 5/23/2018] PACLitaxel (TAXOL) 410 mg in 0.9% sodium chloride 250 mL, overfill volume 25 mL chemo infusion  410 mg IntraVENous ONCE    [START ON 5/23/2018] CARBOplatin (PARAPLATIN) 664 mg in 0.9% sodium chloride 250 mL, overfill volume 25 mL chemo infusion  664 mg IntraVENous ONCE        Review of Systems:  General: Had 3 lb wt loss, but pt is \"trying to eat better\" and get her wt under 200 lbs. Continues with a lot of fatigue since her stroke, but says it is unchanged. HEENT: Denies visual changes, dysphagia or headache  Resp: Denies SOB, PRIETO, wheezing or cough  CV: Denies CP, palpitations  GI/:Constipation has improved with daily [place]. .  Denies N/V, bloating, diarrhea, or dysuria  MuskSkel:Acknowledges persistent difficulty with right sided weakness, drop foot and hand grasp. Says she continues to work with her physical therapy. Denies muscle ache or joint pain. Neuro: Denies neuropathy, dizzyness or syncope  Psych: Denies depression or feelings of sadness  Integumentary: multiple areas of ecchymosis noted to upper arms. Pt says this has been going on \"a long time before chemo, but has become a little worse since starting chemo\". (pt denies being hurt and feels safe). Her nail beds are noted with some darkening at basses of all nails. Objective:     Patient Vitals for the past 8 hrs:   Height Weight   04/11/18 1016 5' 8.5\" (1.74 m) 248 lb (112.5 kg)       Physical Exam:  General: A&O X3 in NAD  HEENT: Sclera anicteric. Mucosa pink, moist without lesions. Pupils equal and reactive to light. Glasses in use  Neck: No JVD or cervical adenopathy appreciated. Port Site: without redness, swelling or tenderness. Heart: Regular without M/R/G  Lungs: CTA Bilat without wheezing or rales. No cough noted  Abd: Soft, obese, NT/ND with + BS throughout  Ext: Without edema and + pedal pulses bilat. Right lower ext remains with leg brace in place. Right arm somewhat retracted, but pt uses it as much as possible.    Neuro: grossly intact        Assessment:     Patient Active Problem List   Diagnosis Code    Hypertension I10    Chronic lung disease J98.4    Stroke ([facility] I63.9    Uterine carcinosarcoma ([facility] C55    Obesity (BMI 30.0-34. 9) E66.9    Elevated serum creatinine R79.89    Severe obesity (BMI 35.0-39. 9) with comorbidity ([facility] E66.01         Plan: Will check labs today and if stable, will proceed with C4 on 5/23 as planned  She will continue her Plavix, and antihypertensives and follow with PCP. Monitor plt's and bruising.   Zofran PRN available to pt  Senna daily with Miralax PRN  Hydration importance and rational discussed and encouraged  Encouraged to call for any concerns or questions  Doug Damon NP

## 2018-05-22 LAB
ALBUMIN SERPL-MCNC: 4.2 G/DL (ref 3.6–4.8)
ALBUMIN/GLOB SERPL: 1.8 {RATIO} (ref 1.2–2.2)
ALP SERPL-CCNC: 107 IU/L (ref 39–117)
ALT SERPL-CCNC: 13 IU/L (ref 0–32)
AST SERPL-CCNC: 16 IU/L (ref 0–40)
BASOPHILS # BLD AUTO: 0 X10E3/UL (ref 0–0.2)
BASOPHILS NFR BLD AUTO: 0 %
BILIRUB SERPL-MCNC: 0.4 MG/DL (ref 0–1.2)
BUN SERPL-MCNC: 22 MG/DL (ref 8–27)
BUN/CREAT SERPL: 18 (ref 12–28)
CALCIUM SERPL-MCNC: 8.8 MG/DL (ref 8.7–10.3)
CANCER AG125 SERPL-ACNC: 27 U/ML (ref 0–38.1)
CHLORIDE SERPL-SCNC: 102 MMOL/L (ref 96–106)
CO2 SERPL-SCNC: 24 MMOL/L (ref 18–29)
CREAT SERPL-MCNC: 1.24 MG/DL (ref 0.57–1)
EOSINOPHIL # BLD AUTO: 0 X10E3/UL (ref 0–0.4)
EOSINOPHIL NFR BLD AUTO: 0 %
ERYTHROCYTE [DISTWIDTH] IN BLOOD BY AUTOMATED COUNT: 19.5 % (ref 12.3–15.4)
EST. AVERAGE GLUCOSE BLD GHB EST-MCNC: 117 MG/DL
GFR SERPLBLD CREATININE-BSD FMLA CKD-EPI: 46 ML/MIN/1.73
GFR SERPLBLD CREATININE-BSD FMLA CKD-EPI: 53 ML/MIN/1.73
GLOBULIN SER CALC-MCNC: 2.4 G/DL (ref 1.5–4.5)
GLUCOSE SERPL-MCNC: 82 MG/DL (ref 65–99)
HBA1C MFR BLD: 5.7 % (ref 4.8–5.6)
HCT VFR BLD AUTO: 28.6 % (ref 34–46.6)
HGB BLD-MCNC: 9 G/DL (ref 11.1–15.9)
IMM GRANULOCYTES # BLD: 0 X10E3/UL (ref 0–0.1)
IMM GRANULOCYTES NFR BLD: 0 %
LYMPHOCYTES # BLD AUTO: 2.6 X10E3/UL (ref 0.7–3.1)
LYMPHOCYTES NFR BLD AUTO: 54 %
MAGNESIUM SERPL-MCNC: 2 MG/DL (ref 1.6–2.3)
MCH RBC QN AUTO: 30.4 PG (ref 26.6–33)
MCHC RBC AUTO-ENTMCNC: 31.5 G/DL (ref 31.5–35.7)
MCV RBC AUTO: 97 FL (ref 79–97)
MONOCYTES # BLD AUTO: 0.3 X10E3/UL (ref 0.1–0.9)
MONOCYTES NFR BLD AUTO: 7 %
MORPHOLOGY BLD-IMP: ABNORMAL
NEUTROPHILS # BLD AUTO: 1.9 X10E3/UL (ref 1.4–7)
NEUTROPHILS NFR BLD AUTO: 39 %
PLATELET # BLD AUTO: ABNORMAL X10E3/UL
POTASSIUM SERPL-SCNC: 4.7 MMOL/L (ref 3.5–5.2)
PROT SERPL-MCNC: 6.6 G/DL (ref 6–8.5)
RBC # BLD AUTO: 2.96 X10E6/UL (ref 3.77–5.28)
SODIUM SERPL-SCNC: 139 MMOL/L (ref 134–144)
WBC # BLD AUTO: 4.8 X10E3/UL (ref 3.4–10.8)

## 2018-05-23 ENCOUNTER — HOSPITAL ENCOUNTER (OUTPATIENT)
Dept: INFUSION THERAPY | Age: 63
Discharge: HOME | End: 2018-05-23
Payer: MEDICARE

## 2018-05-23 VITALS
HEART RATE: 75 BPM | TEMPERATURE: 98.9 F | HEIGHT: 68 IN | BODY MASS INDEX: 36.86 KG/M2 | WEIGHT: 243.2 LBS | DIASTOLIC BLOOD PRESSURE: 73 MMHG | SYSTOLIC BLOOD PRESSURE: 130 MMHG | RESPIRATION RATE: 18 BRPM

## 2018-05-23 LAB
BASOPHILS # BLD: 0 K/UL (ref 0–0.1)
BASOPHILS NFR BLD: 0 % (ref 0–1)
DIFFERENTIAL METHOD BLD: ABNORMAL
EOSINOPHIL # BLD: 0 K/UL (ref 0–0.4)
EOSINOPHIL NFR BLD: 0 % (ref 0–7)
ERYTHROCYTE [DISTWIDTH] IN BLOOD BY AUTOMATED COUNT: 18.6 % (ref 11.5–14.5)
HCT VFR BLD AUTO: 29.9 % (ref 35–47)
HGB BLD-MCNC: 9.3 G/DL (ref 11.5–16)
IMM GRANULOCYTES # BLD: 0.1 K/UL (ref 0–0.04)
IMM GRANULOCYTES NFR BLD AUTO: 1 % (ref 0–0.5)
LYMPHOCYTES # BLD: 1.7 K/UL (ref 0.8–3.5)
LYMPHOCYTES NFR BLD: 18 % (ref 12–49)
MCH RBC QN AUTO: 30.6 PG (ref 26–34)
MCHC RBC AUTO-ENTMCNC: 31.1 G/DL (ref 30–36.5)
MCV RBC AUTO: 98.4 FL (ref 80–99)
MONOCYTES # BLD: 0.5 K/UL (ref 0–1)
MONOCYTES NFR BLD: 5 % (ref 5–13)
NEUTS SEG # BLD: 7.4 K/UL (ref 1.8–8)
NEUTS SEG NFR BLD: 76 % (ref 32–75)
NRBC # BLD: 0.24 K/UL (ref 0–0.01)
NRBC BLD-RTO: 2.5 PER 100 WBC
PLATELET # BLD AUTO: 114 K/UL (ref 150–400)
PMV BLD AUTO: 10.9 FL (ref 8.9–12.9)
RBC # BLD AUTO: 3.04 M/UL (ref 3.8–5.2)
RBC MORPH BLD: ABNORMAL
RBC MORPH BLD: ABNORMAL
WBC # BLD AUTO: 9.7 K/UL (ref 3.6–11)

## 2018-05-23 PROCEDURE — 74011000258 HC RX REV CODE- 258: Performed by: OBSTETRICS & GYNECOLOGY

## 2018-05-23 PROCEDURE — 85025 COMPLETE CBC W/AUTO DIFF WBC: CPT | Performed by: OBSTETRICS & GYNECOLOGY

## 2018-05-23 PROCEDURE — 96413 CHEMO IV INFUSION 1 HR: CPT

## 2018-05-23 PROCEDURE — 77030012965 HC NDL HUBR BBMI -A

## 2018-05-23 PROCEDURE — 74011250636 HC RX REV CODE- 250/636: Performed by: OBSTETRICS & GYNECOLOGY

## 2018-05-23 PROCEDURE — 96375 TX/PRO/DX INJ NEW DRUG ADDON: CPT

## 2018-05-23 PROCEDURE — 96415 CHEMO IV INFUSION ADDL HR: CPT

## 2018-05-23 PROCEDURE — 96417 CHEMO IV INFUS EACH ADDL SEQ: CPT

## 2018-05-23 PROCEDURE — 74011000250 HC RX REV CODE- 250: Performed by: OBSTETRICS & GYNECOLOGY

## 2018-05-23 PROCEDURE — 96367 TX/PROPH/DG ADDL SEQ IV INF: CPT

## 2018-05-23 PROCEDURE — 36415 COLL VENOUS BLD VENIPUNCTURE: CPT | Performed by: OBSTETRICS & GYNECOLOGY

## 2018-05-23 RX ORDER — SODIUM CHLORIDE 9 MG/ML
10 INJECTION INTRAMUSCULAR; INTRAVENOUS; SUBCUTANEOUS AS NEEDED
Status: ACTIVE | OUTPATIENT
Start: 2018-05-23 | End: 2018-05-24

## 2018-05-23 RX ORDER — SODIUM CHLORIDE 0.9 % (FLUSH) 0.9 %
5-10 SYRINGE (ML) INJECTION AS NEEDED
Status: ACTIVE | OUTPATIENT
Start: 2018-05-23 | End: 2018-05-24

## 2018-05-23 RX ORDER — HEPARIN 100 UNIT/ML
500 SYRINGE INTRAVENOUS AS NEEDED
Status: ACTIVE | OUTPATIENT
Start: 2018-05-23 | End: 2018-05-24

## 2018-05-23 RX ADMIN — PACLITAXEL 410 MG: 6 INJECTION, SOLUTION INTRAVENOUS at 12:58

## 2018-05-23 RX ADMIN — SODIUM CHLORIDE 150 MG: 900 INJECTION, SOLUTION INTRAVENOUS at 12:34

## 2018-05-23 RX ADMIN — DEXAMETHASONE SODIUM PHOSPHATE 12 MG: 4 INJECTION, SOLUTION INTRA-ARTICULAR; INTRALESIONAL; INTRAMUSCULAR; INTRAVENOUS; SOFT TISSUE at 12:08

## 2018-05-23 RX ADMIN — SODIUM CHLORIDE 10 ML: 9 INJECTION INTRAMUSCULAR; INTRAVENOUS; SUBCUTANEOUS at 09:30

## 2018-05-23 RX ADMIN — GRANISETRON HYDROCHLORIDE 1 MG: 1 INJECTION INTRAVENOUS at 12:03

## 2018-05-23 RX ADMIN — SODIUM CHLORIDE, PRESERVATIVE FREE 500 UNITS: 5 INJECTION INTRAVENOUS at 17:15

## 2018-05-23 RX ADMIN — Medication 10 ML: at 17:15

## 2018-05-23 RX ADMIN — CARBOPLATIN 664 MG: 10 INJECTION, SOLUTION INTRAVENOUS at 16:03

## 2018-05-23 RX ADMIN — FAMOTIDINE 20 MG: 10 INJECTION, SOLUTION INTRAVENOUS at 12:01

## 2018-05-23 RX ADMIN — Medication 10 ML: at 09:30

## 2018-05-23 RX ADMIN — DIPHENHYDRAMINE HYDROCHLORIDE 50 MG: 50 INJECTION INTRAMUSCULAR; INTRAVENOUS at 12:06

## 2018-05-23 NOTE — PROGRESS NOTES
0930 Pt admit to [facility] for C4 Paclitaxel/Carboplatin ambulatory in stable condition. Assessment completed. No new concerns voiced. Port accessed and flushed with positive blood return. Labs drawn per order and sent for processing. Normal Saline started at [facility]. Labs reviewed, medication ordered. Visit Vitals    /73    Pulse 75    Temp 98.9 °F (37.2 °C)    Resp 18    Ht 5' 8\" (1.727 m)    Wt 110.3 kg (243 lb 3.2 oz)    LMP 01/09/2011    BMI 36.98 kg/m2       Medications:  Normal Saline KVO  Benadryl IV Push  Pepcid IV Push  Kytril IV Push  Decadron IVPB  Emend IVPB  Paclitaxel  Carboplatin  Heparin Flush    1715 Pt tolerated treatment well. Port maintained positive blood return throughout treatment, flushed with positive blood return at conclusion and port heparinized and de-accessed per protocol. D/c home ambulatory in no distress. Pt aware of next [facility] appointment scheduled for 6/13/18. Recent Results (from the past 12 hour(s))   CBC WITH AUTOMATED DIFF    Collection Time: 05/23/18  9:34 AM   Result Value Ref Range    WBC 9.7 3.6 - 11.0 K/uL    RBC 3.04 (L) 3.80 - 5.20 M/uL    HGB 9.3 (L) 11.5 - 16.0 g/dL    HCT 29.9 (L) 35.0 - 47.0 %    MCV 98.4 80.0 - 99.0 FL    MCH 30.6 26.0 - 34.0 PG    MCHC 31.1 30.0 - 36.5 g/dL    RDW 18.6 (H) 11.5 - 14.5 %    PLATELET 006 (L) 032 - 400 K/uL    MPV 10.9 8.9 - 12.9 FL    NRBC 2.5 (H) 0  WBC    ABSOLUTE NRBC 0.24 (H) 0.00 - 0.01 K/uL    NEUTROPHILS 76 (H) 32 - 75 %    LYMPHOCYTES 18 12 - 49 %    MONOCYTES 5 5 - 13 %    EOSINOPHILS 0 0 - 7 %    BASOPHILS 0 0 - 1 %    IMMATURE GRANULOCYTES 1 (H) 0.0 - 0.5 %    ABS. NEUTROPHILS 7.4 1.8 - 8.0 K/UL    ABS. LYMPHOCYTES 1.7 0.8 - 3.5 K/UL    ABS. MONOCYTES 0.5 0.0 - 1.0 K/UL    ABS. EOSINOPHILS 0.0 0.0 - 0.4 K/UL    ABS. BASOPHILS 0.0 0.0 - 0.1 K/UL    ABS. IMM.  GRANS. 0.1 (H) 0.00 - 0.04 K/UL    DF AUTOMATED      RBC COMMENTS ANISOCYTOSIS  1+        RBC COMMENTS MACROCYTOSIS  1+

## 2018-06-05 ENCOUNTER — TELEPHONE (OUTPATIENT)
Dept: GYNECOLOGY | Age: 63
End: 2018-06-05

## 2018-06-05 NOTE — TELEPHONE ENCOUNTER
Pt returned my call and I advised her of 11am labs at [address] for her next pre chemo appts with Dr. Iva Corral.

## 2018-06-09 RX ORDER — DIPHENHYDRAMINE HYDROCHLORIDE 50 MG/ML
50 INJECTION, SOLUTION INTRAMUSCULAR; INTRAVENOUS ONCE
Status: COMPLETED | OUTPATIENT
Start: 2018-06-13 | End: 2018-06-13

## 2018-06-09 RX ORDER — GRANISETRON HYDROCHLORIDE 1 MG/ML
1 INJECTION INTRAVENOUS ONCE
Status: COMPLETED | OUTPATIENT
Start: 2018-06-13 | End: 2018-06-13

## 2018-06-12 ENCOUNTER — TELEPHONE (OUTPATIENT)
Dept: GYNECOLOGY | Age: 63
End: 2018-06-12

## 2018-06-12 ENCOUNTER — OFFICE VISIT (OUTPATIENT)
Dept: GYNECOLOGY | Age: 63
End: 2018-06-12

## 2018-06-12 ENCOUNTER — HOSPITAL ENCOUNTER (OUTPATIENT)
Dept: INFUSION THERAPY | Age: 63
Discharge: HOME | End: 2018-06-12
Payer: MEDICARE

## 2018-06-12 VITALS
TEMPERATURE: 97.9 F | SYSTOLIC BLOOD PRESSURE: 137 MMHG | RESPIRATION RATE: 18 BRPM | DIASTOLIC BLOOD PRESSURE: 81 MMHG | HEART RATE: 78 BPM

## 2018-06-12 VITALS
DIASTOLIC BLOOD PRESSURE: 72 MMHG | SYSTOLIC BLOOD PRESSURE: 111 MMHG | BODY MASS INDEX: 35.16 KG/M2 | WEIGHT: 232 LBS | HEART RATE: 73 BPM | HEIGHT: 68 IN

## 2018-06-12 DIAGNOSIS — C55 UTERINE CARCINOSARCOMA (HCC): Primary | ICD-10-CM

## 2018-06-12 LAB
ALBUMIN SERPL-MCNC: 3.4 G/DL (ref 3.5–5)
ALBUMIN/GLOB SERPL: 0.9 {RATIO} (ref 1.1–2.2)
ALP SERPL-CCNC: 96 U/L (ref 45–117)
ALT SERPL-CCNC: 22 U/L (ref 12–78)
ANION GAP SERPL CALC-SCNC: 10 MMOL/L (ref 5–15)
AST SERPL-CCNC: 19 U/L (ref 15–37)
BASOPHILS # BLD: 0 K/UL (ref 0–0.1)
BASOPHILS NFR BLD: 0 % (ref 0–1)
BILIRUB SERPL-MCNC: 0.4 MG/DL (ref 0.2–1)
BUN SERPL-MCNC: 19 MG/DL (ref 6–20)
BUN/CREAT SERPL: 14 (ref 12–20)
CALCIUM SERPL-MCNC: 9 MG/DL (ref 8.5–10.1)
CANCER AG125 SERPL-ACNC: 23 U/ML (ref 1.5–35)
CHLORIDE SERPL-SCNC: 109 MMOL/L (ref 97–108)
CO2 SERPL-SCNC: 22 MMOL/L (ref 21–32)
CREAT SERPL-MCNC: 1.36 MG/DL (ref 0.55–1.02)
DIFFERENTIAL METHOD BLD: ABNORMAL
EOSINOPHIL # BLD: 0 K/UL (ref 0–0.4)
EOSINOPHIL NFR BLD: 0 % (ref 0–7)
ERYTHROCYTE [DISTWIDTH] IN BLOOD BY AUTOMATED COUNT: 20.7 % (ref 11.5–14.5)
GLOBULIN SER CALC-MCNC: 3.9 G/DL (ref 2–4)
GLUCOSE SERPL-MCNC: 86 MG/DL (ref 65–100)
HCT VFR BLD AUTO: 24.2 % (ref 35–47)
HGB BLD-MCNC: 7.6 G/DL (ref 11.5–16)
IMM GRANULOCYTES # BLD: 0.1 K/UL (ref 0–0.04)
IMM GRANULOCYTES NFR BLD AUTO: 1 % (ref 0–0.5)
LYMPHOCYTES # BLD: 1.3 K/UL (ref 0.8–3.5)
LYMPHOCYTES NFR BLD: 20 % (ref 12–49)
MAGNESIUM SERPL-MCNC: 1.8 MG/DL (ref 1.6–2.4)
MCH RBC QN AUTO: 31.8 PG (ref 26–34)
MCHC RBC AUTO-ENTMCNC: 31.4 G/DL (ref 30–36.5)
MCV RBC AUTO: 101.3 FL (ref 80–99)
MONOCYTES # BLD: 0.5 K/UL (ref 0–1)
MONOCYTES NFR BLD: 7 % (ref 5–13)
NEUTS SEG # BLD: 4.7 K/UL (ref 1.8–8)
NEUTS SEG NFR BLD: 72 % (ref 32–75)
NRBC # BLD: 0.2 K/UL (ref 0–0.01)
NRBC BLD-RTO: 3 PER 100 WBC
PLATELET # BLD AUTO: 166 K/UL (ref 150–400)
PMV BLD AUTO: 10.9 FL (ref 8.9–12.9)
POTASSIUM SERPL-SCNC: 3.8 MMOL/L (ref 3.5–5.1)
PROT SERPL-MCNC: 7.3 G/DL (ref 6.4–8.2)
RBC # BLD AUTO: 2.39 M/UL (ref 3.8–5.2)
RBC MORPH BLD: ABNORMAL
SODIUM SERPL-SCNC: 141 MMOL/L (ref 136–145)
WBC # BLD AUTO: 6.6 K/UL (ref 3.6–11)

## 2018-06-12 PROCEDURE — 80053 COMPREHEN METABOLIC PANEL: CPT | Performed by: OBSTETRICS & GYNECOLOGY

## 2018-06-12 PROCEDURE — 83735 ASSAY OF MAGNESIUM: CPT | Performed by: OBSTETRICS & GYNECOLOGY

## 2018-06-12 PROCEDURE — 86304 IMMUNOASSAY TUMOR CA 125: CPT | Performed by: OBSTETRICS & GYNECOLOGY

## 2018-06-12 PROCEDURE — 36415 COLL VENOUS BLD VENIPUNCTURE: CPT | Performed by: OBSTETRICS & GYNECOLOGY

## 2018-06-12 PROCEDURE — 85025 COMPLETE CBC W/AUTO DIFF WBC: CPT | Performed by: OBSTETRICS & GYNECOLOGY

## 2018-06-12 RX ORDER — SODIUM CHLORIDE 9 MG/ML
10 INJECTION INTRAMUSCULAR; INTRAVENOUS; SUBCUTANEOUS AS NEEDED
Status: ACTIVE | OUTPATIENT
Start: 2018-06-12 | End: 2018-06-13

## 2018-06-12 RX ORDER — SODIUM CHLORIDE 0.9 % (FLUSH) 0.9 %
5-10 SYRINGE (ML) INJECTION AS NEEDED
Status: ACTIVE | OUTPATIENT
Start: 2018-06-12 | End: 2018-06-13

## 2018-06-12 RX ORDER — HEPARIN 100 UNIT/ML
500 SYRINGE INTRAVENOUS AS NEEDED
Status: ACTIVE | OUTPATIENT
Start: 2018-06-12 | End: 2018-06-13

## 2018-06-12 NOTE — PROGRESS NOTES
[facility] Lab Visit:    9320:  Pt arrived ambulatory with cane and in no distress, labs drawn per order and sent for processing. Departed [facility] ambulatory and in no distress. Visit Vitals    /81 (BP 1 Location: Left arm, BP Patient Position: Sitting)    Pulse 78    Temp 97.9 °F (36.6 °C)    Resp 18    LMP 01/09/2011       Labs available in CC once resulted.

## 2018-06-12 NOTE — TELEPHONE ENCOUNTER
[facility] for results of today's lab result of HGB low at 7.6, and to discuss blood transfusion.

## 2018-06-12 NOTE — PROGRESS NOTES
[address] [facility] [address], [address] [facility]  P [phone]  F [phone]    Office Note  Patient ID:  Name:  Michael Solorio  MRN:  [id number]  :  1955/63 y.o. Date:  2018      HISTORY OF PRESENT ILLNESS:  Luz Fidelina Garnica is a 61 y.o.  postmenopausal female with a diagnosis of stage IA uterine carcinosarcoma. She underwent robotic hysterectomy with staging in late 2018. Pathology revealed LVSI but her sentinel nodes were negative. Pelvic washings were also negative. She was recommended adjuvant chemotherapy with Taxol/Carboplatin due to her risk of recurrence. She has completed 4 cycles of chemotherapy so far. Other than some constipation she has really tolerated well so far. She reports minimal nausea. No neuropathy symptoms to speak of. She broke her toe since her last appointment and is in a boot at this time. ROS:   and GI review:  Negative  Cardiopulmonary review:  Negative   Musculoskeletal:  Negative    A comprehensive review of systems was negative except for that written in the History of Present Illness. , 10 point ROS        Problem List:  Patient Active Problem List    Diagnosis Date Noted    Severe obesity (BMI 35.0-39. 9) with comorbidity ([address] 2018    Elevated serum creatinine 2018    Uterine carcinosarcoma ([address] 2018    Obesity (BMI 30.0-34.9) 2018    Hypertension 2017    Chronic lung disease 2017    Stroke ([address] 2017     PMH:  Past Medical History:   Diagnosis Date    Cancer ([address]     UTERINE CA    Chronic pain     Hypercholesterolemia     Hypertension     Stroke ([address] 2007    R SIDE AFFECTED      PSH:  Past Surgical History:   Procedure Laterality Date    COLONOSCOPY,DIAGNOSTIC  2015         HX BREAST BIOPSY Right     right breast:  benign    HX GI      COLONOSCOPY    HX ORTHOPAEDIC      GREAT L TOE BONE SPUR REMOVED      Social History:  Social History   Substance Use Topics    Smoking status: Former Smoker     Quit date: 10/4/1991    Smokeless tobacco: Never Used    Alcohol use No      Family History:  Family History   Problem Relation Age of Onset    Heart Disease Mother     Hypertension Mother     Diabetes Mother     Cancer Mother 62     breast cancer    Breast Cancer Mother      52's    Cancer Father 61     lung cancer    Stroke Brother     Stroke Maternal Grandmother     Stroke Maternal Grandfather     Kidney Disease Brother     Hypertension Brother       Medications: (reviewed)  Current Outpatient Prescriptions   Medication Sig    amLODIPine (NORVASC) 5 mg tablet     SENNA-S 8.6-50 mg per tablet TK 1 T PO BID PRN    LOW-DOSE ASPIRIN PO Take  by mouth.  dexamethasone (DECADRON) 4 mg tablet Take 2 tablets with breakfast the day before chemo and for 2 days after chemo    lidocaine-prilocaine (EMLA) topical cream Apply small amount over port area one hour before chemo treatment and cover with a Band-Aid    ondansetron (ZOFRAN ODT) 4 mg disintegrating tablet Take 1 Tab by mouth every eight (8) hours as needed for Nausea. Indications: CANCER CHEMOTHERAPY-INDUCED NAUSEA AND VOMITING    meclizine (ANTIVERT) 25 mg tablet Take 25 mg by mouth as needed.  traMADol (ULTRAM) 50 mg tablet Take 50 mg by mouth nightly.  aspirin (ASPIRIN) 325 mg tablet Take 325 mg by mouth daily.  atorvastatin (LIPITOR) 10 mg tablet Take 10 mg by mouth daily.  clopidogrel (PLAVIX) 75 mg tablet Take 75 mg by mouth daily.  gabapentin (NEURONTIN) 300 mg capsule Take 300 mg by mouth.  metoprolol (LOPRESSOR) 50 mg tablet Take 50 mg by mouth two (2) times a day.  oxybutynin (DITROPAN) 5 mg tablet Take 15 mg by mouth two (2) times a day.  oxyCODONE-acetaminophen (PERCOCET) 5-325 mg per tablet Take 1 Tab by mouth every four (4) hours as needed for Pain. Max Daily Amount: 6 Tabs.     amLODIPine (NORVASC) 10 mg tablet Take 10 mg by mouth daily. Indications: pt states taking 1/2 tablet daily     No current facility-administered medications for this visit. Facility-Administered Medications Ordered in Other Visits   Medication Dose Route Frequency    sodium chloride (NS) flush 5-10 mL  5-10 mL IntraVENous PRN    sodium chloride 0.9% injection 10 mL  10 mL IntraVENous PRN    heparin (porcine) pf 500 Units  500 Units IntraVENous PRN    [START ON 6/13/2018] diphenhydrAMINE (BENADRYL) injection 50 mg  50 mg IntraVENous ONCE    [START ON 6/13/2018] famotidine (PF) (PEPCID) 20 mg in sodium chloride 0.9% 10 mL injection  20 mg IntraVENous ONCE    [START ON 6/13/2018] granisetron (KYTRIL) injection 1 mg  1 mg IntraVENous ONCE    [START ON 6/13/2018] dexamethasone (DECADRON) 12 mg in dextrose 5% 50 mL IVPB  12 mg IntraVENous ONCE    [START ON 6/13/2018] fosaprepitant (EMEND) 150 mg in 0.9% sodium chloride 150 mL IVPB  150 mg IntraVENous ONCE    [START ON 6/13/2018] PACLitaxel (TAXOL) 410 mg in 0.9% sodium chloride 250 mL, overfill volume 25 mL chemo infusion  410 mg IntraVENous ONCE    [START ON 6/13/2018] CARBOplatin (PARAPLATIN) 664 mg in 0.9% sodium chloride 250 mL, overfill volume 25 mL chemo infusion  664 mg IntraVENous ONCE     Allergies: (reviewed)  Allergies   Allergen Reactions    Latex Itching    Pcn [Penicillins] Unknown (comments)     Pt's states she doesn't know reaction. OBJECTIVE:    Physical Exam:  VITAL SIGNS: Vitals:    06/12/18 1127   BP: 111/72   Pulse: 73   Weight: 232 lb (105.2 kg)   Height: 5' 7.99\" (1.727 m)     Body mass index is 35.28 kg/(m^2). GENERAL MELVIN: Conversant, alert, oriented. No acute distress. HEENT: HEENT. No thyroid enlargement. No JVD. Neck: Supple without restrictions. RESPIRATORY: Clear to auscultation and percussion to the bases. No CVAT. CARDIOVASC: RRR without murmur/rub.    GASTROINT: soft, non-tender, without masses or organomegaly   MUSCULOSKEL: no joint tenderness, deformity or swelling   EXTREMITIES: extremities normal, atraumatic, no cyanosis or edema   PELVIC: Deferred   RECTAL: Deferred   JOHN SURVEY: No suspicious lymphadenopathy or edema noted. NEURO: Grossly intact. No acute deficit. Lab Results   Component Value Date/Time    WBC 6.6 06/12/2018 11:07 AM    HGB 7.6 (L) 06/12/2018 11:07 AM    HCT 24.2 (L) 06/12/2018 11:07 AM    PLATELET 656 83/23/1588 11:07 AM    .3 (H) 06/12/2018 11:07 AM     Lab Results   Component Value Date/Time    Sodium 139 05/21/2018 11:04 AM    Potassium 4.7 05/21/2018 11:04 AM    Chloride 102 05/21/2018 11:04 AM    CO2 24 05/21/2018 11:04 AM    Anion gap 9 04/26/2018 01:00 PM    Glucose 82 05/21/2018 11:04 AM    BUN 22 05/21/2018 11:04 AM    Creatinine 1.24 (H) 05/21/2018 11:04 AM    BUN/Creatinine ratio 18 05/21/2018 11:04 AM    GFR est AA 53 (L) 05/21/2018 11:04 AM    GFR est non-AA 46 (L) 05/21/2018 11:04 AM    Calcium 8.8 05/21/2018 11:04 AM     Lab Results   Component Value Date/Time    CA-125 19 04/26/2018 01:00 PM    Cancer Ag (CA) 125 27.0 05/21/2018 11:04 AM       IMPRESSION/PLAN:  Luz Rocha is a 61 y.o. female with a diagnosis of stage IA uterine carcinosarcoma. She is currently receiving Taxol/Carboplatin chemotherapy. She has completed 4 cycles so far. She is tolerating well. We will continue with the current regimen. We will obtain a PET after the sixth cycle. We will then discuss possible radiation therapy.       Signed By: Joy Sparks MD     6/12/2018/12:10 PM

## 2018-06-13 ENCOUNTER — HOSPITAL ENCOUNTER (OUTPATIENT)
Dept: INFUSION THERAPY | Age: 63
Discharge: HOME | End: 2018-06-13
Payer: MEDICARE

## 2018-06-13 VITALS
TEMPERATURE: 98 F | DIASTOLIC BLOOD PRESSURE: 72 MMHG | WEIGHT: 234 LBS | HEIGHT: 69 IN | OXYGEN SATURATION: 99 % | BODY MASS INDEX: 34.66 KG/M2 | SYSTOLIC BLOOD PRESSURE: 132 MMHG | RESPIRATION RATE: 18 BRPM | HEART RATE: 78 BPM

## 2018-06-13 PROBLEM — D64.9 ANEMIA: Status: ACTIVE | Noted: 2018-06-13

## 2018-06-13 PROCEDURE — 96413 CHEMO IV INFUSION 1 HR: CPT

## 2018-06-13 PROCEDURE — 74011000258 HC RX REV CODE- 258: Performed by: OBSTETRICS & GYNECOLOGY

## 2018-06-13 PROCEDURE — 74011250636 HC RX REV CODE- 250/636: Performed by: OBSTETRICS & GYNECOLOGY

## 2018-06-13 PROCEDURE — 74011000250 HC RX REV CODE- 250: Performed by: OBSTETRICS & GYNECOLOGY

## 2018-06-13 PROCEDURE — 96415 CHEMO IV INFUSION ADDL HR: CPT

## 2018-06-13 PROCEDURE — 96417 CHEMO IV INFUS EACH ADDL SEQ: CPT

## 2018-06-13 PROCEDURE — 96367 TX/PROPH/DG ADDL SEQ IV INF: CPT

## 2018-06-13 PROCEDURE — 74011250636 HC RX REV CODE- 250/636: Performed by: NURSE PRACTITIONER

## 2018-06-13 PROCEDURE — 77030012965 HC NDL HUBR BBMI -A

## 2018-06-13 PROCEDURE — 96375 TX/PRO/DX INJ NEW DRUG ADDON: CPT

## 2018-06-13 RX ORDER — SODIUM CHLORIDE 9 MG/ML
10 INJECTION INTRAMUSCULAR; INTRAVENOUS; SUBCUTANEOUS AS NEEDED
Status: ACTIVE | OUTPATIENT
Start: 2018-06-13 | End: 2018-06-14

## 2018-06-13 RX ORDER — HEPARIN 100 UNIT/ML
500 SYRINGE INTRAVENOUS AS NEEDED
Status: ACTIVE | OUTPATIENT
Start: 2018-06-13 | End: 2018-06-14

## 2018-06-13 RX ORDER — SODIUM CHLORIDE 0.9 % (FLUSH) 0.9 %
10 SYRINGE (ML) INJECTION AS NEEDED
Status: ACTIVE | OUTPATIENT
Start: 2018-06-13 | End: 2018-06-14

## 2018-06-13 RX ADMIN — CARBOPLATIN 570 MG: 10 INJECTION, SOLUTION INTRAVENOUS at 15:11

## 2018-06-13 RX ADMIN — Medication 10 ML: at 15:50

## 2018-06-13 RX ADMIN — SODIUM CHLORIDE 1000 ML: 900 INJECTION, SOLUTION INTRAVENOUS at 10:24

## 2018-06-13 RX ADMIN — GRANISETRON HYDROCHLORIDE 1 MG: 1 INJECTION INTRAVENOUS at 10:13

## 2018-06-13 RX ADMIN — FAMOTIDINE 20 MG: 10 INJECTION, SOLUTION INTRAVENOUS at 10:13

## 2018-06-13 RX ADMIN — SODIUM CHLORIDE 150 MG: 900 INJECTION, SOLUTION INTRAVENOUS at 11:01

## 2018-06-13 RX ADMIN — DEXAMETHASONE SODIUM PHOSPHATE 12 MG: 4 INJECTION, SOLUTION INTRAMUSCULAR; INTRAVENOUS at 10:41

## 2018-06-13 RX ADMIN — SODIUM CHLORIDE, PRESERVATIVE FREE 500 UNITS: 5 INJECTION INTRAVENOUS at 15:55

## 2018-06-13 RX ADMIN — PACLITAXEL 410 MG: 6 INJECTION, SOLUTION INTRAVENOUS at 12:02

## 2018-06-13 RX ADMIN — DIPHENHYDRAMINE HYDROCHLORIDE 50 MG: 50 INJECTION INTRAMUSCULAR; INTRAVENOUS at 10:14

## 2018-06-13 RX ADMIN — SODIUM CHLORIDE 10 ML: 9 INJECTION INTRAMUSCULAR; INTRAVENOUS; SUBCUTANEOUS at 10:10

## 2018-06-13 NOTE — PROGRESS NOTES
Raphael Conklin. MARISA Coello      Date of visit: 6/13/2018   HPI:  Luz Iglesias is a 61 y.o.  postmenopausal female who was referred by Dr. Ning Plummer. after pt presented with postmenopausal  Bleeding. A pelvic US revealed a thickend endometrium and her pap smear was AGCUS. She then underwent a colposcopy with cervical bx, ECC and EMB. The cervical biopsy and ECC were negative.  The EMB revealed carcinosarcoma of the endometrium. CT of chest/abd/pelvis showed uterine disease, no other visible evidence of metastatic disease. She underwent robotic hysterectomy with staging on January 31, 2018. Pathology revealed LVSI but her sentinel nodes were negative. Pelvic washings also negative. She has a diagnosis of stage IA uterine carcinosarcoma and Dr. Eric Finney recommended adjuvant chemotherapy with Taxol/Carboplatin due to her risk of recurrence    Onc History:  1/31/2018: Robotically assisted TLH/BSO/Left [place] PLND/Right pelvic node sampling  3/16/2018: Placement of port  3/21/2018: Began planned 6 cycles of Carbo/Taxol Q 21 days    Subjective:   Pt presents today prior to her 5th cycle of Carbo/Taxol  She is in good spirits, but says she feels \"run down\"  She says she has been trying to drink her required water, but thinks she has been off a bit this week. Pt tripped on stairs 2 weeks ago and broke her toe on her right foot (her weak side) and is now in a boot given to her by othro. She is using her cane to assist her stability      Current Outpatient Prescriptions   Medication Sig    amLODIPine (NORVASC) 5 mg tablet     SENNA-S 8.6-50 mg per tablet TK 1 T PO BID PRN    LOW-DOSE ASPIRIN PO Take  by mouth.  oxyCODONE-acetaminophen (PERCOCET) 5-325 mg per tablet Take 1 Tab by mouth every four (4) hours as needed for Pain. Max Daily Amount: 6 Tabs.     dexamethasone (DECADRON) 4 mg tablet Take 2 tablets with breakfast the day before chemo and for 2 days after chemo    lidocaine-prilocaine (EMLA) topical cream Apply small amount over port area one hour before chemo treatment and cover with a Band-Aid    ondansetron (ZOFRAN ODT) 4 mg disintegrating tablet Take 1 Tab by mouth every eight (8) hours as needed for Nausea. Indications: CANCER CHEMOTHERAPY-INDUCED NAUSEA AND VOMITING    amLODIPine (NORVASC) 10 mg tablet Take 10 mg by mouth daily. Indications: pt states taking 1/2 tablet daily    meclizine (ANTIVERT) 25 mg tablet Take 25 mg by mouth as needed.  traMADol (ULTRAM) 50 mg tablet Take 50 mg by mouth nightly.  aspirin (ASPIRIN) 325 mg tablet Take 325 mg by mouth daily.  atorvastatin (LIPITOR) 10 mg tablet Take 10 mg by mouth daily.  clopidogrel (PLAVIX) 75 mg tablet Take 75 mg by mouth daily.  gabapentin (NEURONTIN) 300 mg capsule Take 300 mg by mouth.  metoprolol (LOPRESSOR) 50 mg tablet Take 50 mg by mouth two (2) times a day.  oxybutynin (DITROPAN) 5 mg tablet Take 15 mg by mouth two (2) times a day. Current Facility-Administered Medications   Medication Dose Route Frequency    sodium chloride (NS) flush 10 mL  10 mL IntraVENous PRN    sodium chloride 0.9% injection 10 mL  10 mL IntraVENous PRN    heparin (porcine) pf 500 Units  500 Units IntraVENous PRN    CARBOplatin (PARAPLATIN) 570 mg in 0.9% sodium chloride 250 mL, overfill volume 25 mL chemo infusion  570 mg IntraVENous ONCE    PACLitaxel (TAXOL) 410 mg in 0.9% sodium chloride 250 mL, overfill volume 25 mL chemo infusion  410 mg IntraVENous ONCE        Review of Systems:  General: Continues trying to eat more healthy diet. Continues with a lot of fatigue since her stroke, but says this past week, it has been a little worse. HEENT: Denies visual changes, dysphagia or headache  Resp: Denies SOB, PRIETO, wheezing or cough  CV: Denies CP, palpitations  GI/:Constipation controlled with [place].  .  Denies N/V, bloating, diarrhea, or dysuria  MuskSkel:Acknowledges persistent difficulty with right sided weakness, drop foot and hand grasp. Says she continues to work with her physical therapy. Broken toe as in HPI  Neuro: Denies neuropathy, dizzyness or syncope  Psych: Denies depression or feelings of sadness  Integumentary:improved ecchymosis. .      Objective:     Patient Vitals for the past 8 hrs:   BP Temp Pulse Resp SpO2 Height Weight   06/13/18 0959 118/69 97.4 °F (36.3 °C) 89 18 99 % - -   06/13/18 0940 - - - - - 5' 8.5\" (1.74 m) 234 lb (106.1 kg)       Physical Exam:  General: A&O X3 in NAD  HEENT: Sclera anicteric, pale. Mucosa pale, moist without lesions. Pupils equal and reactive to light. Neck: No JVD or cervical adenopathy appreciated. Port Site: without redness, swelling or tenderness. Heart: Regular without M/R/G  Lungs: CTA Bilat without wheezing or rales. No cough noted  Abd: Soft, obese, NT/ND with + BS throughout  Ext: Without edema and + pedal pulses bilat. Right lower ext remains weak. Right foot in boot. Toes warm and freely movable. Right arm somewhat retracted, unchanged, but pt uses it as much as possible. Neuro: grossly intact        Assessment:     Patient Active Problem List   Diagnosis Code    Hypertension I10    Chronic lung disease J98.4    Stroke ([facility] I63.9    Uterine carcinosarcoma ([facility] C55    Obesity (BMI 30.0-34. 9) E66.9    Elevated serum creatinine R79.89    Severe obesity (BMI 35.0-39. 9) with comorbidity ([facility] E66.01    Anemia D64.9         Plan: Will  proceed with C5 today  She is scheduled for transfusion of 2UPRBC's on Monday 6/18 and says she has transportation arranged for this. Discussed Risk/Benifits and consent obtained  She will continue her Plavix, and antihypertensives and follow with PCP. We will monitor Plt's  Monitor plt's (improved today) and bruising.   Zofran PRN available to pt  Will hydrated with extra liter of NS today for creat  Senna daily with Miralax PRN  Hydration importance and rational discussed and encouraged  Encouraged to call for any concerns or questions  Greater than 45 minutes spent with pt with greater then 50% in face to face counseling  aTyler Wahl NP

## 2018-06-14 NOTE — PROGRESS NOTES
[facility] Progress Note    Date: 2018    Name: Norberto Azul    MRN: [id number]         : 1955    Ms. Zohaib Mejia Arrived ambulatory and in no distress for C5 of Taxol/Carbo regimen. Assessment was completed, no acute issues at this time, no new complaints voiced. LCW port accessed without difficulty, labs drawn and in process. Chemotherapy Flowsheet 2018   Cycle C5   Date 2018   Drug / Regimen Taxol/Carbo   Pre Hydration given   Post Hydration given   Pre Meds given   Notes -       Ms. Romeo's vitals were reviewed. Visit Vitals    /72 (BP 1 Location: Left arm, BP Patient Position: At rest;Sitting)    Pulse 78    Temp 98 °F (36.7 °C)    Resp 18    Ht 5' 8.5\" (1.74 m)    Wt 106.1 kg (234 lb)    SpO2 99%    BMI 35.06 kg/m2       Lab results were obtained and reviewed. Medications:  Diphenhydramine  Famotidine  Granisetron  Dexamethasone  Emend  Taxol  Carbo       Ms. Romeo tolerated treatment well and was discharged from [place] [address] in stable condition at 1600.  She is to return on 18 @ [address] [place] [address]  2018

## 2018-06-15 ENCOUNTER — HOSPITAL ENCOUNTER (OUTPATIENT)
Dept: INFUSION THERAPY | Age: 63
Discharge: HOME | End: 2018-06-15
Payer: MEDICARE

## 2018-06-15 VITALS
DIASTOLIC BLOOD PRESSURE: 97 MMHG | SYSTOLIC BLOOD PRESSURE: 156 MMHG | RESPIRATION RATE: 18 BRPM | TEMPERATURE: 98.5 F | HEART RATE: 77 BPM

## 2018-06-15 PROCEDURE — 86920 COMPATIBILITY TEST SPIN: CPT | Performed by: OBSTETRICS & GYNECOLOGY

## 2018-06-15 PROCEDURE — 86900 BLOOD TYPING SEROLOGIC ABO: CPT | Performed by: OBSTETRICS & GYNECOLOGY

## 2018-06-15 PROCEDURE — 36415 COLL VENOUS BLD VENIPUNCTURE: CPT | Performed by: OBSTETRICS & GYNECOLOGY

## 2018-06-15 NOTE — PROGRESS NOTES
[facility] Lab Visit:    1000:  Pt arrived ambulatory and in no distress, labs drawn per Tami Fofana RN. Departed [facility] ambulatory and in no distress. Visit Vitals    BP (!) 156/97 (BP 1 Location: Left arm, BP Patient Position: Sitting)    Pulse 77    Temp 98.5 °F (36.9 °C)    Resp 18    LMP 01/09/2011       Labs available in CC once resulted.

## 2018-06-18 ENCOUNTER — HOSPITAL ENCOUNTER (OUTPATIENT)
Dept: INFUSION THERAPY | Age: 63
Discharge: HOME | End: 2018-06-18
Payer: MEDICARE

## 2018-06-18 VITALS
DIASTOLIC BLOOD PRESSURE: 95 MMHG | SYSTOLIC BLOOD PRESSURE: 141 MMHG | TEMPERATURE: 98.6 F | RESPIRATION RATE: 18 BRPM | HEART RATE: 108 BPM

## 2018-06-18 PROCEDURE — 77030012965 HC NDL HUBR BBMI -A

## 2018-06-18 PROCEDURE — 36430 TRANSFUSION BLD/BLD COMPNT: CPT

## 2018-06-18 PROCEDURE — 74011250636 HC RX REV CODE- 250/636: Performed by: OBSTETRICS & GYNECOLOGY

## 2018-06-18 PROCEDURE — P9016 RBC LEUKOCYTES REDUCED: HCPCS | Performed by: OBSTETRICS & GYNECOLOGY

## 2018-06-18 PROCEDURE — 74011000250 HC RX REV CODE- 250: Performed by: OBSTETRICS & GYNECOLOGY

## 2018-06-18 RX ORDER — SODIUM CHLORIDE 9 MG/ML
10 INJECTION INTRAMUSCULAR; INTRAVENOUS; SUBCUTANEOUS AS NEEDED
Status: ACTIVE | OUTPATIENT
Start: 2018-06-18 | End: 2018-06-19

## 2018-06-18 RX ORDER — SODIUM CHLORIDE 0.9 % (FLUSH) 0.9 %
5-10 SYRINGE (ML) INJECTION AS NEEDED
Status: ACTIVE | OUTPATIENT
Start: 2018-06-18 | End: 2018-06-19

## 2018-06-18 RX ORDER — HEPARIN 100 UNIT/ML
500 SYRINGE INTRAVENOUS AS NEEDED
Status: ACTIVE | OUTPATIENT
Start: 2018-06-18 | End: 2018-06-19

## 2018-06-18 RX ORDER — SODIUM CHLORIDE 9 MG/ML
250 INJECTION, SOLUTION INTRAVENOUS AS NEEDED
Status: DISPENSED | OUTPATIENT
Start: 2018-06-18 | End: 2018-06-19

## 2018-06-18 RX ADMIN — SODIUM CHLORIDE 250 ML: 900 INJECTION, SOLUTION INTRAVENOUS at 12:35

## 2018-06-18 RX ADMIN — SODIUM CHLORIDE 10 ML: 9 INJECTION, SOLUTION INTRAMUSCULAR; INTRAVENOUS; SUBCUTANEOUS at 19:01

## 2018-06-18 RX ADMIN — SODIUM CHLORIDE 250 ML: 900 INJECTION, SOLUTION INTRAVENOUS at 09:30

## 2018-06-18 RX ADMIN — SODIUM CHLORIDE 10 ML: 9 INJECTION INTRAMUSCULAR; INTRAVENOUS; SUBCUTANEOUS at 19:01

## 2018-06-18 RX ADMIN — SODIUM CHLORIDE, PRESERVATIVE FREE 500 UNITS: 5 INJECTION INTRAVENOUS at 19:01

## 2018-06-18 NOTE — PROGRESS NOTES
[facility] Note:  [id number] arrived at [facility] ambulatory with cane and foot brace. and in no distress for 2 units prbcs    Assessment stable. Pt easily SOB, tachy. Some disorientation The patient has a history of stroke. Cancer/O hand cramp and nausea x1. Reorts not eating well. Took her home meds late morning. Pt has elevated temperature but she is wearing hat, 3 blankets and a hand warmer. We talked about how the blood may cause a temp reaction and when to call the doctor. Gave her a thermometer. Reviewed transfusion process. Transportation is at 1600. Medications received:  None    1530 Tolerated treatment well, no adverse reaction noted. D/Cd from [facility] ambulatory and in no distress accompanied by self She declined post transfusion observation. She has transportation arranged. Next appt 7/3  1100  Visit Vitals    /81 (BP 1 Location: Left arm, BP Patient Position: Sitting)    Pulse (!) 119    Temp 98.9 °F (37.2 °C)    Resp 18    LMP 01/09/2011     No results found for this or any previous visit (from the past 12 hour(s)).

## 2018-06-18 NOTE — PROGRESS NOTES
Problem: Patient Education:  Go to Education Activity  Goal: Patient/Family Education  Outcome: Progressing Towards Goal  Transfusion DC instructions

## 2018-06-19 LAB
ABO + RH BLD: NORMAL
BLD PROD TYP BPU: NORMAL
BLD PROD TYP BPU: NORMAL
BLOOD GROUP ANTIBODIES SERPL: NORMAL
BPU ID: NORMAL
BPU ID: NORMAL
CROSSMATCH RESULT,%XM: NORMAL
CROSSMATCH RESULT,%XM: NORMAL
SPECIMEN EXP DATE BLD: NORMAL
STATUS OF UNIT,%ST: NORMAL
STATUS OF UNIT,%ST: NORMAL
UNIT DIVISION, %UDIV: 0
UNIT DIVISION, %UDIV: 0

## 2018-06-27 ENCOUNTER — TELEPHONE (OUTPATIENT)
Dept: GYNECOLOGY | Age: 63
End: 2018-06-27

## 2018-06-27 NOTE — TELEPHONE ENCOUNTER
The pt states she had a blood transfusion a week ago and her fingertips/toes are cold now.  She is calling to see if this is normal.

## 2018-06-28 NOTE — TELEPHONE ENCOUNTER
I called pt back and she is not having any other problems, but feeling some numbness and tingling in fingertips and toes. I reviewed neuropathy, a se of her chemotherapy and advised her to be careful with walking d/t not having complete feeling in her feet, and to call if it gets worse, and to let Dr. Merlinda Baller know about this se with next pre chemo appt.

## 2018-07-02 RX ORDER — GRANISETRON HYDROCHLORIDE 1 MG/ML
1 INJECTION INTRAVENOUS ONCE
Status: COMPLETED | OUTPATIENT
Start: 2018-07-05 | End: 2018-07-05

## 2018-07-02 RX ORDER — DIPHENHYDRAMINE HYDROCHLORIDE 50 MG/ML
50 INJECTION, SOLUTION INTRAMUSCULAR; INTRAVENOUS ONCE
Status: COMPLETED | OUTPATIENT
Start: 2018-07-05 | End: 2018-07-05

## 2018-07-03 ENCOUNTER — HOSPITAL ENCOUNTER (OUTPATIENT)
Dept: INFUSION THERAPY | Age: 63
Discharge: HOME | End: 2018-07-03
Payer: MEDICARE

## 2018-07-03 ENCOUNTER — OFFICE VISIT (OUTPATIENT)
Dept: GYNECOLOGY | Age: 63
End: 2018-07-03

## 2018-07-03 VITALS
HEART RATE: 83 BPM | BODY MASS INDEX: 32.79 KG/M2 | DIASTOLIC BLOOD PRESSURE: 80 MMHG | HEIGHT: 69 IN | SYSTOLIC BLOOD PRESSURE: 130 MMHG | WEIGHT: 221.4 LBS

## 2018-07-03 DIAGNOSIS — C55 UTERINE CARCINOSARCOMA (HCC): Primary | ICD-10-CM

## 2018-07-03 LAB
ALBUMIN SERPL-MCNC: 3.5 G/DL (ref 3.5–5)
ALBUMIN/GLOB SERPL: 0.8 {RATIO} (ref 1.1–2.2)
ALP SERPL-CCNC: 107 U/L (ref 45–117)
ALT SERPL-CCNC: 23 U/L (ref 12–78)
ANION GAP SERPL CALC-SCNC: 8 MMOL/L (ref 5–15)
AST SERPL-CCNC: 18 U/L (ref 15–37)
BASOPHILS # BLD: 0 K/UL (ref 0–0.1)
BASOPHILS NFR BLD: 0 % (ref 0–1)
BILIRUB SERPL-MCNC: 0.6 MG/DL (ref 0.2–1)
BUN SERPL-MCNC: 26 MG/DL (ref 6–20)
BUN/CREAT SERPL: 17 (ref 12–20)
CALCIUM SERPL-MCNC: 9 MG/DL (ref 8.5–10.1)
CANCER AG125 SERPL-ACNC: 20 U/ML (ref 1.5–35)
CHLORIDE SERPL-SCNC: 108 MMOL/L (ref 97–108)
CO2 SERPL-SCNC: 24 MMOL/L (ref 21–32)
CREAT SERPL-MCNC: 1.5 MG/DL (ref 0.55–1.02)
DIFFERENTIAL METHOD BLD: ABNORMAL
EOSINOPHIL # BLD: 0 K/UL (ref 0–0.4)
EOSINOPHIL NFR BLD: 0 % (ref 0–7)
ERYTHROCYTE [DISTWIDTH] IN BLOOD BY AUTOMATED COUNT: 19.4 % (ref 11.5–14.5)
GLOBULIN SER CALC-MCNC: 4.3 G/DL (ref 2–4)
GLUCOSE SERPL-MCNC: 118 MG/DL (ref 65–100)
HCT VFR BLD AUTO: 24.8 % (ref 35–47)
HGB BLD-MCNC: 8 G/DL (ref 11.5–16)
IMM GRANULOCYTES # BLD: 0 K/UL (ref 0–0.04)
IMM GRANULOCYTES NFR BLD AUTO: 0 % (ref 0–0.5)
LYMPHOCYTES # BLD: 0.6 K/UL (ref 0.8–3.5)
LYMPHOCYTES NFR BLD: 12 % (ref 12–49)
MAGNESIUM SERPL-MCNC: 2.1 MG/DL (ref 1.6–2.4)
MCH RBC QN AUTO: 32.7 PG (ref 26–34)
MCHC RBC AUTO-ENTMCNC: 32.3 G/DL (ref 30–36.5)
MCV RBC AUTO: 101.2 FL (ref 80–99)
MONOCYTES # BLD: 0.2 K/UL (ref 0–1)
MONOCYTES NFR BLD: 4 % (ref 5–13)
NEUTS SEG # BLD: 3.8 K/UL (ref 1.8–8)
NEUTS SEG NFR BLD: 84 % (ref 32–75)
NRBC # BLD: 0.06 K/UL (ref 0–0.01)
NRBC BLD-RTO: 1.3 PER 100 WBC
PLATELET # BLD AUTO: 127 K/UL (ref 150–400)
PMV BLD AUTO: 11.1 FL (ref 8.9–12.9)
POTASSIUM SERPL-SCNC: 4.1 MMOL/L (ref 3.5–5.1)
PROT SERPL-MCNC: 7.8 G/DL (ref 6.4–8.2)
RBC # BLD AUTO: 2.45 M/UL (ref 3.8–5.2)
RBC MORPH BLD: ABNORMAL
SODIUM SERPL-SCNC: 140 MMOL/L (ref 136–145)
WBC # BLD AUTO: 4.6 K/UL (ref 3.6–11)

## 2018-07-03 PROCEDURE — 36415 COLL VENOUS BLD VENIPUNCTURE: CPT | Performed by: OBSTETRICS & GYNECOLOGY

## 2018-07-03 PROCEDURE — 85025 COMPLETE CBC W/AUTO DIFF WBC: CPT | Performed by: OBSTETRICS & GYNECOLOGY

## 2018-07-03 PROCEDURE — 77030012965 HC NDL HUBR BBMI -A

## 2018-07-03 PROCEDURE — 86304 IMMUNOASSAY TUMOR CA 125: CPT | Performed by: OBSTETRICS & GYNECOLOGY

## 2018-07-03 PROCEDURE — 36591 DRAW BLOOD OFF VENOUS DEVICE: CPT

## 2018-07-03 PROCEDURE — 83735 ASSAY OF MAGNESIUM: CPT | Performed by: OBSTETRICS & GYNECOLOGY

## 2018-07-03 PROCEDURE — 80053 COMPREHEN METABOLIC PANEL: CPT | Performed by: OBSTETRICS & GYNECOLOGY

## 2018-07-03 RX ORDER — SODIUM CHLORIDE 0.9 % (FLUSH) 0.9 %
10-40 SYRINGE (ML) INJECTION AS NEEDED
Status: ACTIVE | OUTPATIENT
Start: 2018-07-03 | End: 2018-07-04

## 2018-07-03 RX ORDER — HEPARIN 100 UNIT/ML
500 SYRINGE INTRAVENOUS AS NEEDED
Status: ACTIVE | OUTPATIENT
Start: 2018-07-03 | End: 2018-07-04

## 2018-07-03 RX ORDER — SODIUM CHLORIDE 9 MG/ML
10 INJECTION INTRAMUSCULAR; INTRAVENOUS; SUBCUTANEOUS AS NEEDED
Status: ACTIVE | OUTPATIENT
Start: 2018-07-03 | End: 2018-07-04

## 2018-07-03 NOTE — PROGRESS NOTES
1200 Pt admit to [facility] for Labs ambulatory in stable condition. Assessment completed. No new concerns voiced. Port accessed and flushed with positive blood return. Labs drawn per order and sent for processing. Visit Vitals    BP (P) 147/72    Pulse (P) 83    Temp (P) 98 °F (36.7 °C)    Resp (P) 18    LMP 01/09/2011         1210 Pt tolerated treatment well. D/c home ambulatory in no distress. Pt aware of next [facility] appointment scheduled for 7/5/18    Recent Results (from the past 12 hour(s))   CBC WITH AUTOMATED DIFF    Collection Time: 07/03/18 12:05 PM   Result Value Ref Range    WBC 4.6 3.6 - 11.0 K/uL    RBC 2.45 (L) 3.80 - 5.20 M/uL    HGB 8.0 (L) 11.5 - 16.0 g/dL    HCT 24.8 (L) 35.0 - 47.0 %    .2 (H) 80.0 - 99.0 FL    MCH 32.7 26.0 - 34.0 PG    MCHC 32.3 30.0 - 36.5 g/dL    RDW 19.4 (H) 11.5 - 14.5 %    PLATELET 549 (L) 465 - 400 K/uL    MPV 11.1 8.9 - 12.9 FL    NRBC 1.3 (H) 0  WBC    ABSOLUTE NRBC 0.06 (H) 0.00 - 0.01 K/uL    NEUTROPHILS 84 (H) 32 - 75 %    LYMPHOCYTES 12 12 - 49 %    MONOCYTES 4 (L) 5 - 13 %    EOSINOPHILS 0 0 - 7 %    BASOPHILS 0 0 - 1 %    IMMATURE GRANULOCYTES 0 0.0 - 0.5 %    ABS. NEUTROPHILS 3.8 1.8 - 8.0 K/UL    ABS. LYMPHOCYTES 0.6 (L) 0.8 - 3.5 K/UL    ABS. MONOCYTES 0.2 0.0 - 1.0 K/UL    ABS. EOSINOPHILS 0.0 0.0 - 0.4 K/UL    ABS. BASOPHILS 0.0 0.0 - 0.1 K/UL    ABS. IMM.  GRANS. 0.0 0.00 - 0.04 K/UL    DF SMEAR SCANNED      RBC COMMENTS ANISOCYTOSIS  1+        RBC COMMENTS MACROCYTOSIS  1+        RBC COMMENTS POLYCHROMASIA  1+        RBC COMMENTS OVALOCYTES  1+        RBC COMMENTS SCHISTOCYTES  1+       METABOLIC PANEL, COMPREHENSIVE    Collection Time: 07/03/18 12:05 PM   Result Value Ref Range    Sodium 140 136 - 145 mmol/L    Potassium 4.1 3.5 - 5.1 mmol/L    Chloride 108 97 - 108 mmol/L    CO2 24 21 - 32 mmol/L    Anion gap 8 5 - 15 mmol/L    Glucose 118 (H) 65 - 100 mg/dL    BUN 26 (H) 6 - 20 MG/DL    Creatinine 1.50 (H) 0.55 - 1.02 MG/DL    BUN/Creatinine ratio 17 12 - 20      GFR est AA 43 (L) >60 ml/min/1.73m2    GFR est non-AA 35 (L) >60 ml/min/1.73m2    Calcium 9.0 8.5 - 10.1 MG/DL    Bilirubin, total 0.6 0.2 - 1.0 MG/DL    ALT (SGPT) 23 12 - 78 U/L    AST (SGOT) 18 15 - 37 U/L    Alk.  phosphatase 107 45 - 117 U/L    Protein, total 7.8 6.4 - 8.2 g/dL    Albumin 3.5 3.5 - 5.0 g/dL    Globulin 4.3 (H) 2.0 - 4.0 g/dL    A-G Ratio 0.8 (L) 1.1 - 2.2     MAGNESIUM    Collection Time: 07/03/18 12:05 PM   Result Value Ref Range    Magnesium 2.1 1.6 - 2.4 mg/dL   CANCER ANTIGEN 125    Collection Time: 07/03/18 12:05 PM   Result Value Ref Range    CA-125 20 1.5 - 35.0 U/mL

## 2018-07-03 NOTE — PROGRESS NOTES
Pre chemo, labs at [facility] today, and for Cc6 taxol/carbo on 7/5. Pt c/o numbness in hands and feet.

## 2018-07-03 NOTE — PROGRESS NOTES
[address] [place] [address] [address], [address]  P [phone]  F [phone]    Office Note  Patient ID:  Name:  Nikki Zhang  MRN:  [id number]  :  1955/63 y.o. Date:  7/3/2018      HISTORY OF PRESENT ILLNESS:  Luz Mcgill is a 61 y.o.  postmenopausal female with a diagnosis of stage IA uterine carcinosarcoma. She underwent robotic hysterectomy with staging in late 2018. Pathology revealed LVSI but her sentinel nodes were negative. Pelvic washings were also negative. She was recommended adjuvant chemotherapy with Taxol/Carboplatin due to her risk of recurrence. She has completed 5 cycles of chemotherapy so far. Other than some constipation she has really tolerated well so far. She reports minimal nausea. No neuropathy symptoms to speak of. ROS:   and GI review:  Negative  Cardiopulmonary review:  Negative   Musculoskeletal:  Negative    A comprehensive review of systems was negative except for that written in the History of Present Illness. , 10 point ROS        Problem List:  Patient Active Problem List    Diagnosis Date Noted    Anemia 2018    Severe obesity (BMI 35.0-39. 9) with comorbidity ([address] 2018    Elevated serum creatinine 2018    Uterine carcinosarcoma ([address] 2018    Obesity (BMI 30.0-34.9) 2018    Hypertension 2017    Chronic lung disease 2017    Stroke ([address] 2017     PMH:  Past Medical History:   Diagnosis Date    Cancer ([address]     UTERINE CA    Chronic pain     Hypercholesterolemia     Hypertension     Stroke ([address] 2007    R SIDE AFFECTED      PSH:  Past Surgical History:   Procedure Laterality Date    COLONOSCOPY,DIAGNOSTIC  2015         HX BREAST BIOPSY Right 1993    right breast:  benign    HX GI      COLONOSCOPY    HX ORTHOPAEDIC      GREAT L TOE BONE SPUR REMOVED      Social History:  Social History   Substance Use Topics    Smoking status: Former Smoker     Quit date: 10/4/1991    Smokeless tobacco: Never Used    Alcohol use No      Family History:  Family History   Problem Relation Age of Onset    Heart Disease Mother     Hypertension Mother     Diabetes Mother     Cancer Mother 62     breast cancer    Breast Cancer Mother      52's    Cancer Father 61     lung cancer    Stroke Brother     Stroke Maternal Grandmother     Stroke Maternal Grandfather     Kidney Disease Brother     Hypertension Brother       Medications: (reviewed)  Current Outpatient Prescriptions   Medication Sig    amLODIPine (NORVASC) 5 mg tablet     SENNA-S 8.6-50 mg per tablet TK 1 T PO BID PRN    LOW-DOSE ASPIRIN PO Take  by mouth.  dexamethasone (DECADRON) 4 mg tablet Take 2 tablets with breakfast the day before chemo and for 2 days after chemo    lidocaine-prilocaine (EMLA) topical cream Apply small amount over port area one hour before chemo treatment and cover with a Band-Aid    ondansetron (ZOFRAN ODT) 4 mg disintegrating tablet Take 1 Tab by mouth every eight (8) hours as needed for Nausea. Indications: CANCER CHEMOTHERAPY-INDUCED NAUSEA AND VOMITING    meclizine (ANTIVERT) 25 mg tablet Take 25 mg by mouth as needed.  traMADol (ULTRAM) 50 mg tablet Take 50 mg by mouth nightly.  aspirin (ASPIRIN) 325 mg tablet Take 325 mg by mouth daily.  atorvastatin (LIPITOR) 10 mg tablet Take 10 mg by mouth daily.  clopidogrel (PLAVIX) 75 mg tablet Take 75 mg by mouth daily.  gabapentin (NEURONTIN) 300 mg capsule Take 300 mg by mouth.  metoprolol (LOPRESSOR) 50 mg tablet Take 50 mg by mouth two (2) times a day.  oxybutynin (DITROPAN) 5 mg tablet Take 15 mg by mouth two (2) times a day.  oxyCODONE-acetaminophen (PERCOCET) 5-325 mg per tablet Take 1 Tab by mouth every four (4) hours as needed for Pain. Max Daily Amount: 6 Tabs.  amLODIPine (NORVASC) 10 mg tablet Take 10 mg by mouth daily.  Indications: pt states taking 1/2 tablet daily No current facility-administered medications for this visit. Facility-Administered Medications Ordered in Other Visits   Medication Dose Route Frequency    [START ON 7/5/2018] CARBOplatin (PARAPLATIN) 570 mg in 0.9% sodium chloride 250 mL, overfill volume 25 mL chemo infusion  570 mg IntraVENous ONCE    [START ON 7/5/2018] diphenhydrAMINE (BENADRYL) injection 50 mg  50 mg IntraVENous ONCE    [START ON 7/5/2018] famotidine (PF) (PEPCID) 20 mg in sodium chloride 0.9% 10 mL injection  20 mg IntraVENous ONCE    [START ON 7/5/2018] granisetron (KYTRIL) injection 1 mg  1 mg IntraVENous ONCE    [START ON 7/5/2018] dexamethasone (DECADRON) 12 mg in dextrose 5% 50 mL IVPB  12 mg IntraVENous ONCE    [START ON 7/5/2018] fosaprepitant (EMEND) 150 mg in 0.9% sodium chloride 150 mL IVPB  150 mg IntraVENous ONCE    [START ON 7/5/2018] PACLitaxel (TAXOL) 410 mg in 0.9% sodium chloride 250 mL, overfill volume 25 mL chemo infusion  410 mg IntraVENous ONCE     Allergies: (reviewed)  Allergies   Allergen Reactions    Latex Itching    Pcn [Penicillins] Unknown (comments)     Pt's states she doesn't know reaction. OBJECTIVE:    Physical Exam:  VITAL SIGNS: Vitals:    07/03/18 1113   BP: 130/80   Pulse: 83   Weight: 221 lb 6.4 oz (100.4 kg)   Height: 5' 8.5\" (1.74 m)     Body mass index is 33.17 kg/(m^2). GENERAL MELVIN: Conversant, alert, oriented. No acute distress. HEENT: HEENT. No thyroid enlargement. No JVD. Neck: Supple without restrictions. RESPIRATORY: Clear to auscultation and percussion to the bases. No CVAT. CARDIOVASC: RRR without murmur/rub. GASTROINT: soft, non-tender, without masses or organomegaly   MUSCULOSKEL: no joint tenderness, deformity or swelling   EXTREMITIES: extremities normal, atraumatic, no cyanosis or edema   PELVIC: Deferred   RECTAL: Deferred   JOHN SURVEY: No suspicious lymphadenopathy or edema noted. NEURO: Grossly intact. No acute deficit.          Lab Results Component Value Date/Time    WBC 6.6 06/12/2018 11:07 AM    HGB 7.6 (L) 06/12/2018 11:07 AM    HCT 24.2 (L) 06/12/2018 11:07 AM    PLATELET 520 86/27/3575 11:07 AM    .3 (H) 06/12/2018 11:07 AM     Lab Results   Component Value Date/Time    Sodium 141 06/12/2018 11:07 AM    Potassium 3.8 06/12/2018 11:07 AM    Chloride 109 (H) 06/12/2018 11:07 AM    CO2 22 06/12/2018 11:07 AM    Anion gap 10 06/12/2018 11:07 AM    Glucose 86 06/12/2018 11:07 AM    BUN 19 06/12/2018 11:07 AM    Creatinine 1.36 (H) 06/12/2018 11:07 AM    BUN/Creatinine ratio 14 06/12/2018 11:07 AM    GFR est AA 48 (L) 06/12/2018 11:07 AM    GFR est non-AA 39 (L) 06/12/2018 11:07 AM    Calcium 9.0 06/12/2018 11:07 AM     Lab Results   Component Value Date/Time    CA-125 23 06/12/2018 11:07 AM    Cancer Ag (CA) 125 27.0 05/21/2018 11:04 AM       IMPRESSION/PLAN:  Luz Flores is a 61 y.o. female with a diagnosis of stage IA uterine carcinosarcoma. She is currently receiving Taxol/Carboplatin chemotherapy. She has completed 5 cycles so far. She is tolerating well. We will continue with the current regimen. We will obtain a PET after the next cycle. We will then discuss possible radiation therapy.       Signed By: Endy Drew MD     7/3/2018/12:10 PM

## 2018-07-05 ENCOUNTER — HOSPITAL ENCOUNTER (OUTPATIENT)
Dept: INFUSION THERAPY | Age: 63
Discharge: HOME | End: 2018-07-05
Payer: MEDICARE

## 2018-07-05 VITALS
TEMPERATURE: 99.8 F | BODY MASS INDEX: 33 KG/M2 | HEIGHT: 69 IN | RESPIRATION RATE: 18 BRPM | SYSTOLIC BLOOD PRESSURE: 134 MMHG | WEIGHT: 222.8 LBS | DIASTOLIC BLOOD PRESSURE: 82 MMHG | HEART RATE: 88 BPM

## 2018-07-05 PROCEDURE — 74011250636 HC RX REV CODE- 250/636: Performed by: OBSTETRICS & GYNECOLOGY

## 2018-07-05 PROCEDURE — 86923 COMPATIBILITY TEST ELECTRIC: CPT | Performed by: PHYSICIAN ASSISTANT

## 2018-07-05 PROCEDURE — 86900 BLOOD TYPING SEROLOGIC ABO: CPT | Performed by: PHYSICIAN ASSISTANT

## 2018-07-05 PROCEDURE — 74011250636 HC RX REV CODE- 250/636: Performed by: PHYSICIAN ASSISTANT

## 2018-07-05 PROCEDURE — 96367 TX/PROPH/DG ADDL SEQ IV INF: CPT

## 2018-07-05 PROCEDURE — 96361 HYDRATE IV INFUSION ADD-ON: CPT

## 2018-07-05 PROCEDURE — 74011000258 HC RX REV CODE- 258: Performed by: OBSTETRICS & GYNECOLOGY

## 2018-07-05 PROCEDURE — 96375 TX/PRO/DX INJ NEW DRUG ADDON: CPT

## 2018-07-05 PROCEDURE — 96413 CHEMO IV INFUSION 1 HR: CPT

## 2018-07-05 PROCEDURE — 96415 CHEMO IV INFUSION ADDL HR: CPT

## 2018-07-05 PROCEDURE — 96417 CHEMO IV INFUS EACH ADDL SEQ: CPT

## 2018-07-05 PROCEDURE — 36415 COLL VENOUS BLD VENIPUNCTURE: CPT | Performed by: PHYSICIAN ASSISTANT

## 2018-07-05 PROCEDURE — 77030012965 HC NDL HUBR BBMI -A

## 2018-07-05 PROCEDURE — 74011000250 HC RX REV CODE- 250: Performed by: OBSTETRICS & GYNECOLOGY

## 2018-07-05 RX ORDER — HEPARIN 100 UNIT/ML
500 SYRINGE INTRAVENOUS AS NEEDED
Status: ACTIVE | OUTPATIENT
Start: 2018-07-05 | End: 2018-07-06

## 2018-07-05 RX ORDER — SODIUM CHLORIDE 9 MG/ML
250 INJECTION, SOLUTION INTRAVENOUS AS NEEDED
Status: DISCONTINUED | OUTPATIENT
Start: 2018-07-05 | End: 2018-07-09 | Stop reason: HOSPADM

## 2018-07-05 RX ORDER — SODIUM CHLORIDE 9 MG/ML
50 INJECTION, SOLUTION INTRAVENOUS AS NEEDED
Status: DISPENSED | OUTPATIENT
Start: 2018-07-05 | End: 2018-07-06

## 2018-07-05 RX ORDER — SODIUM CHLORIDE 0.9 % (FLUSH) 0.9 %
5-10 SYRINGE (ML) INJECTION AS NEEDED
Status: ACTIVE | OUTPATIENT
Start: 2018-07-05 | End: 2018-07-06

## 2018-07-05 RX ORDER — DEXAMETHASONE 4 MG/1
TABLET ORAL
Qty: 2 TAB | Refills: 2 | Status: SHIPPED | OUTPATIENT
Start: 2018-07-05 | End: 2019-01-31 | Stop reason: ALTCHOICE

## 2018-07-05 RX ORDER — SODIUM CHLORIDE 9 MG/ML
10 INJECTION INTRAMUSCULAR; INTRAVENOUS; SUBCUTANEOUS AS NEEDED
Status: ACTIVE | OUTPATIENT
Start: 2018-07-05 | End: 2018-07-06

## 2018-07-05 RX ADMIN — SODIUM CHLORIDE, PRESERVATIVE FREE 500 UNITS: 5 INJECTION INTRAVENOUS at 17:10

## 2018-07-05 RX ADMIN — SODIUM CHLORIDE 1000 ML: 900 INJECTION, SOLUTION INTRAVENOUS at 10:21

## 2018-07-05 RX ADMIN — SODIUM CHLORIDE 10 ML: 9 INJECTION INTRAMUSCULAR; INTRAVENOUS; SUBCUTANEOUS at 09:41

## 2018-07-05 RX ADMIN — DIPHENHYDRAMINE HYDROCHLORIDE 50 MG: 50 INJECTION INTRAMUSCULAR; INTRAVENOUS at 11:21

## 2018-07-05 RX ADMIN — SODIUM CHLORIDE 50 ML/HR: 900 INJECTION, SOLUTION INTRAVENOUS at 09:47

## 2018-07-05 RX ADMIN — PACLITAXEL 410 MG: 6 INJECTION, SOLUTION INTRAVENOUS at 12:41

## 2018-07-05 RX ADMIN — GRANISETRON HYDROCHLORIDE 1 MG: 1 INJECTION INTRAVENOUS at 11:20

## 2018-07-05 RX ADMIN — DEXAMETHASONE SODIUM PHOSPHATE 12 MG: 4 INJECTION, SOLUTION INTRAMUSCULAR; INTRAVENOUS at 11:49

## 2018-07-05 RX ADMIN — CARBOPLATIN 570 MG: 10 INJECTION, SOLUTION INTRAVENOUS at 15:48

## 2018-07-05 RX ADMIN — FAMOTIDINE 20 MG: 10 INJECTION, SOLUTION INTRAVENOUS at 11:18

## 2018-07-05 RX ADMIN — SODIUM CHLORIDE 150 MG: 900 INJECTION, SOLUTION INTRAVENOUS at 12:09

## 2018-07-05 RX ADMIN — Medication 10 ML: at 17:09

## 2018-07-05 NOTE — PROGRESS NOTES
Outpatient Infusion Center - Chemotherapy Progress Note    2061 Pt admit to [facility] for Carbo/Taxol. Chemotherapy Flowsheet 7/5/2018   Cycle C6   Date 7/5/2018   Drug / Regimen Taxol/Carbo   Pre Hydration given   Post Hydration -   Pre Meds given   Notes given/1 Liter of pre-hydration ordered     Pt ambulatory with cane and in stable condition. Assessment completed. No new concerns voiced. Port accessed with positive blood return. Labs drawn at previous appointment. [facility] Spoke with [facility] in reference to pt's elevated BUN/Creatnine. Advised to give pt 1 Liter of NS prior to chemo. Visit Vitals    /82 (BP 1 Location: Left arm, BP Patient Position: Sitting)    Pulse 88    Temp 99.8 °F (37.7 °C)    Resp 18    Ht 5' 8.5\" (1.74 m)    Wt 101.1 kg (222 lb 12.8 oz)    LMP 01/09/2011    Breastfeeding No    BMI 33.38 kg/m2     Medications:  NS 1L Bolus  NS at KVO  Pepcid 20 MG IVP  Benadryl 50 MG IVP  Kytril 1 MG IVP  Decadron 12 MG IVP  Emend   Paclitaxel  Carboplatin    1710 Pt tolerated treatment well. Labs drawn for blood transfusion scheduled for tomorrow per [facility]. Port maintained positive blood return throughout treatment, flushed with positive blood return at conclusion and de-accessed at pt's request. D/c home ambulatory in no distress. Pt aware of next [facility] appointment scheduled for 07/06/18 at 0800.

## 2018-07-05 NOTE — PROGRESS NOTES
Gyn Onc        Date of visit: 7/5/2018   HPI:  Luz Randolph is a 61 y.o.  postmenopausal female who was referred by Dr. Cathy Antony who presented with postmenopausal  Bleeding. A pelvic US revealed a thickend endometrium and her pap smear was AGCUS. She then underwent a colposcopy with cervical bx, ECC and EMB. The cervical biopsy and ECC were negative.  The EMB revealed carcinosarcoma of the endometrium. CT of chest/abd/pelvis showed uterine disease, no other visible evidence of metastatic disease. She underwent robotic hysterectomy with staging on January 31, 2018. Pathology revealed LVSI but her sentinel nodes were negative. Pelvic washings also negative. She has a diagnosis of stage IA uterine carcinosarcoma and Dr. Karen Orellana recommended adjuvant chemotherapy with Taxol/Carboplatin due to her risk of recurrence    Onc History:  1/31/2018: Robotically assisted TLH/BSO/Left [place] PLND/Right pelvic node sampling  3/16/2018: Placement of port  3/21/2018: Began planned 6 cycles of Carbo/Taxol Q 21 days    Subjective:   Pt presents today prior to her 6th cycle of Carbo/Taxol  She is in good spirits, but says she feels \"run down\"  No N/V. Continues to hydrate.  +fatigue is her greatest complaint. No dizziness. +ext tingling. No oral lesions. Appetite low  She lives alone, children assist in her care. Pt tripped on stairs and broke her toe on her right foot (her weak side). She is using her cane to assist her stability      Current Outpatient Prescriptions   Medication Sig    amLODIPine (NORVASC) 5 mg tablet     SENNA-S 8.6-50 mg per tablet TK 1 T PO BID PRN    LOW-DOSE ASPIRIN PO Take  by mouth.  oxyCODONE-acetaminophen (PERCOCET) 5-325 mg per tablet Take 1 Tab by mouth every four (4) hours as needed for Pain. Max Daily Amount: 6 Tabs.     dexamethasone (DECADRON) 4 mg tablet Take 2 tablets with breakfast the day before chemo and for 2 days after chemo    lidocaine-prilocaine (EMLA) topical cream Apply small amount over port area one hour before chemo treatment and cover with a Band-Aid    ondansetron (ZOFRAN ODT) 4 mg disintegrating tablet Take 1 Tab by mouth every eight (8) hours as needed for Nausea. Indications: CANCER CHEMOTHERAPY-INDUCED NAUSEA AND VOMITING    amLODIPine (NORVASC) 10 mg tablet Take 10 mg by mouth daily. Indications: pt states taking 1/2 tablet daily    meclizine (ANTIVERT) 25 mg tablet Take 25 mg by mouth as needed.  traMADol (ULTRAM) 50 mg tablet Take 50 mg by mouth nightly.  aspirin (ASPIRIN) 325 mg tablet Take 325 mg by mouth daily.  atorvastatin (LIPITOR) 10 mg tablet Take 10 mg by mouth daily.  clopidogrel (PLAVIX) 75 mg tablet Take 75 mg by mouth daily.  gabapentin (NEURONTIN) 300 mg capsule Take 300 mg by mouth.  metoprolol (LOPRESSOR) 50 mg tablet Take 50 mg by mouth two (2) times a day.  oxybutynin (DITROPAN) 5 mg tablet Take 15 mg by mouth two (2) times a day. Current Facility-Administered Medications   Medication Dose Route Frequency    sodium chloride (NS) flush 5-10 mL  5-10 mL IntraVENous PRN    sodium chloride 0.9% injection 10 mL  10 mL IntraVENous PRN    heparin (porcine) pf 500 Units  500 Units IntraVENous PRN    0.9% sodium chloride infusion  50 mL/hr IntraVENous PRN    CARBOplatin (PARAPLATIN) 570 mg in 0.9% sodium chloride 250 mL, overfill volume 25 mL chemo infusion  570 mg IntraVENous ONCE    PACLitaxel (TAXOL) 410 mg in 0.9% sodium chloride 250 mL, overfill volume 25 mL chemo infusion  410 mg IntraVENous ONCE        Review of Systems:  General: Continues trying to eat more healthy diet. Continues with a lot of fatigue since her stroke, but says this past week, it has been a little worse. HEENT: Denies visual changes, dysphagia or headache  Resp: Denies SOB, PRIETO, wheezing or cough  CV: Denies CP, palpitations  GI/:Constipation controlled with [place].  .  Denies N/V, bloating, diarrhea, or dysuria  MuskSkel:Acknowledges persistent difficulty with right sided weakness, drop foot and hand grasp. Says she continues to work with her physical therapy. Broken toe as in HPI  Neuro: Denies neuropathy, dizzyness or syncope  Psych: Denies depression or feelings of sadness  Integumentary:improved ecchymosis. .      Objective:     Patient Vitals for the past 8 hrs:   BP Temp Pulse Resp Height Weight   07/05/18 0935 134/82 99.8 °F (37.7 °C) 88 18 5' 8.5\" (1.74 m) 222 lb 12.8 oz (101.1 kg)       Physical Exam:  General: A&O X3 in NAD  HEENT: Sclera anicteric, pale. Mucosa pale, moist without lesions. Pupils equal  Neck: No JVD or cervical adenopathy appreciated. Port Site: without redness, swelling or tenderness. Heart: Regular without M/R/G  Lungs: CTA Bilat without wheezing or rales. No cough noted  Abd: Soft, obese, NT/ND with + BS throughout  Ext: Without edema Right lower/upper ext remains weak. Toes warm and freely movable. Right arm retracted, unchanged, but pt uses it as much as possible. Neuro: grossly intact        Assessment:     Patient Active Problem List   Diagnosis Code    Hypertension I10    Chronic lung disease J98.4    Stroke ([address] I63.9    Uterine carcinosarcoma ([address] C55    Obesity (BMI 30.0-34. 9) E66.9    Elevated serum creatinine R79.89    Severe obesity (BMI 35.0-39. 9) with comorbidity ([address] E66.01    Anemia D64.9         Plan: Will  proceed with C6 today. Follow up with Dr. Montana Smith and PET/CT planned. She is scheduled for transfusion of 1UPRBC's tomorrow 7/6  She will continue her Plavix, and antihypertensives and follow with PCP.   Zofran PRN available to pt  Will hydrated with extra liter of NS today for creat  Senna daily with Miralax PRN  Encouraged to call for any concerns or questions      GOSIA Glez

## 2018-07-06 ENCOUNTER — HOSPITAL ENCOUNTER (OUTPATIENT)
Dept: INFUSION THERAPY | Age: 63
Discharge: HOME | End: 2018-07-06
Payer: MEDICARE

## 2018-07-06 VITALS
SYSTOLIC BLOOD PRESSURE: 144 MMHG | RESPIRATION RATE: 18 BRPM | DIASTOLIC BLOOD PRESSURE: 81 MMHG | HEART RATE: 80 BPM | TEMPERATURE: 97.6 F

## 2018-07-06 PROCEDURE — P9016 RBC LEUKOCYTES REDUCED: HCPCS | Performed by: PHYSICIAN ASSISTANT

## 2018-07-06 PROCEDURE — 36430 TRANSFUSION BLD/BLD COMPNT: CPT

## 2018-07-06 PROCEDURE — 74011000250 HC RX REV CODE- 250: Performed by: PHYSICIAN ASSISTANT

## 2018-07-06 PROCEDURE — 77030013169 SET IV BLD ICUM -A

## 2018-07-06 PROCEDURE — 74011250636 HC RX REV CODE- 250/636: Performed by: PHYSICIAN ASSISTANT

## 2018-07-06 PROCEDURE — 77030012965 HC NDL HUBR BBMI -A

## 2018-07-06 RX ORDER — HEPARIN 100 UNIT/ML
500 SYRINGE INTRAVENOUS AS NEEDED
Status: ACTIVE | OUTPATIENT
Start: 2018-07-06 | End: 2018-07-07

## 2018-07-06 RX ORDER — SODIUM CHLORIDE 9 MG/ML
10 INJECTION INTRAMUSCULAR; INTRAVENOUS; SUBCUTANEOUS AS NEEDED
Status: ACTIVE | OUTPATIENT
Start: 2018-07-06 | End: 2018-07-07

## 2018-07-06 RX ORDER — DIPHENHYDRAMINE HCL 25 MG
25 CAPSULE ORAL ONCE
Status: DISPENSED | OUTPATIENT
Start: 2018-07-06 | End: 2018-07-06

## 2018-07-06 RX ORDER — SODIUM CHLORIDE 0.9 % (FLUSH) 0.9 %
5-10 SYRINGE (ML) INJECTION AS NEEDED
Status: ACTIVE | OUTPATIENT
Start: 2018-07-06 | End: 2018-07-07

## 2018-07-06 RX ORDER — ACETAMINOPHEN 325 MG/1
650 TABLET ORAL ONCE
Status: DISPENSED | OUTPATIENT
Start: 2018-07-06 | End: 2018-07-06

## 2018-07-06 RX ORDER — SODIUM CHLORIDE 9 MG/ML
250 INJECTION, SOLUTION INTRAVENOUS AS NEEDED
Status: DISPENSED | OUTPATIENT
Start: 2018-07-06 | End: 2018-07-07

## 2018-07-06 RX ADMIN — SODIUM CHLORIDE, PRESERVATIVE FREE 500 UNITS: 5 INJECTION INTRAVENOUS at 11:49

## 2018-07-06 RX ADMIN — SODIUM CHLORIDE 250 ML: 900 INJECTION, SOLUTION INTRAVENOUS at 08:17

## 2018-07-06 RX ADMIN — SODIUM CHLORIDE 10 ML: 9 INJECTION INTRAMUSCULAR; INTRAVENOUS; SUBCUTANEOUS at 08:15

## 2018-07-06 RX ADMIN — Medication 10 ML: at 11:49

## 2018-07-06 NOTE — PROGRESS NOTES
Outpatient Infusion Center Progress Note    1285 Pt admit to [facility] for Blood Transfusion ambulatory with cane and in stable condition. Assessment completed. No new concerns voiced. Port accessed with positive blood return. Line connected to NS at [facility]. Visit Vitals    /79 (BP 1 Location: Left arm, BP Patient Position: Sitting)    Pulse 86    Temp 98.1 °F (36.7 °C)    Resp 18    LMP 01/09/2011       Medications:  NS at [facility]  1 Units of PRBCs    0900 First unit initiated. 1123 First unit completed. Pt monitored post transfusion with no s/s of reaction. Educated and provided with written material regarding s/s of delayed reaction to watch for at home. 1150 Pt tolerated treatment well. Port maintained positive blood return throughout treatment. Line flushed, heparinized, and de-accessed per protocol. D/c home ambulatory in no distress.

## 2018-07-06 NOTE — PROGRESS NOTES
Problem: Patient Education:  Go to Education Activity  Goal: Patient/Family Education  Outcome: Progressing Towards Goal  S/s of post transfusion reactions

## 2018-07-07 LAB
ABO + RH BLD: NORMAL
BLD PROD TYP BPU: NORMAL
BLOOD GROUP ANTIBODIES SERPL: NORMAL
BPU ID: NORMAL
CROSSMATCH RESULT,%XM: NORMAL
SPECIMEN EXP DATE BLD: NORMAL
STATUS OF UNIT,%ST: NORMAL
UNIT DIVISION, %UDIV: 0

## 2018-07-23 ENCOUNTER — HOSPITAL ENCOUNTER (OUTPATIENT)
Dept: PET IMAGING | Age: 63
Discharge: HOME | End: 2018-07-23
Attending: OBSTETRICS & GYNECOLOGY
Payer: MEDICARE

## 2018-07-23 VITALS — HEIGHT: 69 IN | WEIGHT: 222 LBS | BODY MASS INDEX: 32.88 KG/M2

## 2018-07-23 DIAGNOSIS — C55 UTERINE CARCINOSARCOMA (HCC): ICD-10-CM

## 2018-07-23 PROCEDURE — A9552 F18 FDG: HCPCS

## 2018-07-23 RX ORDER — SODIUM CHLORIDE 0.9 % (FLUSH) 0.9 %
10 SYRINGE (ML) INJECTION
Status: COMPLETED | OUTPATIENT
Start: 2018-07-23 | End: 2018-07-23

## 2018-07-23 RX ADMIN — Medication 10 ML: at 11:06

## 2018-07-24 ENCOUNTER — OFFICE VISIT (OUTPATIENT)
Dept: GYNECOLOGY | Age: 63
End: 2018-07-24

## 2018-07-24 VITALS
HEART RATE: 100 BPM | SYSTOLIC BLOOD PRESSURE: 103 MMHG | BODY MASS INDEX: 32.77 KG/M2 | HEIGHT: 69 IN | DIASTOLIC BLOOD PRESSURE: 68 MMHG

## 2018-07-24 DIAGNOSIS — C55 UTERINE CARCINOSARCOMA (HCC): Primary | ICD-10-CM

## 2018-07-24 NOTE — PROGRESS NOTES
Follow up to review pet scan. Pt c/o increased weakness and no appetite. Pt is trying to drink Boost and water.

## 2018-07-24 NOTE — PROGRESS NOTES
[facility] [address], [address] [facility]  P [phone]  F [phone]    Office Note  Patient ID:  Name:  Matthew Mehta  MRN:  [id number]  :  1955/63 y.o. Date:  2018      HISTORY OF PRESENT ILLNESS:  Luz Bragg is a 61 y.o.  postmenopausal female with a diagnosis of stage IA uterine carcinosarcoma. She underwent robotic hysterectomy with staging in late 2018. Pathology revealed LVSI but her sentinel nodes were negative. Pelvic washings were also negative. She was recommended adjuvant chemotherapy with Taxol/Carboplatin due to her risk of recurrence. She has completed 6 cycles of chemotherapy. She presents today to review her PET/CT and to discuss possible radiation therapy. ROS:   and GI review:  Negative  Cardiopulmonary review:  Negative   Musculoskeletal:  Negative    A comprehensive review of systems was negative except for that written in the History of Present Illness. , 10 point ROS        Problem List:  Patient Active Problem List    Diagnosis Date Noted    Anemia 2018    Severe obesity (BMI 35.0-39. 9) with comorbidity ([address] 2018    Elevated serum creatinine 2018    Uterine carcinosarcoma ([address] 2018    Obesity (BMI 30.0-34.9) 2018    Hypertension 2017    Chronic lung disease 2017    Stroke ([address] 2017     PMH:  Past Medical History:   Diagnosis Date    Cancer ([address]     UTERINE CA    Chronic pain     Hypercholesterolemia     Hypertension     Stroke ([address] 2007    R SIDE AFFECTED      PSH:  Past Surgical History:   Procedure Laterality Date    COLONOSCOPY,DIAGNOSTIC  2015         HX BREAST BIOPSY Right     right breast:  benign    HX GI      COLONOSCOPY    HX ORTHOPAEDIC      GREAT L TOE BONE SPUR REMOVED      Social History:  Social History   Substance Use Topics    Smoking status: Former Smoker     Quit date: 10/4/1991    Smokeless tobacco: Never Used    Alcohol use No      Family History:  Family History   Problem Relation Age of Onset    Heart Disease Mother     Hypertension Mother     Diabetes Mother     Cancer Mother 62     breast cancer    Breast Cancer Mother      52's    Cancer Father 61     lung cancer    Stroke Brother     Stroke Maternal Grandmother     Stroke Maternal Grandfather     Kidney Disease Brother     Hypertension Brother       Medications: (reviewed)  Current Outpatient Prescriptions   Medication Sig    dexamethasone (DECADRON) 4 mg tablet Take 2 tablets with breakfast the day before chemo and for 2 days after chemo    amLODIPine (NORVASC) 5 mg tablet     SENNA-S 8.6-50 mg per tablet TK 1 T PO BID PRN    LOW-DOSE ASPIRIN PO Take  by mouth.  lidocaine-prilocaine (EMLA) topical cream Apply small amount over port area one hour before chemo treatment and cover with a Band-Aid    ondansetron (ZOFRAN ODT) 4 mg disintegrating tablet Take 1 Tab by mouth every eight (8) hours as needed for Nausea. Indications: CANCER CHEMOTHERAPY-INDUCED NAUSEA AND VOMITING    amLODIPine (NORVASC) 10 mg tablet Take 10 mg by mouth daily. Indications: pt states taking 1/2 tablet daily    meclizine (ANTIVERT) 25 mg tablet Take 25 mg by mouth as needed.  traMADol (ULTRAM) 50 mg tablet Take 50 mg by mouth nightly.  aspirin (ASPIRIN) 325 mg tablet Take 325 mg by mouth daily.  atorvastatin (LIPITOR) 10 mg tablet Take 10 mg by mouth daily.  clopidogrel (PLAVIX) 75 mg tablet Take 75 mg by mouth daily.  gabapentin (NEURONTIN) 300 mg capsule Take 300 mg by mouth.  metoprolol (LOPRESSOR) 50 mg tablet Take 50 mg by mouth two (2) times a day.  oxybutynin (DITROPAN) 5 mg tablet Take 15 mg by mouth two (2) times a day.  oxyCODONE-acetaminophen (PERCOCET) 5-325 mg per tablet Take 1 Tab by mouth every four (4) hours as needed for Pain. Max Daily Amount: 6 Tabs.      No current facility-administered medications for this visit. Allergies: (reviewed)  Allergies   Allergen Reactions    Latex Itching    Pcn [Penicillins] Unknown (comments)     Pt's states she doesn't know reaction. OBJECTIVE:    Physical Exam:  VITAL SIGNS: Vitals:    07/24/18 1102   BP: 103/68   Pulse: 100   Height: 5' 9.02\" (1.753 m)     Body mass index is 32.77 kg/(m^2). GENERAL MELVIN: Conversant, alert, oriented. No acute distress. HEENT: HEENT. No thyroid enlargement. No JVD. Neck: Supple without restrictions. RESPIRATORY: Clear to auscultation and percussion to the bases. No CVAT. CARDIOVASC: RRR without murmur/rub. GASTROINT: soft, non-tender, without masses or organomegaly   MUSCULOSKEL: no joint tenderness, deformity or swelling   EXTREMITIES: extremities normal, atraumatic, no cyanosis or edema   PELVIC: Deferred   RECTAL: Deferred   JOHN SURVEY: No suspicious lymphadenopathy or edema noted. NEURO: Grossly intact. No acute deficit. Lab Results   Component Value Date/Time    WBC 4.6 07/03/2018 12:05 PM    HGB 8.0 (L) 07/03/2018 12:05 PM    HCT 24.8 (L) 07/03/2018 12:05 PM    PLATELET 251 (L) 72/69/4330 12:05 PM    .2 (H) 07/03/2018 12:05 PM     Lab Results   Component Value Date/Time    Sodium 140 07/03/2018 12:05 PM    Potassium 4.1 07/03/2018 12:05 PM    Chloride 108 07/03/2018 12:05 PM    CO2 24 07/03/2018 12:05 PM    Anion gap 8 07/03/2018 12:05 PM    Glucose 118 (H) 07/03/2018 12:05 PM    BUN 26 (H) 07/03/2018 12:05 PM    Creatinine 1.50 (H) 07/03/2018 12:05 PM    BUN/Creatinine ratio 17 07/03/2018 12:05 PM    GFR est AA 43 (L) 07/03/2018 12:05 PM    GFR est non-AA 35 (L) 07/03/2018 12:05 PM    Calcium 9.0 07/03/2018 12:05 PM     Lab Results   Component Value Date/Time    CA-125 20 07/03/2018 12:05 PM    Cancer Ag (CA) 125 27.0 05/21/2018 11:04 AM       PET/CT (7/23/18)  HEAD/NECK: No apparent foci of abnormal hypermetabolism. Cerebral evaluation is  limited by normal intense activity.  Old left MCA infarct is noted.     CHEST: No foci of abnormal hypermetabolism.     ABDOMEN/PELVIS: No foci of abnormal hypermetabolism. Left adrenal nodules are  stable and do not demonstrate increased tracer activity. The uterus is  surgically absent.     SKELETON: No foci of abnormal hypermetabolism in the axial and visualized  appendicular skeleton.     IMPRESSION: No Foci of Abnormal Hypermetabolism. IMPRESSION/PLAN:  Luz Bragg is a 61 y.o. female with a diagnosis of stage IA uterine carcinosarcoma. She is currently receiving Taxol/Carboplatin chemotherapy. She has completed 6 cycles so far. Her PET shows no evidence of disease. I am going to refer her to radiation oncology for consultation at this time.         Signed By: Silva Chicas MD     7/24/2018/12:10 PM

## 2018-07-26 ENCOUNTER — HOSPITAL ENCOUNTER (OUTPATIENT)
Dept: RADIATION THERAPY | Age: 63
Discharge: HOME | End: 2018-07-26
Payer: MEDICARE

## 2018-07-28 PROCEDURE — 77470 SPECIAL RADIATION TREATMENT: CPT

## 2018-08-01 ENCOUNTER — HOSPITAL ENCOUNTER (OUTPATIENT)
Dept: RADIATION THERAPY | Age: 63
Discharge: HOME | End: 2018-08-01
Payer: MEDICARE

## 2018-08-01 PROCEDURE — 77334 RADIATION TREATMENT AID(S): CPT

## 2018-08-01 PROCEDURE — 77307 TELETHX ISODOSE PLAN CPLX: CPT

## 2018-08-02 PROCEDURE — 77307 TELETHX ISODOSE PLAN CPLX: CPT

## 2018-08-02 PROCEDURE — 77334 RADIATION TREATMENT AID(S): CPT

## 2018-08-14 ENCOUNTER — HOSPITAL ENCOUNTER (OUTPATIENT)
Dept: RADIATION THERAPY | Age: 63
Discharge: HOME | End: 2018-08-14
Payer: MEDICARE

## 2018-08-15 ENCOUNTER — HOSPITAL ENCOUNTER (OUTPATIENT)
Dept: RADIATION THERAPY | Age: 63
Discharge: HOME | End: 2018-08-15
Payer: MEDICARE

## 2018-08-15 PROCEDURE — 77387 GUIDANCE FOR RADJ TX DLVR: CPT

## 2018-08-15 PROCEDURE — 77417 THER RADIOLOGY PORT IMAGE(S): CPT

## 2018-08-15 PROCEDURE — 77412 RADIATION TX DELIVERY LVL 3: CPT

## 2018-08-16 ENCOUNTER — HOSPITAL ENCOUNTER (OUTPATIENT)
Dept: RADIATION THERAPY | Age: 63
Discharge: HOME | End: 2018-08-16
Payer: MEDICARE

## 2018-08-16 PROCEDURE — 77387 GUIDANCE FOR RADJ TX DLVR: CPT

## 2018-08-16 PROCEDURE — 77412 RADIATION TX DELIVERY LVL 3: CPT

## 2018-08-17 ENCOUNTER — HOSPITAL ENCOUNTER (OUTPATIENT)
Dept: RADIATION THERAPY | Age: 63
Discharge: HOME | End: 2018-08-17
Payer: MEDICARE

## 2018-08-17 PROCEDURE — 77387 GUIDANCE FOR RADJ TX DLVR: CPT

## 2018-08-17 PROCEDURE — 77412 RADIATION TX DELIVERY LVL 3: CPT

## 2018-08-20 ENCOUNTER — HOSPITAL ENCOUNTER (OUTPATIENT)
Dept: RADIATION THERAPY | Age: 63
Discharge: HOME | End: 2018-08-20
Payer: MEDICARE

## 2018-08-20 PROCEDURE — 77412 RADIATION TX DELIVERY LVL 3: CPT

## 2018-08-20 PROCEDURE — 77387 GUIDANCE FOR RADJ TX DLVR: CPT

## 2018-08-21 ENCOUNTER — TELEPHONE (OUTPATIENT)
Dept: GYNECOLOGY | Age: 63
End: 2018-08-21

## 2018-08-21 ENCOUNTER — HOSPITAL ENCOUNTER (OUTPATIENT)
Dept: RADIATION THERAPY | Age: 63
Discharge: HOME | End: 2018-08-21
Payer: MEDICARE

## 2018-08-21 PROCEDURE — 77387 GUIDANCE FOR RADJ TX DLVR: CPT

## 2018-08-21 PROCEDURE — 77412 RADIATION TX DELIVERY LVL 3: CPT

## 2018-08-21 PROCEDURE — 77336 RADIATION PHYSICS CONSULT: CPT

## 2018-08-22 ENCOUNTER — HOSPITAL ENCOUNTER (OUTPATIENT)
Dept: RADIATION THERAPY | Age: 63
Discharge: HOME | End: 2018-08-22
Payer: MEDICARE

## 2018-08-22 PROCEDURE — 77412 RADIATION TX DELIVERY LVL 3: CPT

## 2018-08-22 PROCEDURE — 77387 GUIDANCE FOR RADJ TX DLVR: CPT

## 2018-08-23 ENCOUNTER — HOSPITAL ENCOUNTER (OUTPATIENT)
Dept: RADIATION THERAPY | Age: 63
Discharge: HOME | End: 2018-08-23
Payer: MEDICARE

## 2018-08-23 PROCEDURE — 77387 GUIDANCE FOR RADJ TX DLVR: CPT

## 2018-08-23 PROCEDURE — 77412 RADIATION TX DELIVERY LVL 3: CPT

## 2018-08-24 ENCOUNTER — HOSPITAL ENCOUNTER (OUTPATIENT)
Dept: RADIATION THERAPY | Age: 63
Discharge: HOME | End: 2018-08-24
Payer: MEDICARE

## 2018-08-24 PROCEDURE — 77412 RADIATION TX DELIVERY LVL 3: CPT

## 2018-08-24 PROCEDURE — 77387 GUIDANCE FOR RADJ TX DLVR: CPT

## 2018-08-27 ENCOUNTER — TELEPHONE (OUTPATIENT)
Dept: GYNECOLOGY | Age: 63
End: 2018-08-27

## 2018-08-27 ENCOUNTER — HOSPITAL ENCOUNTER (OUTPATIENT)
Dept: RADIATION THERAPY | Age: 63
Discharge: HOME | End: 2018-08-27
Payer: MEDICARE

## 2018-08-27 PROCEDURE — 77412 RADIATION TX DELIVERY LVL 3: CPT

## 2018-08-27 PROCEDURE — 77387 GUIDANCE FOR RADJ TX DLVR: CPT

## 2018-08-27 NOTE — TELEPHONE ENCOUNTER
Pt calling to find out aprox time she will be in infusion for a port flush. Told her about 30 minutes.

## 2018-08-28 ENCOUNTER — HOSPITAL ENCOUNTER (OUTPATIENT)
Dept: RADIATION THERAPY | Age: 63
Discharge: HOME | End: 2018-08-28
Payer: MEDICARE

## 2018-08-28 PROCEDURE — 77412 RADIATION TX DELIVERY LVL 3: CPT

## 2018-08-28 PROCEDURE — 77336 RADIATION PHYSICS CONSULT: CPT

## 2018-08-28 PROCEDURE — 77387 GUIDANCE FOR RADJ TX DLVR: CPT

## 2018-08-29 ENCOUNTER — HOSPITAL ENCOUNTER (OUTPATIENT)
Dept: RADIATION THERAPY | Age: 63
Discharge: HOME | End: 2018-08-29
Payer: MEDICARE

## 2018-08-29 PROCEDURE — 77412 RADIATION TX DELIVERY LVL 3: CPT

## 2018-08-29 PROCEDURE — 77387 GUIDANCE FOR RADJ TX DLVR: CPT

## 2018-08-30 ENCOUNTER — HOSPITAL ENCOUNTER (OUTPATIENT)
Dept: RADIATION THERAPY | Age: 63
Discharge: HOME | End: 2018-08-30
Payer: MEDICARE

## 2018-08-30 PROCEDURE — 77387 GUIDANCE FOR RADJ TX DLVR: CPT

## 2018-08-30 PROCEDURE — 77412 RADIATION TX DELIVERY LVL 3: CPT

## 2018-08-31 ENCOUNTER — HOSPITAL ENCOUNTER (OUTPATIENT)
Dept: RADIATION THERAPY | Age: 63
Discharge: HOME | End: 2018-08-31
Payer: MEDICARE

## 2018-08-31 ENCOUNTER — HOSPITAL ENCOUNTER (OUTPATIENT)
Dept: INFUSION THERAPY | Age: 63
Discharge: HOME | End: 2018-08-31
Payer: MEDICARE

## 2018-08-31 VITALS
RESPIRATION RATE: 18 BRPM | HEART RATE: 86 BPM | TEMPERATURE: 98.8 F | DIASTOLIC BLOOD PRESSURE: 80 MMHG | SYSTOLIC BLOOD PRESSURE: 133 MMHG

## 2018-08-31 LAB
ALBUMIN SERPL-MCNC: 3.4 G/DL (ref 3.5–5)
ALBUMIN/GLOB SERPL: 0.8 {RATIO} (ref 1.1–2.2)
ALP SERPL-CCNC: 82 U/L (ref 45–117)
ALT SERPL-CCNC: 16 U/L (ref 12–78)
ANION GAP SERPL CALC-SCNC: 9 MMOL/L (ref 5–15)
AST SERPL-CCNC: 15 U/L (ref 15–37)
BASOPHILS # BLD: 0 K/UL (ref 0–0.1)
BASOPHILS NFR BLD: 0 % (ref 0–1)
BILIRUB SERPL-MCNC: 1 MG/DL (ref 0.2–1)
BUN SERPL-MCNC: 13 MG/DL (ref 6–20)
BUN/CREAT SERPL: 10 (ref 12–20)
CALCIUM SERPL-MCNC: 8.8 MG/DL (ref 8.5–10.1)
CANCER AG125 SERPL-ACNC: 31 U/ML (ref 1.5–35)
CHLORIDE SERPL-SCNC: 108 MMOL/L (ref 97–108)
CO2 SERPL-SCNC: 23 MMOL/L (ref 21–32)
CREAT SERPL-MCNC: 1.27 MG/DL (ref 0.55–1.02)
DIFFERENTIAL METHOD BLD: ABNORMAL
EOSINOPHIL # BLD: 0.1 K/UL (ref 0–0.4)
EOSINOPHIL NFR BLD: 3 % (ref 0–7)
ERYTHROCYTE [DISTWIDTH] IN BLOOD BY AUTOMATED COUNT: 20.8 % (ref 11.5–14.5)
GLOBULIN SER CALC-MCNC: 4.1 G/DL (ref 2–4)
GLUCOSE SERPL-MCNC: 115 MG/DL (ref 65–100)
HCT VFR BLD AUTO: 28.2 % (ref 35–47)
HGB BLD-MCNC: 8.7 G/DL (ref 11.5–16)
IMM GRANULOCYTES # BLD: 0 K/UL (ref 0–0.04)
IMM GRANULOCYTES NFR BLD AUTO: 0 % (ref 0–0.5)
LYMPHOCYTES # BLD: 0.5 K/UL (ref 0.8–3.5)
LYMPHOCYTES NFR BLD: 11 % (ref 12–49)
MAGNESIUM SERPL-MCNC: 1.4 MG/DL (ref 1.6–2.4)
MCH RBC QN AUTO: 31 PG (ref 26–34)
MCHC RBC AUTO-ENTMCNC: 30.9 G/DL (ref 30–36.5)
MCV RBC AUTO: 100.4 FL (ref 80–99)
MONOCYTES # BLD: 0.5 K/UL (ref 0–1)
MONOCYTES NFR BLD: 10 % (ref 5–13)
NEUTS SEG # BLD: 3.4 K/UL (ref 1.8–8)
NEUTS SEG NFR BLD: 76 % (ref 32–75)
NRBC # BLD: 0 K/UL (ref 0–0.01)
NRBC BLD-RTO: 0 PER 100 WBC
PLATELET # BLD AUTO: 246 K/UL (ref 150–400)
PMV BLD AUTO: 10.8 FL (ref 8.9–12.9)
POTASSIUM SERPL-SCNC: 3.5 MMOL/L (ref 3.5–5.1)
PROT SERPL-MCNC: 7.5 G/DL (ref 6.4–8.2)
RBC # BLD AUTO: 2.81 M/UL (ref 3.8–5.2)
RBC MORPH BLD: ABNORMAL
SODIUM SERPL-SCNC: 140 MMOL/L (ref 136–145)
WBC # BLD AUTO: 4.5 K/UL (ref 3.6–11)

## 2018-08-31 PROCEDURE — 86304 IMMUNOASSAY TUMOR CA 125: CPT | Performed by: OBSTETRICS & GYNECOLOGY

## 2018-08-31 PROCEDURE — 77387 GUIDANCE FOR RADJ TX DLVR: CPT

## 2018-08-31 PROCEDURE — 36415 COLL VENOUS BLD VENIPUNCTURE: CPT | Performed by: OBSTETRICS & GYNECOLOGY

## 2018-08-31 PROCEDURE — 83735 ASSAY OF MAGNESIUM: CPT | Performed by: OBSTETRICS & GYNECOLOGY

## 2018-08-31 PROCEDURE — 85025 COMPLETE CBC W/AUTO DIFF WBC: CPT | Performed by: OBSTETRICS & GYNECOLOGY

## 2018-08-31 PROCEDURE — 36591 DRAW BLOOD OFF VENOUS DEVICE: CPT

## 2018-08-31 PROCEDURE — 74011000250 HC RX REV CODE- 250: Performed by: OBSTETRICS & GYNECOLOGY

## 2018-08-31 PROCEDURE — 77030012965 HC NDL HUBR BBMI -A

## 2018-08-31 PROCEDURE — 77412 RADIATION TX DELIVERY LVL 3: CPT

## 2018-08-31 PROCEDURE — 80053 COMPREHEN METABOLIC PANEL: CPT | Performed by: OBSTETRICS & GYNECOLOGY

## 2018-08-31 PROCEDURE — 74011250636 HC RX REV CODE- 250/636: Performed by: OBSTETRICS & GYNECOLOGY

## 2018-08-31 RX ORDER — SODIUM CHLORIDE 0.9 % (FLUSH) 0.9 %
5-10 SYRINGE (ML) INJECTION AS NEEDED
Status: ACTIVE | OUTPATIENT
Start: 2018-08-31 | End: 2018-09-01

## 2018-08-31 RX ORDER — HEPARIN 100 UNIT/ML
500 SYRINGE INTRAVENOUS AS NEEDED
Status: ACTIVE | OUTPATIENT
Start: 2018-08-31 | End: 2018-09-01

## 2018-08-31 RX ORDER — SODIUM CHLORIDE 9 MG/ML
10 INJECTION INTRAMUSCULAR; INTRAVENOUS; SUBCUTANEOUS AS NEEDED
Status: ACTIVE | OUTPATIENT
Start: 2018-08-31 | End: 2018-09-01

## 2018-08-31 RX ADMIN — SODIUM CHLORIDE 10 ML: 9 INJECTION INTRAMUSCULAR; INTRAVENOUS; SUBCUTANEOUS at 11:04

## 2018-08-31 RX ADMIN — SODIUM CHLORIDE, PRESERVATIVE FREE 500 UNITS: 5 INJECTION INTRAVENOUS at 11:04

## 2018-08-31 RX ADMIN — SODIUM CHLORIDE 10 ML: 9 INJECTION INTRAMUSCULAR; INTRAVENOUS; SUBCUTANEOUS at 11:06

## 2018-08-31 NOTE — PROGRESS NOTES
Outpatient Infusion Center Short Visit Progress Note 1050 Pt admit to [facility] for blood draw and port flush ambulatory with rolling walker in stable condition. Pt had a stroke and suffers from right sided weakness. Assessment completed. No new concerns voiced. Visit Vitals  /80 (BP 1 Location: Left arm, BP Patient Position: At rest)  Pulse 86  Temp 98.8 °F (37.1 °C)  Resp 18  LMP 01/09/2011  Breastfeeding No  
 
 
Port accessed with positive blood return. CBC, CMP,Mag and Ca-125 drawn and sent for processing. IV flushed, heparinized and de-accessed according to policy 1110 Pt tolerated treatment well. D/c home ambulatory in no distress. Pt aware of next appointment scheduled for 9/4/18 Recent Results (from the past 12 hour(s)) CBC WITH AUTOMATED DIFF Collection Time: 08/31/18 10:55 AM  
Result Value Ref Range WBC 4.5 3.6 - 11.0 K/uL  
 RBC 2.81 (L) 3.80 - 5.20 M/uL HGB 8.7 (L) 11.5 - 16.0 g/dL HCT 28.2 (L) 35.0 - 47.0 % .4 (H) 80.0 - 99.0 FL  
 MCH 31.0 26.0 - 34.0 PG  
 MCHC 30.9 30.0 - 36.5 g/dL RDW 20.8 (H) 11.5 - 14.5 % PLATELET 676 418 - 158 K/uL MPV 10.8 8.9 - 12.9 FL  
 NRBC 0.0 0  WBC ABSOLUTE NRBC 0.00 0.00 - 0.01 K/uL NEUTROPHILS PENDING % LYMPHOCYTES PENDING % MONOCYTES PENDING % EOSINOPHILS PENDING % BASOPHILS PENDING % IMMATURE GRANULOCYTES PENDING %  
 ABS. NEUTROPHILS PENDING K/UL  
 ABS. LYMPHOCYTES PENDING K/UL  
 ABS. MONOCYTES PENDING K/UL  
 ABS. EOSINOPHILS PENDING K/UL  
 ABS. BASOPHILS PENDING K/UL  
 ABS. IMM. GRANS.  PENDING K/UL  
 DF PENDING

## 2018-09-04 ENCOUNTER — HOSPITAL ENCOUNTER (OUTPATIENT)
Dept: RADIATION THERAPY | Age: 63
Discharge: HOME | End: 2018-09-04
Payer: MEDICARE

## 2018-09-04 PROCEDURE — 77387 GUIDANCE FOR RADJ TX DLVR: CPT

## 2018-09-04 PROCEDURE — 77412 RADIATION TX DELIVERY LVL 3: CPT

## 2018-09-05 ENCOUNTER — HOSPITAL ENCOUNTER (OUTPATIENT)
Dept: RADIATION THERAPY | Age: 63
Discharge: HOME | End: 2018-09-05
Payer: MEDICARE

## 2018-09-05 PROCEDURE — 77412 RADIATION TX DELIVERY LVL 3: CPT

## 2018-09-05 PROCEDURE — 77336 RADIATION PHYSICS CONSULT: CPT

## 2018-09-07 ENCOUNTER — HOSPITAL ENCOUNTER (OUTPATIENT)
Dept: RADIATION THERAPY | Age: 63
Discharge: HOME | End: 2018-09-07
Payer: MEDICARE

## 2018-09-07 PROCEDURE — 77412 RADIATION TX DELIVERY LVL 3: CPT

## 2018-09-10 ENCOUNTER — HOSPITAL ENCOUNTER (OUTPATIENT)
Dept: RADIATION THERAPY | Age: 63
Discharge: HOME | End: 2018-09-10
Payer: MEDICARE

## 2018-09-10 PROCEDURE — 77417 THER RADIOLOGY PORT IMAGE(S): CPT

## 2018-09-10 PROCEDURE — 77412 RADIATION TX DELIVERY LVL 3: CPT

## 2018-09-11 ENCOUNTER — HOSPITAL ENCOUNTER (OUTPATIENT)
Dept: RADIATION THERAPY | Age: 63
Discharge: HOME | End: 2018-09-11
Payer: MEDICARE

## 2018-09-11 PROCEDURE — 77412 RADIATION TX DELIVERY LVL 3: CPT

## 2018-09-12 ENCOUNTER — HOSPITAL ENCOUNTER (OUTPATIENT)
Dept: RADIATION THERAPY | Age: 63
Discharge: HOME | End: 2018-09-12
Payer: MEDICARE

## 2018-09-12 PROCEDURE — 77336 RADIATION PHYSICS CONSULT: CPT

## 2018-09-12 PROCEDURE — 77412 RADIATION TX DELIVERY LVL 3: CPT

## 2018-09-13 ENCOUNTER — HOSPITAL ENCOUNTER (OUTPATIENT)
Dept: RADIATION THERAPY | Age: 63
Discharge: HOME | End: 2018-09-13
Payer: MEDICARE

## 2018-09-13 PROCEDURE — 77412 RADIATION TX DELIVERY LVL 3: CPT

## 2018-09-14 ENCOUNTER — HOSPITAL ENCOUNTER (OUTPATIENT)
Dept: RADIATION THERAPY | Age: 63
Discharge: HOME | End: 2018-09-14
Payer: MEDICARE

## 2018-09-14 PROCEDURE — 77412 RADIATION TX DELIVERY LVL 3: CPT

## 2018-09-17 ENCOUNTER — HOSPITAL ENCOUNTER (OUTPATIENT)
Dept: RADIATION THERAPY | Age: 63
Discharge: HOME | End: 2018-09-17
Payer: MEDICARE

## 2018-09-17 PROCEDURE — 77417 THER RADIOLOGY PORT IMAGE(S): CPT

## 2018-09-17 PROCEDURE — 77412 RADIATION TX DELIVERY LVL 3: CPT

## 2018-09-18 ENCOUNTER — HOSPITAL ENCOUNTER (OUTPATIENT)
Dept: RADIATION THERAPY | Age: 63
Discharge: HOME | End: 2018-09-18
Payer: MEDICARE

## 2018-09-18 PROCEDURE — 77412 RADIATION TX DELIVERY LVL 3: CPT

## 2018-09-19 ENCOUNTER — HOSPITAL ENCOUNTER (OUTPATIENT)
Dept: RADIATION THERAPY | Age: 63
Discharge: HOME | End: 2018-09-19
Payer: MEDICARE

## 2018-09-19 PROCEDURE — 77336 RADIATION PHYSICS CONSULT: CPT

## 2018-09-19 PROCEDURE — 77412 RADIATION TX DELIVERY LVL 3: CPT

## 2018-09-20 ENCOUNTER — HOSPITAL ENCOUNTER (OUTPATIENT)
Dept: RADIATION THERAPY | Age: 63
Discharge: HOME | End: 2018-09-20
Payer: MEDICARE

## 2018-09-21 ENCOUNTER — HOSPITAL ENCOUNTER (OUTPATIENT)
Dept: RADIATION THERAPY | Age: 63
Discharge: HOME | End: 2018-09-21
Payer: MEDICARE

## 2018-09-25 ENCOUNTER — APPOINTMENT (OUTPATIENT)
Dept: GENERAL RADIOLOGY | Age: 63
End: 2018-09-25
Attending: EMERGENCY MEDICINE
Payer: MEDICARE

## 2018-09-25 ENCOUNTER — HOSPITAL ENCOUNTER (EMERGENCY)
Age: 63
Discharge: HOME | End: 2018-09-25
Attending: EMERGENCY MEDICINE
Payer: MEDICARE

## 2018-09-25 VITALS
SYSTOLIC BLOOD PRESSURE: 110 MMHG | BODY MASS INDEX: 29.1 KG/M2 | WEIGHT: 192 LBS | TEMPERATURE: 100.1 F | DIASTOLIC BLOOD PRESSURE: 68 MMHG | HEART RATE: 91 BPM | HEIGHT: 68 IN | OXYGEN SATURATION: 99 % | RESPIRATION RATE: 16 BRPM

## 2018-09-25 DIAGNOSIS — M79.604 RIGHT LEG PAIN: Primary | ICD-10-CM

## 2018-09-25 LAB
ANION GAP SERPL CALC-SCNC: 9 MMOL/L (ref 5–15)
BASOPHILS # BLD: 0 K/UL (ref 0–0.1)
BASOPHILS NFR BLD: 0 % (ref 0–1)
BUN SERPL-MCNC: 12 MG/DL (ref 6–20)
BUN/CREAT SERPL: 10 (ref 12–20)
CALCIUM SERPL-MCNC: 8.5 MG/DL (ref 8.5–10.1)
CHLORIDE SERPL-SCNC: 105 MMOL/L (ref 97–108)
CO2 SERPL-SCNC: 26 MMOL/L (ref 21–32)
COMMENT, HOLDF: NORMAL
CREAT SERPL-MCNC: 1.22 MG/DL (ref 0.55–1.02)
DIFFERENTIAL METHOD BLD: ABNORMAL
EOSINOPHIL # BLD: 0.1 K/UL (ref 0–0.4)
EOSINOPHIL NFR BLD: 1 % (ref 0–7)
ERYTHROCYTE [DISTWIDTH] IN BLOOD BY AUTOMATED COUNT: 17.2 % (ref 11.5–14.5)
GLUCOSE SERPL-MCNC: 78 MG/DL (ref 65–100)
HCT VFR BLD AUTO: 26.1 % (ref 35–47)
HGB BLD-MCNC: 8.1 G/DL (ref 11.5–16)
IMM GRANULOCYTES # BLD: 0.1 K/UL (ref 0–0.04)
IMM GRANULOCYTES NFR BLD AUTO: 1 % (ref 0–0.5)
LYMPHOCYTES # BLD: 0.4 K/UL (ref 0.8–3.5)
LYMPHOCYTES NFR BLD: 8 % (ref 12–49)
MCH RBC QN AUTO: 31.3 PG (ref 26–34)
MCHC RBC AUTO-ENTMCNC: 31 G/DL (ref 30–36.5)
MCV RBC AUTO: 100.8 FL (ref 80–99)
MONOCYTES # BLD: 0.5 K/UL (ref 0–1)
MONOCYTES NFR BLD: 10 % (ref 5–13)
NEUTS SEG # BLD: 4.3 K/UL (ref 1.8–8)
NEUTS SEG NFR BLD: 80 % (ref 32–75)
NRBC # BLD: 0.03 K/UL (ref 0–0.01)
NRBC BLD-RTO: 0.6 PER 100 WBC
PLATELET # BLD AUTO: 308 K/UL (ref 150–400)
PMV BLD AUTO: 9.5 FL (ref 8.9–12.9)
POTASSIUM SERPL-SCNC: 3.6 MMOL/L (ref 3.5–5.1)
RBC # BLD AUTO: 2.59 M/UL (ref 3.8–5.2)
RBC MORPH BLD: ABNORMAL
SAMPLES BEING HELD,HOLD: NORMAL
SODIUM SERPL-SCNC: 140 MMOL/L (ref 136–145)
WBC # BLD AUTO: 5.4 K/UL (ref 3.6–11)

## 2018-09-25 PROCEDURE — 73590 X-RAY EXAM OF LOWER LEG: CPT

## 2018-09-25 PROCEDURE — 80048 BASIC METABOLIC PNL TOTAL CA: CPT | Performed by: EMERGENCY MEDICINE

## 2018-09-25 PROCEDURE — G8978 MOBILITY CURRENT STATUS: HCPCS

## 2018-09-25 PROCEDURE — 74011250636 HC RX REV CODE- 250/636: Performed by: EMERGENCY MEDICINE

## 2018-09-25 PROCEDURE — 97116 GAIT TRAINING THERAPY: CPT

## 2018-09-25 PROCEDURE — 93971 EXTREMITY STUDY: CPT

## 2018-09-25 PROCEDURE — 36415 COLL VENOUS BLD VENIPUNCTURE: CPT | Performed by: EMERGENCY MEDICINE

## 2018-09-25 PROCEDURE — G8979 MOBILITY GOAL STATUS: HCPCS

## 2018-09-25 PROCEDURE — 77030003560 HC NDL HUBR BARD -A

## 2018-09-25 PROCEDURE — 99284 EMERGENCY DEPT VISIT MOD MDM: CPT

## 2018-09-25 PROCEDURE — 97530 THERAPEUTIC ACTIVITIES: CPT

## 2018-09-25 PROCEDURE — G8980 MOBILITY D/C STATUS: HCPCS

## 2018-09-25 PROCEDURE — 85025 COMPLETE CBC W/AUTO DIFF WBC: CPT | Performed by: EMERGENCY MEDICINE

## 2018-09-25 PROCEDURE — 73552 X-RAY EXAM OF FEMUR 2/>: CPT

## 2018-09-25 PROCEDURE — 97162 PT EVAL MOD COMPLEX 30 MIN: CPT

## 2018-09-25 RX ORDER — HEPARIN 100 UNIT/ML
300 SYRINGE INTRAVENOUS
Status: COMPLETED | OUTPATIENT
Start: 2018-09-25 | End: 2018-09-25

## 2018-09-25 RX ADMIN — Medication 300 UNITS: at 17:59

## 2018-09-25 NOTE — PROCEDURES
[address]  *** FINAL REPORT ***    Name: Missy Johnson  MRN: [id number]  : 1955  HIS Order #: [id number]  [id number] [facility] Visit #: [id number]  Date: 25 Sep 2018    TYPE OF TEST: Peripheral Venous Testing    REASON FOR TEST  Pain in limb, Limb swelling    Right Leg:-  Deep venous thrombosis:           No  Superficial venous thrombosis:    No  Deep venous insufficiency:        Not examined  Superficial venous insufficiency: Not examined      INTERPRETATION/FINDINGS  PROCEDURE:  Color duplex ultrasound imaging of lower extremity veins. FINDINGS:       Right: The common femoral, deep femoral, femoral, popliteal,  posterior tibial, peroneal, and great saphenous are patent and without   evidence of thrombus; each is is fully compressible and there is no  narrowing of the flow channel on color Doppler imaging. Phasic flow  is observed in the common femoral vein. Left:   The common femoral vein is patent and without evidence of   thrombus. Phasic flow is observed. This extremity was not otherwise   evaluated. IMPRESSION:  No evidence of right lower extremity vein  thrombosis. There is an incidental finding of a popliteal fossa fluid  collection measuring 1.83cm X 5.29cm    ADDITIONAL COMMENTS    I have personally reviewed the data relevant to the interpretation of  this  study. TECHNOLOGIST: Elly Vieyra.  Ajay Hollis  Signed: 2018 04:14 PM    PHYSICIAN: Rojelio Luu MD  Signed: 2018 07:10 AM

## 2018-09-25 NOTE — DISCHARGE INSTRUCTIONS
Leg Pain: Care Instructions  Your Care Instructions  Many things can cause leg pain. Too much exercise or overuse can cause a muscle cramp (or charley horse). You can get leg cramps from not eating a balanced diet that has enough potassium, calcium, and other minerals. If you do not drink enough fluids or are taking certain medicines, you may develop leg cramps. Other causes of leg pain include injuries, blood flow problems, nerve damage, and twisted and enlarged veins (varicose veins). You can usually ease pain with self-care. Your doctor may recommend that you rest your leg and keep it elevated. Follow-up care is a key part of your treatment and safety. Be sure to make and go to all appointments, and call your doctor if you are having problems. It's also a good idea to know your test results and keep a list of the medicines you take. How can you care for yourself at home? · Take pain medicines exactly as directed. ¨ If the doctor gave you a prescription medicine for pain, take it as prescribed. ¨ If you are not taking a prescription pain medicine, ask your doctor if you can take an over-the-counter medicine. · Take any other medicines exactly as prescribed. Call your doctor if you think you are having a problem with your medicine. · Rest your leg while you have pain, and avoid standing for long periods of time. · Prop up your leg at or above the level of your heart when possible. · Make sure you are eating a balanced diet that is rich in calcium, potassium, and magnesium, especially if you are pregnant. · If directed by your doctor, put ice or a cold pack on the area for 10 to 20 minutes at a time. Put a thin cloth between the ice and your skin. · Your leg may be in a splint, a brace, or an elastic bandage, and you may have crutches to help you walk. Follow your doctor's directions about how long to wear supports and how to use the crutches. When should you call for help?   Call 911 anytime you think you may need emergency care. For example, call if:    · You have sudden chest pain and shortness of breath, or you cough up blood.     · Your leg is cool or pale or changes color.    Call your doctor now or seek immediate medical care if:    · You have increasing or severe pain.     · Your leg suddenly feels weak and you cannot move it.     · You have signs of a blood clot, such as:  ¨ Pain in your calf, back of the knee, thigh, or groin. ¨ Redness and swelling in your leg or groin.     · You have signs of infection, such as:  ¨ Increased pain, swelling, warmth, or redness. ¨ Red streaks leading from the sore area. ¨ Pus draining from a place on your leg. ¨ A fever.     · You cannot bear weight on your leg.    Watch closely for changes in your health, and be sure to contact your doctor if:    · You do not get better as expected. Where can you learn more? Go to http://mario-neil.info/. Enter D385 in the search box to learn more about \"Leg Pain: Care Instructions. \"  Current as of: November 20, 2017  Content Version: 11.7  © 5510-7656 [employer]. Care instructions adapted under license by [employer] (which disclaims liability or warranty for this information). If you have questions about a medical condition or this instruction, always ask your healthcare professional. [address] any warranty or liability for your use of this information.

## 2018-09-25 NOTE — ED PROVIDER NOTES
HPI  
 
  61y F with hx of uterine CA, prior CVA, HTN here with RLE pain. Had a mechanical fall 2 weeks ago and had swelling mostly to the lateral aspect of the distal thigh. Overall seemed to be doing ok until the past few days when she developed pain and swelling to the R calf. Sent here for further evaluation. Didn't hit her head or have LOC at the time of the fall. No other injuries or complaints. Past Medical History:  
Diagnosis Date  Cancer ([facility] [address] UTERINE CA  
 Chronic pain  Hypercholesterolemia  Hypertension  Stroke [facility]) 9/2007 R SIDE AFFECTED Past Surgical History:  
Procedure Laterality Date  COLONOSCOPY,DIAGNOSTIC  4/23/2015  HX BREAST BIOPSY Right 1993  
 right breast:  benign  HX GI    
 COLONOSCOPY  
 HX ORTHOPAEDIC    
 GREAT L TOE BONE SPUR REMOVED Family History:  
Problem Relation Age of Onset  Heart Disease Mother  Hypertension Mother  Diabetes Mother  Cancer Mother 62  
  breast cancer  Breast Cancer Mother 50's  Cancer Father 61  
  lung cancer  Stroke Brother  Stroke Maternal Grandmother  Stroke Maternal Grandfather  Kidney Disease Brother  Hypertension Brother Social History Social History  Marital status: SINGLE Spouse name: N/A  
 Number of children: N/A  
 Years of education: N/A Occupational History  Not on file. Social History Main Topics  Smoking status: Former Smoker Quit date: 10/4/1991  Smokeless tobacco: Never Used  Alcohol use No  
 Drug use: No  
 Sexual activity: Not Currently Other Topics Concern  Not on file Social History Narrative ALLERGIES: Latex and Pcn [penicillins] Review of Systems Review of Systems Constitutional: (-) weight loss. HEENT: (-) stiff neck Eyes: (-) discharge. Respiratory: (-) cough. Cardiovascular: (-) syncope. Gastrointestinal: (-) blood in stool. Genitourinary: (-) hematuria. Musculoskeletal: (-) myalgias. Neurological: (-) seizure. Skin: (-) petechiae Lymph/Immunologic: (-) enlarged lymph nodes All other systems reviewed and are negative. Vitals:  
 09/25/18 1335 09/25/18 1338 BP: (!) 130/105 Pulse: 91   
Resp: 16 Temp: 100.1 °F (37.8 °C) SpO2: 100% 99% Weight: 87.1 kg (192 lb) Height: 5' 8\" (1.727 m) Physical Exam Nursing note and vitals reviewed. Constitutional: oriented to person, place, and time. appears chronically ill. No distress. Head: Normocephalic and atraumatic. Sclera anicteric Nose: No rhinorrhea Mouth/Throat: Oropharynx is clear and moist. Pharynx normal 
Eyes: Conjunctivae are normal. Pupils are equal, round, and reactive to light. Right eye exhibits no discharge. Left eye exhibits no discharge. Neck: Painless normal range of motion. Neck supple. No LAD. Cardiovascular: Normal rate, regular rhythm, normal heart sounds and intact distal pulses. Exam reveals no gallop and no friction rub. No murmur heard. Pulmonary/Chest:  No respiratory distress. No wheezes. No rales. No rhonchi. No increased work of breathing. No accessory muscle use. Good air exchange throughout. Abdominal: soft, non-tender, no rebound or guarding. No hepatosplenomegaly. Normal bowel sounds throughout. Back: no tenderness to palpation, no deformities, no CVA tenderness Extremities/Musculoskeletal: Normal range of motion. Tender to palpation over the lateral aspect of the distal R thigh with ecchymosis overlying that is mostly brown and green/yellow in color. Also with pain and swelling in the R calf. Distal extremities are neurovasc intact. Lymphadenopathy:   No adenopathy. Neurological:  Alert and oriented to person, place, and time. Coordination normal. CN 2-12 intact. Motor and sensory function intact. Skin: Skin is warm and dry. No rash noted. No pallor. MDM 61y F here with leg pain s/p fall 2 weeks ago. Now with increasing pain and swelling in the calf. Will check plain films of the entire leg and doppler of the RLE. ED Course Procedures

## 2018-09-25 NOTE — SENIOR SERVICES NOTE
physical Therapy Emergency Department EVALUATION/DISCHARGE Patient: Chepe Diaz (61 y.o. female) Date: 9/25/2018 Primary Diagnosis: Leg Pain Precautions:  Fall ASSESSMENT : 
Based on the objective data described below, the patient presents with increased RLE edema and posterior calf pain limiting patients RLE ROM, strength, and WB tolerance. Patient reports improvements in pain since arrival to ED but remains. She tolerated short distance ambulation but was unsteady and continues to demonstrate difficulty with WB RLE. She presents with an increased fall risk. Patient reports she is close to baseline and is requesting to return home. Discussed with patient the concerns about her returning home alone and she is insistent that she is able and feels safe. Patient is currently receiving  PT and OT services. Patient typically ambulates with cane, but used single crutch this visit. Encouraged her to continue to use crutch for increased support until [facility] PT recommends returning to use of cane. Reviewed home safety and fall prevention with patient, she verbalized understanding. She has multiple steps to enter home and reach living area. To safely return she will need assistance with transportation and stairs. Rounded with Case Management for assistance. Rounded with ED Physician, Dr Ever Ordaz, about above findings/recommendations and patients request to return home. Further acute physical therapy is not indicated at this time. PLAN : 
Discharge Recommendations:  
[x]   Home with continued [facility] services  
[]   Skilled nursing facility []   Admission to hospital with rehab likely needed 
[]   Inpatient rehab referral 
[]   Outpatient physical therapy referral 
[]   Other: Further Equipment Recommendations for Discharge:  
[]   Rolling walker with 5\" wheels [x]   Crutches (issued crutches as patients personal crutch in poor condition and unsafe for continued use) []   Yudith Sers  
[]   Wheelchair []   Other: COMMUNICATION/EDUCATION:  
Communication/Collaboration: 
[x]   Fall prevention education was provided and the patient/caregiver indicated understanding. [x]   Patient/family have participated as able and agree with findings and recommendations. []   Patient is unable to participate in plan of care at this time. Findings and recommendations were discussed with: ED Physician and ED Case Manager SUBJECTIVE:  
Patient stated I want to go home. I take care of myself.  OBJECTIVE DATA SUMMARY:  
HISTORY:   
Past Medical History:  
Diagnosis Date  Cancer ([address] UTERINE CA  
 Chronic pain  Hypercholesterolemia  Hypertension  Stroke [facility]) 9/2007 R SIDE AFFECTED Past Surgical History:  
Procedure Laterality Date  COLONOSCOPY,DIAGNOSTIC  4/23/2015  HX BREAST BIOPSY Right 1993  
 right breast:  benign  HX GI    
 COLONOSCOPY  
 HX ORTHOPAEDIC    
 GREAT L TOE BONE SPUR REMOVED Prior Level of Function/Home Situation: prior independence to modified independence. Patient does not drive, has transportation services for appointments Personal factors and/or comorbidities impacting plan of care:  
 
Home Situation Home Environment: Private residence # Steps to Enter: 14 
Rails to Enter: Yes Hand Rails : Left One/Two Story Residence: Two story # of Interior Steps: 15 Interior Rails: Left Living Alone: Yes Patient Expects to be Discharged to[de-identified] Private residence Current DME Used/Available at Home: [address], straight EXAMINATION/PRESENTATION/DECISION MAKING:  
Critical Behavior: 
 Alert and oriented Hearing: WFL Range Of Motion: 
AROM: Generally decreased, functional (except RUE non-functional; flexion contractur) Strength:   
Strength: Generally decreased, functional (except RUE non-functional) Tone & Sensation:  
Tone: Abnormal (RUE) Sensation: Impaired (reports fingers and toe tingling/numbness since recent chemo) Coordination: 
Coordination: Generally decreased, functional (except RUE non-functional) Vision:  
 Glasses Functional Mobility: 
Bed Mobility: 
Rolling: Independent Supine to Sit: Independent Sit to Supine: Independent Scooting: Independent Transfers: 
Sit to Stand: Contact guard assistance Stand to Sit: Contact guard assistance Balance:  
Sitting: Intact Standing: Impaired Standing - Static: Fair Standing - Dynamic : Fair Ambulation/Gait Training: 
Distance (ft): 12 Feet (ft) Assistive Device: Gait belt;Crutches (single crutch left) Ambulation - Level of Assistance: Contact guard assistance Gait Abnormalities: Decreased step clearance; Foot drop;Trunk sway increased Base of Support: Widened Speed/Veda: Pace decreased (<100 feet/min) Step Length: Right shortened;Left shortened Swing Pattern: Right asymmetrical 
  
  
  
  
 Slow, unsteady. Requires support of crutch for balance and to decrease RLE WB. Provided education on improved safety with use of crutch. Special Tests: 
Barthel Index: 
 
Bathin Bladder: 10 Bowels: 10 
Groomin Dressin Feedin Mobility: 0 (unable to tolerate distance) Stairs: 5 Toilet Use: 5 Transfer (Bed to Chair and Back): 10 Total: 55 Barthel and G-code impairment scale: 
Percentage of impairment CH 
0% CI 
1-19% CJ 
20-39% CK 
40-59% CL 
60-79% CM 
80-99% CN 
100% Barthel Score 0-100 100 99-80 79-60 59-40 20-39 1-19 
 0 Barthel Score 0-20 20 17-19 13-16 9-12 5-8 1-4 0 The Barthel ADL Index: Guidelines 1. The index should be used as a record of what a patient does, not as a record of what a patient could do. 2. The main aim is to establish degree of independence from any help, physical or verbal, however minor and for whatever reason. 3. The need for supervision renders the patient not independent. 4. A patient's performance should be established using the best available evidence. Asking the patient, friends/relatives and nurses are the usual sources, but direct observation and common sense are also important. However direct testing is not needed. 5. Usually the patient's performance over the preceding 24-48 hours is important, but occasionally longer periods will be relevant. 6. Middle categories imply that the patient supplies over 50 per cent of the effort. 7. Use of aids to be independent is allowed. Jaime Woodard., Barthel, D.W. (0984). Functional evaluation: the Barthel Index. [address] [facility] (14)2. Jeny Espinosa carina FERCHO KramerF, Gunnar Bryson., Gabriela Mejia., Candi, [address] (1999). Measuring the change indisability after inpatient rehabilitation; comparison of the responsiveness of the Barthel Index and Functional Stuart Measure. Journal of Neurology, Neurosurgery, and Psychiatry, 66(4), 672-233. Jesse Chiang, N.J.A, YOLANDA Elena, & Anh Joyner M.A. (2004.) Assessment of post-stroke quality of life in cost-effectiveness studies: The usefulness of the Barthel Index and the EuroQoL-5D. [facility], 13, 665-56 In compliance with CMSs Claims Based Outcome Reporting, the following G-code set was chosen for this patient based on their primary functional limitation being treated: The outcome measure chosen to determine the severity of the functional limitation was the Barthel Index with a score of 55/100 which was correlated with the impairment scale. ? Mobility - Walking and Moving Around:  
  - CURRENT STATUS: CK - 40%-59% impaired, limited or restricted  - GOAL STATUS: CK - 40%-59% impaired, limited or restricted  - D/C STATUS:  CK - 40%-59% impaired, limited or restricted Physical Therapy Evaluation Charge Determination History Examination Presentation Decision-Making MEDIUM  Complexity : 1-2 comorbidities / personal factors will impact the outcome/ POC  MEDIUM Complexity : 3 Standardized tests and measures addressing body structure, function, activity limitation and / or participation in recreation  MEDIUM Complexity : Evolving with changing characteristics  Other outcome measures Barthel Index  MEDIUM Based on the above components, the patient evaluation is determined to be of the following complexity level: MEDIUM Pain: 
Pain Scale 1: Numeric (0 - 10) Pain Intensity 1: 7 Pain Location 1: Leg; posterior calf Pain Orientation 1: Right Activity Tolerance:  
No s/s of VS distress After treatment:  
[]         Patient left in no apparent distress sitting up in chair 
[x]         Patient left in no apparent distress in bed 
[x]         Call bell left within reach [x]         Nursing notified 
[]         Caregiver present 
[]         Bed alarm activated Thank you for this referral. 
Anna Cisneros, PT, DPT Time Calculation: 35 mins

## 2018-09-25 NOTE — ED NOTES
3:54 PM 
Change of shift. Care of patient taken over from Dr. Mariana Lopez by Dr. Jefferson Waite; H&P reviewed, bedside handoff complete. Pending labwork and PT evaluation after ground level fall several weeks ago with decreased mobility. 5:29 PM 
Patient with unremarkable US and plain films, lab work without acute changes. PT evaluated the patient and found the patient able to ambulate, albeit with difficulty. I have offered the patient admission 2/2 her difficulty ambulating and that she lives alone, she has refused stating she would prefer to return home and follow up with her PCP. Return precautions offered.

## 2018-10-26 ENCOUNTER — HOSPITAL ENCOUNTER (OUTPATIENT)
Dept: INFUSION THERAPY | Age: 63
Discharge: HOME | End: 2018-10-26
Payer: MEDICARE

## 2018-10-26 VITALS
TEMPERATURE: 98 F | SYSTOLIC BLOOD PRESSURE: 136 MMHG | HEART RATE: 80 BPM | DIASTOLIC BLOOD PRESSURE: 83 MMHG | RESPIRATION RATE: 18 BRPM

## 2018-10-26 LAB
ALBUMIN SERPL-MCNC: 3.4 G/DL (ref 3.5–5)
ALBUMIN/GLOB SERPL: 0.8 {RATIO} (ref 1.1–2.2)
ALP SERPL-CCNC: 84 U/L (ref 45–117)
ALT SERPL-CCNC: 13 U/L (ref 12–78)
ANION GAP SERPL CALC-SCNC: 7 MMOL/L (ref 5–15)
AST SERPL-CCNC: 16 U/L (ref 15–37)
BASOPHILS # BLD: 0 K/UL (ref 0–0.1)
BASOPHILS NFR BLD: 0 % (ref 0–1)
BILIRUB SERPL-MCNC: 0.7 MG/DL (ref 0.2–1)
BUN SERPL-MCNC: 17 MG/DL (ref 6–20)
BUN/CREAT SERPL: 14 (ref 12–20)
CALCIUM SERPL-MCNC: 9.5 MG/DL (ref 8.5–10.1)
CANCER AG125 SERPL-ACNC: 23 U/ML (ref 1.5–35)
CHLORIDE SERPL-SCNC: 108 MMOL/L (ref 97–108)
CO2 SERPL-SCNC: 24 MMOL/L (ref 21–32)
CREAT SERPL-MCNC: 1.2 MG/DL (ref 0.55–1.02)
DIFFERENTIAL METHOD BLD: ABNORMAL
EOSINOPHIL # BLD: 0.4 K/UL (ref 0–0.4)
EOSINOPHIL NFR BLD: 7 % (ref 0–7)
ERYTHROCYTE [DISTWIDTH] IN BLOOD BY AUTOMATED COUNT: 16.4 % (ref 11.5–14.5)
GLOBULIN SER CALC-MCNC: 4.1 G/DL (ref 2–4)
GLUCOSE SERPL-MCNC: 99 MG/DL (ref 65–100)
HCT VFR BLD AUTO: 31.8 % (ref 35–47)
HGB BLD-MCNC: 9.5 G/DL (ref 11.5–16)
IMM GRANULOCYTES # BLD: 0 K/UL (ref 0–0.04)
IMM GRANULOCYTES NFR BLD AUTO: 0 % (ref 0–0.5)
LYMPHOCYTES # BLD: 0.6 K/UL (ref 0.8–3.5)
LYMPHOCYTES NFR BLD: 11 % (ref 12–49)
MAGNESIUM SERPL-MCNC: 1.8 MG/DL (ref 1.6–2.4)
MCH RBC QN AUTO: 30.6 PG (ref 26–34)
MCHC RBC AUTO-ENTMCNC: 29.9 G/DL (ref 30–36.5)
MCV RBC AUTO: 102.6 FL (ref 80–99)
MONOCYTES # BLD: 0.6 K/UL (ref 0–1)
MONOCYTES NFR BLD: 11 % (ref 5–13)
NEUTS SEG # BLD: 4 K/UL (ref 1.8–8)
NEUTS SEG NFR BLD: 71 % (ref 32–75)
NRBC # BLD: 0 K/UL (ref 0–0.01)
NRBC BLD-RTO: 0 PER 100 WBC
PLATELET # BLD AUTO: 335 K/UL (ref 150–400)
PMV BLD AUTO: 9.4 FL (ref 8.9–12.9)
POTASSIUM SERPL-SCNC: 4.2 MMOL/L (ref 3.5–5.1)
PROT SERPL-MCNC: 7.5 G/DL (ref 6.4–8.2)
RBC # BLD AUTO: 3.1 M/UL (ref 3.8–5.2)
RBC MORPH BLD: ABNORMAL
SODIUM SERPL-SCNC: 139 MMOL/L (ref 136–145)
WBC # BLD AUTO: 5.6 K/UL (ref 3.6–11)

## 2018-10-26 PROCEDURE — 83735 ASSAY OF MAGNESIUM: CPT | Performed by: OBSTETRICS & GYNECOLOGY

## 2018-10-26 PROCEDURE — 85025 COMPLETE CBC W/AUTO DIFF WBC: CPT | Performed by: OBSTETRICS & GYNECOLOGY

## 2018-10-26 PROCEDURE — 80053 COMPREHEN METABOLIC PANEL: CPT | Performed by: OBSTETRICS & GYNECOLOGY

## 2018-10-26 PROCEDURE — 77030012965 HC NDL HUBR BBMI -A

## 2018-10-26 PROCEDURE — 86304 IMMUNOASSAY TUMOR CA 125: CPT | Performed by: OBSTETRICS & GYNECOLOGY

## 2018-10-26 PROCEDURE — 36591 DRAW BLOOD OFF VENOUS DEVICE: CPT

## 2018-10-26 PROCEDURE — 36415 COLL VENOUS BLD VENIPUNCTURE: CPT | Performed by: OBSTETRICS & GYNECOLOGY

## 2018-10-26 NOTE — PROGRESS NOTES
Outpatient Infusion Center Short Visit Progress Note 1100 Pt admit to [facility] for Port flush and labs ambulatory in stable condition. Assessment completed. No new concerns voiced. Please review pending lab results in [address] [place] [address]. Patient Vitals for the past 12 hrs: 
 Temp Pulse Resp BP  
10/26/18 1100 98 °F (36.7 °C) 80 18 136/83 Port with positive blood return. Lab drawn, flushed, heparinized and de-accessed per protocol. Medications: 
None 1115 Pt tolerated treatment well. D/c home ambulatory in no distress. Pt aware of next appointment scheduled for 12/7/18.

## 2018-10-29 ENCOUNTER — TELEPHONE (OUTPATIENT)
Dept: GYNECOLOGY | Age: 63
End: 2018-10-29

## 2018-11-08 ENCOUNTER — TELEPHONE (OUTPATIENT)
Dept: ONCOLOGY | Age: 63
End: 2018-11-08

## 2018-11-08 ENCOUNTER — DOCUMENTATION ONLY (OUTPATIENT)
Dept: ONCOLOGY | Age: 63
End: 2018-11-08

## 2018-11-08 NOTE — PROGRESS NOTES
This Survivorship Care Plan is a cancer treatment summary and follow up plan and is provided to you to keep with your evelin plan records and to share with your primary care provider or any of your doctors and nurses. This summary is a brief record of major aspects of your cancer treatment and not a detailed or comprehensive record of your care. You should review this with your cancer provider.     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   Stage:  1A     Treatment   Surgery Yes Surgery Date(s) (year):      Surgical procedure/location/findings: Robotic-assisted total laparoscopic hysterectomy and bilateral salpingo-oophorectomy, laparoscopic sentinel pelvic lymph node dissection and omentectomy, myometrial invasion present, depth of invasion: 3 mm, percentage of myometrial invasion: 12%, uterine serosa involvement: not identified, cervical stromal involvement: not identified, other tissue/organ involvement: not identified, lymphovascular invasion: present; 4 lymph nodes removed--all were negative for cancer     Radiation Yes   Body area treated: Pelvis   Systemic Therapy (chemotherapy, hormonal therapy, other) Yes   Names of Agents Used   Carboplatin                                                           Completed in 2018   Paclitaxel                                                              Completed in 2018     Persistent symptoms or side effects at completion of treatment: Yes (enter type(s)) : fatigue       Familial Cancer Risk Assessment Genetic/hereditary risk factor(s) or predisposing conditions: none apparent     Genetic counseling: No                         Genetic testing: No     Follow-up Care Plan   Need for ongoing (adjuvant) treatment for cancer   No     Schedule of clinical visits   Coordinating Provider When/How often   Primary Care Provider For all non-cancer-related health care     GYN Oncologist Every 3 to 6 months for 2 years, every 6 to 12 months for years 3-5, annually after this--schedule depends on cancer stage (1-3) and your physician's recommendations     Radiation Oncologist                     As she recommends      Coordinating Provider What/When/How Often   Dr. Yury Johnson Every 3 to 6 months for 2 years, every 6 to 12 months for years 3-5, annually after this--frequency depends on stage of cancer and your doctor's recommendations for you   Please continue to see your primary care provider for all general health care recommended for a (man) (woman) your age, including cancer screening tests. Any symptoms should be brought to the attention of your provider:   1. Anything that represents a brand new symptom;  2. Anything that represents a persistent symptom;  3. Anything you are worried about that might be related to the cancer coming back. Possible late- and long-term effects that someone with this type of cancer and treatment may experience: Hot flashes, night sweats, vaginal dryness, varginal scarring (at top of vagina), leg swelling (may be minimal to pronounced), neuropathy     Cancer survivors may experience issues with the areas listed below. If you have any concerns in these or other areas, please speak with your doctors or nurses to find out how you can get help with them.   Emotional and mental health        Fatigue                                          Weight changes             Stopping smoking                         Physical Functioning                      Insurance               School/Work Financial advice or assistance          Memory or concentration loss      Parenting                                       Fertility                             Sexual functioning  Other      A number of lifestyle/behaviors can affect your ongoing health, including the risk for the cancer coming back or developing another cancer. Discuss these recommendations with your doctor or nurse: Tobacco use/cessation                    Diet                                         Alcohol use                           Sun screen use                               Weight management (loss/gain)                                           Physical activity     Resources you may be interested in: www.cancer. net       Other comments:       Prepared by:  TED Zhang, Cancer Survivorship Nurse  [facility] [phone]                                                                                    Delivered on: 11/8/18

## 2018-11-08 NOTE — TELEPHONE ENCOUNTER
Unable to reach her today but in the message left I described what is included in the survivorship care plan.  I also encouraged her to take it to her next appt with her GYN oncologist if she has any questions about it or if she would like to review the information in general.  Also, encouraged her to contact me if she or her family members need additional support, education or area resources, or have any follow-up questions regarding this message.  Left my contact phone #s for her use.

## 2018-12-07 ENCOUNTER — HOSPITAL ENCOUNTER (OUTPATIENT)
Dept: INFUSION THERAPY | Age: 63
Discharge: HOME | End: 2018-12-07
Payer: MEDICARE

## 2018-12-07 VITALS
DIASTOLIC BLOOD PRESSURE: 74 MMHG | TEMPERATURE: 98 F | HEART RATE: 77 BPM | RESPIRATION RATE: 18 BRPM | SYSTOLIC BLOOD PRESSURE: 112 MMHG

## 2018-12-07 LAB
ALBUMIN SERPL-MCNC: 3.8 G/DL (ref 3.5–5)
ALBUMIN/GLOB SERPL: 1.1 {RATIO} (ref 1.1–2.2)
ALP SERPL-CCNC: 107 U/L (ref 45–117)
ALT SERPL-CCNC: 13 U/L (ref 12–78)
ANION GAP SERPL CALC-SCNC: 8 MMOL/L (ref 5–15)
AST SERPL-CCNC: 13 U/L (ref 15–37)
BASOPHILS # BLD: 0 K/UL (ref 0–0.1)
BASOPHILS NFR BLD: 0 % (ref 0–1)
BILIRUB SERPL-MCNC: 1 MG/DL (ref 0.2–1)
BUN SERPL-MCNC: 18 MG/DL (ref 6–20)
BUN/CREAT SERPL: 15 (ref 12–20)
CALCIUM SERPL-MCNC: 9.3 MG/DL (ref 8.5–10.1)
CANCER AG125 SERPL-ACNC: 19 U/ML (ref 1.5–35)
CHLORIDE SERPL-SCNC: 105 MMOL/L (ref 97–108)
CO2 SERPL-SCNC: 26 MMOL/L (ref 21–32)
CREAT SERPL-MCNC: 1.17 MG/DL (ref 0.55–1.02)
DIFFERENTIAL METHOD BLD: ABNORMAL
EOSINOPHIL # BLD: 0.6 K/UL (ref 0–0.4)
EOSINOPHIL NFR BLD: 10 % (ref 0–7)
ERYTHROCYTE [DISTWIDTH] IN BLOOD BY AUTOMATED COUNT: 14.9 % (ref 11.5–14.5)
GLOBULIN SER CALC-MCNC: 3.5 G/DL (ref 2–4)
GLUCOSE SERPL-MCNC: 93 MG/DL (ref 65–100)
HCT VFR BLD AUTO: 36.1 % (ref 35–47)
HGB BLD-MCNC: 11.1 G/DL (ref 11.5–16)
IMM GRANULOCYTES # BLD: 0 K/UL (ref 0–0.04)
IMM GRANULOCYTES NFR BLD AUTO: 0 % (ref 0–0.5)
LYMPHOCYTES # BLD: 0.6 K/UL (ref 0.8–3.5)
LYMPHOCYTES NFR BLD: 11 % (ref 12–49)
MAGNESIUM SERPL-MCNC: 1.9 MG/DL (ref 1.6–2.4)
MCH RBC QN AUTO: 30.4 PG (ref 26–34)
MCHC RBC AUTO-ENTMCNC: 30.7 G/DL (ref 30–36.5)
MCV RBC AUTO: 98.9 FL (ref 80–99)
MONOCYTES # BLD: 0.5 K/UL (ref 0–1)
MONOCYTES NFR BLD: 8 % (ref 5–13)
NEUTS SEG # BLD: 4.2 K/UL (ref 1.8–8)
NEUTS SEG NFR BLD: 71 % (ref 32–75)
NRBC # BLD: 0 K/UL (ref 0–0.01)
NRBC BLD-RTO: 0 PER 100 WBC
PLATELET # BLD AUTO: 305 K/UL (ref 150–400)
PMV BLD AUTO: 9.8 FL (ref 8.9–12.9)
POTASSIUM SERPL-SCNC: 4.3 MMOL/L (ref 3.5–5.1)
PROT SERPL-MCNC: 7.3 G/DL (ref 6.4–8.2)
RBC # BLD AUTO: 3.65 M/UL (ref 3.8–5.2)
RBC MORPH BLD: ABNORMAL
SODIUM SERPL-SCNC: 139 MMOL/L (ref 136–145)
WBC # BLD AUTO: 5.9 K/UL (ref 3.6–11)

## 2018-12-07 PROCEDURE — 36415 COLL VENOUS BLD VENIPUNCTURE: CPT

## 2018-12-07 PROCEDURE — 85025 COMPLETE CBC W/AUTO DIFF WBC: CPT

## 2018-12-07 PROCEDURE — 83735 ASSAY OF MAGNESIUM: CPT

## 2018-12-07 PROCEDURE — 36591 DRAW BLOOD OFF VENOUS DEVICE: CPT

## 2018-12-07 PROCEDURE — 80053 COMPREHEN METABOLIC PANEL: CPT

## 2018-12-07 PROCEDURE — 86304 IMMUNOASSAY TUMOR CA 125: CPT

## 2018-12-07 NOTE — PROGRESS NOTES
Outpatient Infusion Center - Chemotherapy Progress Note 1020 Pt admit to [facility] for port flush/labs ambulatory with walker in stable condition. Assessment completed. No new concerns voiced. Port accessed with positive blood return; insufficient for labs; line flushed, heparinized and de-accessed. Labs drawn peripherally and sent for processing. Visit Vitals /74 Pulse 77 Temp 98 °F (36.7 °C) Resp 18 LMP 01/09/2011 Breastfeeding? No  
 
 
Medications: 
NS flush Heparin flush 
 
1100 Pt tolerated treatment well. D/c home ambulatory with walker in no distress. Pt aware of next [facility] appointment scheduled for 02/01/2019. Please see CC for labs once resulted.

## 2018-12-10 PROCEDURE — 77030012965 HC NDL HUBR BBMI -A

## 2019-01-31 ENCOUNTER — OFFICE VISIT (OUTPATIENT)
Dept: GYNECOLOGY | Age: 64
End: 2019-01-31

## 2019-01-31 VITALS
BODY MASS INDEX: 28.34 KG/M2 | DIASTOLIC BLOOD PRESSURE: 85 MMHG | SYSTOLIC BLOOD PRESSURE: 129 MMHG | HEART RATE: 64 BPM | WEIGHT: 187 LBS | HEIGHT: 68 IN

## 2019-01-31 DIAGNOSIS — R30.0 DYSURIA: ICD-10-CM

## 2019-01-31 DIAGNOSIS — C55 UTERINE CARCINOSARCOMA (HCC): Primary | ICD-10-CM

## 2019-01-31 RX ORDER — LANOLIN ALCOHOL/MO/W.PET/CERES
CREAM (GRAM) TOPICAL
Refills: 0 | COMMUNITY
Start: 2018-12-14

## 2019-01-31 NOTE — PROGRESS NOTES
[address] [place] [address] Mathias Moritz [address], [address]  P [phone]  F [phone]    Office Note  Patient ID:  Name:  Kelly Farr  MRN:  [id number]  :  1955/64 y.o. Date:  2019      HISTORY OF PRESENT ILLNESS:  Luz Roberto is a 59 y.o.  postmenopausal female with a diagnosis of stage IA uterine carcinosarcoma. She underwent robotic hysterectomy with staging in late 2018. Pathology revealed LVSI but her sentinel nodes were negative. Pelvic washings were also negative. She was recommended adjuvant chemotherapy with Taxol/Carboplatin due to her risk of recurrence. She completed her 6 prescribed cycles of chemotherapy. She subsequently received pelvic radiation therapy with brachytherapy. She presents today for follow up. She has some urinary symptoms, but no GI complaints. She has no pain. Denies vaginal bleeding or discharge. ROS:   and GI review:  Negative  Cardiopulmonary review:  Negative   Musculoskeletal:  Negative    A comprehensive review of systems was negative except for that written in the History of Present Illness. , 10 point ROS        Problem List:  Patient Active Problem List    Diagnosis Date Noted    Anemia 2018    Severe obesity (BMI 35.0-39. 9) with comorbidity ([address] 2018    Elevated serum creatinine 2018    Uterine carcinosarcoma ([address] 2018    Obesity (BMI 30.0-34.9) 2018    Hypertension 2017    Chronic lung disease 2017    Stroke ([address] 2017     PMH:  Past Medical History:   Diagnosis Date    Cancer ([address]     UTERINE CA    Chronic pain     Hypercholesterolemia     Hypertension     Stroke ([address] 2007    R SIDE AFFECTED      PSH:  Past Surgical History:   Procedure Laterality Date    COLONOSCOPY,DIAGNOSTIC  2015         HX BREAST BIOPSY Right 1993    right breast:  benign    HX GI      COLONOSCOPY    HX ORTHOPAEDIC      GREAT L TOE BONE SPUR REMOVED      Social History:  Social History     Tobacco Use    Smoking status: Former Smoker     Last attempt to quit: 10/4/1991     Years since quittin.3    Smokeless tobacco: Never Used   Substance Use Topics    Alcohol use: No      Family History:  Family History   Problem Relation Age of Onset    Heart Disease Mother     Hypertension Mother     Diabetes Mother     Cancer Mother 62        breast cancer    Breast Cancer Mother         52's    Cancer Father 61        lung cancer    Stroke Brother     Stroke Maternal Grandmother     Stroke Maternal Grandfather     Kidney Disease Brother     Hypertension Brother       Medications: (reviewed)  Current Outpatient Medications   Medication Sig    ferrous sulfate 325 mg (65 mg iron) tablet TAKE 1 TABLET A DAY    amLODIPine (NORVASC) 5 mg tablet     SENNA-S 8.6-50 mg per tablet TK 1 T PO BID PRN    LOW-DOSE ASPIRIN PO Take  by mouth.  lidocaine-prilocaine (EMLA) topical cream Apply small amount over port area one hour before chemo treatment and cover with a Band-Aid    meclizine (ANTIVERT) 25 mg tablet Take 25 mg by mouth as needed.  traMADol (ULTRAM) 50 mg tablet Take 50 mg by mouth nightly.  aspirin (ASPIRIN) 325 mg tablet Take 325 mg by mouth daily.  atorvastatin (LIPITOR) 10 mg tablet Take 10 mg by mouth daily.  clopidogrel (PLAVIX) 75 mg tablet Take 75 mg by mouth daily.  metoprolol (LOPRESSOR) 50 mg tablet Take 50 mg by mouth two (2) times a day.  oxybutynin (DITROPAN) 5 mg tablet Take 15 mg by mouth two (2) times a day.  amLODIPine (NORVASC) 10 mg tablet Take 10 mg by mouth daily. Indications: pt states taking 1/2 tablet daily    gabapentin (NEURONTIN) 300 mg capsule Take 300 mg by mouth. No current facility-administered medications for this visit. Allergies: (reviewed)  Allergies   Allergen Reactions    Latex Itching    Pcn [Penicillins] Unknown (comments)     Pt's states she doesn't know reaction. OBJECTIVE:    Physical Exam:  VITAL SIGNS: Vitals:    01/31/19 1050 01/31/19 1101   BP: 133/90 129/85   Pulse: 64 64   Weight: 187 lb (84.8 kg)    Height: 5' 7.99\" (1.727 m)      Body mass index is 28.44 kg/m². GENERAL MELVIN: Conversant, alert, oriented. No acute distress. HEENT: HEENT. No thyroid enlargement. No JVD. Neck: Supple without restrictions. RESPIRATORY: Clear to auscultation and percussion to the bases. No CVAT. CARDIOVASC: RRR without murmur/rub. GASTROINT: soft, non-tender, without masses or organomegaly   MUSCULOSKEL: no joint tenderness, deformity or swelling   EXTREMITIES: extremities normal, atraumatic, no cyanosis or edema   PELVIC: Deferred   RECTAL: Deferred   JOHN SURVEY: No suspicious lymphadenopathy or edema noted. NEURO: Grossly intact. No acute deficit. Lab Results   Component Value Date/Time    WBC 5.9 12/07/2018 10:31 AM    HGB 11.1 (L) 12/07/2018 10:31 AM    HCT 36.1 12/07/2018 10:31 AM    PLATELET 439 58/89/4154 10:31 AM    MCV 98.9 12/07/2018 10:31 AM     Lab Results   Component Value Date/Time    Sodium 139 12/07/2018 10:31 AM    Potassium 4.3 12/07/2018 10:31 AM    Chloride 105 12/07/2018 10:31 AM    CO2 26 12/07/2018 10:31 AM    Anion gap 8 12/07/2018 10:31 AM    Glucose 93 12/07/2018 10:31 AM    BUN 18 12/07/2018 10:31 AM    Creatinine 1.17 (H) 12/07/2018 10:31 AM    BUN/Creatinine ratio 15 12/07/2018 10:31 AM    GFR est AA 57 (L) 12/07/2018 10:31 AM    GFR est non-AA 47 (L) 12/07/2018 10:31 AM    Calcium 9.3 12/07/2018 10:31 AM     Lab Results   Component Value Date/Time    CA-125 19 12/07/2018 10:31 AM    Cancer Ag (CA) 125 27.0 05/21/2018 11:04 AM       PET/CT (7/23/18)  HEAD/NECK: No apparent foci of abnormal hypermetabolism. Cerebral evaluation is  limited by normal intense activity. Old left MCA infarct is noted.     CHEST: No foci of abnormal hypermetabolism.     ABDOMEN/PELVIS: No foci of abnormal hypermetabolism.  Left adrenal nodules are  stable and do not demonstrate increased tracer activity. The uterus is  surgically absent.     SKELETON: No foci of abnormal hypermetabolism in the axial and visualized  appendicular skeleton.     IMPRESSION: No Foci of Abnormal Hypermetabolism. IMPRESSION/PLAN:  Luz Hollingsworth is a 59 y.o. female with a diagnosis of stage IA uterine carcinosarcoma. Following surgical resection she received 6 cycles of Taxol/Carboplatin chemotherapy, followed by pelvic radiation therapy. She has no evidence of disease on today's exam.  I will see her back in 3 months for continued surveillance.         Signed By: Carlee Lees MD     1/31/2019/12:10 PM

## 2019-02-01 ENCOUNTER — HOSPITAL ENCOUNTER (OUTPATIENT)
Dept: INFUSION THERAPY | Age: 64
Discharge: HOME | End: 2019-02-01
Payer: MEDICARE

## 2019-02-01 LAB — CANCER AG125 SERPL-ACNC: 16 U/ML (ref 1.5–35)

## 2019-02-01 PROCEDURE — 74011000250 HC RX REV CODE- 250: Performed by: OBSTETRICS & GYNECOLOGY

## 2019-02-01 PROCEDURE — 86304 IMMUNOASSAY TUMOR CA 125: CPT

## 2019-02-01 PROCEDURE — 77030012965 HC NDL HUBR BBMI -A

## 2019-02-01 PROCEDURE — 74011250636 HC RX REV CODE- 250/636: Performed by: OBSTETRICS & GYNECOLOGY

## 2019-02-01 PROCEDURE — 36591 DRAW BLOOD OFF VENOUS DEVICE: CPT

## 2019-02-01 PROCEDURE — 36415 COLL VENOUS BLD VENIPUNCTURE: CPT

## 2019-02-01 RX ORDER — SODIUM CHLORIDE 9 MG/ML
10 INJECTION INTRAMUSCULAR; INTRAVENOUS; SUBCUTANEOUS AS NEEDED
Status: ACTIVE | OUTPATIENT
Start: 2019-02-01 | End: 2019-02-02

## 2019-02-01 RX ORDER — HEPARIN 100 UNIT/ML
500 SYRINGE INTRAVENOUS AS NEEDED
Status: ACTIVE | OUTPATIENT
Start: 2019-02-01 | End: 2019-02-02

## 2019-02-01 RX ORDER — SODIUM CHLORIDE 0.9 % (FLUSH) 0.9 %
5-10 SYRINGE (ML) INJECTION AS NEEDED
Status: ACTIVE | OUTPATIENT
Start: 2019-02-01 | End: 2019-02-02

## 2019-02-01 RX ADMIN — Medication 500 UNITS: at 11:31

## 2019-02-01 RX ADMIN — Medication 10 ML: at 11:30

## 2019-02-01 RX ADMIN — SODIUM CHLORIDE 10 ML: 9 INJECTION, SOLUTION INTRAMUSCULAR; INTRAVENOUS; SUBCUTANEOUS at 11:25

## 2019-02-01 NOTE — PROGRESS NOTES
\[facility]\"" Lab Visit: 
 
5618:  Pt arrived ambulatory and in no distress, port accessed per protocol, labs drawn and sent for processing, port flushed, heparinized and de-accessed per protocol. Departed \[facility]\"" ambulatory and in no distress. Visit Vitals BP (P) 126/62 Pulse (P) 66 Temp (P) 98.9 °F (37.2 °C) Resp (P) 18 LMP 01/09/2011 Breastfeeding? No  
 
 
Labs available in CC once resulted.

## 2019-02-02 LAB
APPEARANCE UR: ABNORMAL
BACTERIA #/AREA URNS HPF: ABNORMAL /[HPF]
BILIRUB UR QL STRIP: NEGATIVE
CASTS URNS QL MICRO: ABNORMAL /LPF
COLOR UR: YELLOW
EPI CELLS #/AREA URNS HPF: ABNORMAL /HPF
GLUCOSE UR QL: NEGATIVE
HGB UR QL STRIP: ABNORMAL
KETONES UR QL STRIP: NEGATIVE
LEUKOCYTE ESTERASE UR QL STRIP: ABNORMAL
MICRO URNS: ABNORMAL
MUCOUS THREADS URNS QL MICRO: PRESENT
NITRITE UR QL STRIP: NEGATIVE
PH UR STRIP: 6 [PH] (ref 5–7.5)
PROT UR QL STRIP: ABNORMAL
RBC #/AREA URNS HPF: ABNORMAL /HPF
SP GR UR: 1.02 (ref 1–1.03)
UROBILINOGEN UR STRIP-MCNC: 0.2 MG/DL (ref 0.2–1)
WBC #/AREA URNS HPF: >30 /HPF

## 2019-02-06 ENCOUNTER — TELEPHONE (OUTPATIENT)
Dept: GYNECOLOGY | Age: 64
End: 2019-02-06

## 2019-02-06 RX ORDER — SULFAMETHOXAZOLE AND TRIMETHOPRIM 400; 80 MG/1; MG/1
1 TABLET ORAL 2 TIMES DAILY
Qty: 10 TAB | Refills: 0 | Status: SHIPPED | OUTPATIENT
Start: 2019-02-06 | End: 2019-02-11

## 2019-02-06 NOTE — TELEPHONE ENCOUNTER
Patient request a note to be sent to her rehabilitation counselor for her to return to work part time. She also states she is still having burning with urination. I advised the patient it appears she does have a uti and I will consult with  on what he would like her to take.

## 2019-03-06 ENCOUNTER — TELEPHONE (OUTPATIENT)
Dept: GYNECOLOGY | Age: 64
End: 2019-03-06

## 2019-03-06 DIAGNOSIS — N90.89 VULVAR IRRITATION: Primary | ICD-10-CM

## 2019-03-07 RX ORDER — NYSTATIN AND TRIAMCINOLONE ACETONIDE 100000; 1 [USP'U]/G; MG/G
CREAM TOPICAL 2 TIMES DAILY
Qty: 30 G | Refills: 2 | Status: SHIPPED | OUTPATIENT
Start: 2019-03-07 | End: 2020-02-13

## 2019-03-07 NOTE — TELEPHONE ENCOUNTER
Called the pt and left a message that  recommends Nystatin. Advised I will send to her pharmacy on file and to call with further questions.

## 2019-03-07 NOTE — TELEPHONE ENCOUNTER
The patient is requesting a refill of the prescription for the irritation. I advised her that is for a urinary tract infection. She reports itching and irritation in the vulva and clitoral area. She denies bleeding, discharge, and urinary symptoms. Please advise if you would like to prescribe something else.

## 2019-03-29 ENCOUNTER — OFFICE VISIT (OUTPATIENT)
Dept: NEUROLOGY | Age: 64
End: 2019-03-29

## 2019-03-29 ENCOUNTER — HOSPITAL ENCOUNTER (OUTPATIENT)
Dept: INFUSION THERAPY | Age: 64
Discharge: HOME | End: 2019-03-29
Payer: MEDICARE

## 2019-03-29 VITALS
BODY MASS INDEX: 29.03 KG/M2 | OXYGEN SATURATION: 98 % | HEIGHT: 67 IN | DIASTOLIC BLOOD PRESSURE: 80 MMHG | SYSTOLIC BLOOD PRESSURE: 122 MMHG | WEIGHT: 185 LBS | HEART RATE: 83 BPM

## 2019-03-29 VITALS
TEMPERATURE: 97.3 F | RESPIRATION RATE: 18 BRPM | SYSTOLIC BLOOD PRESSURE: 123 MMHG | DIASTOLIC BLOOD PRESSURE: 85 MMHG | HEART RATE: 85 BPM

## 2019-03-29 DIAGNOSIS — I63.412 STROKE DUE TO EMBOLISM OF LEFT MIDDLE CEREBRAL ARTERY (HCC): Primary | ICD-10-CM

## 2019-03-29 LAB — CANCER AG125 SERPL-ACNC: 12 U/ML (ref 1.5–35)

## 2019-03-29 PROCEDURE — 77030012965 HC NDL HUBR BBMI -A

## 2019-03-29 PROCEDURE — 36591 DRAW BLOOD OFF VENOUS DEVICE: CPT

## 2019-03-29 PROCEDURE — 86304 IMMUNOASSAY TUMOR CA 125: CPT

## 2019-03-29 PROCEDURE — 36415 COLL VENOUS BLD VENIPUNCTURE: CPT

## 2019-03-29 NOTE — PATIENT INSTRUCTIONS
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
## 2019-03-29 NOTE — PROGRESS NOTES
Outpatient Infusion Center - Chemotherapy Progress Note [phone] Pt admit to [facility] for port flush/labs ambulatory in stable condition. Assessment completed. No new concerns voiced. Port accessed with positive blood return. Labs drawn per order and sent. Line flushed, heparinized and de-accessed. Visit Vitals /85 Pulse 85 Temp 97.3 °F (36.3 °C) Resp 18 LMP 01/09/2011 Breastfeeding? No  
 
 
Medications: 
NS flush Heparin flush 1130 Pt tolerated treatment well. D/c home ambulatory in no distress. Pt aware of next [facility] appointment scheduled for 05/24/2019. Please review labs in CC once resulted.

## 2019-04-02 ENCOUNTER — DOCUMENTATION ONLY (OUTPATIENT)
Dept: NEUROLOGY | Age: 64
End: 2019-04-02

## 2019-04-30 ENCOUNTER — OFFICE VISIT (OUTPATIENT)
Dept: GYNECOLOGY | Age: 64
End: 2019-04-30

## 2019-04-30 ENCOUNTER — HOSPITAL ENCOUNTER (OUTPATIENT)
Dept: MAMMOGRAPHY | Age: 64
Discharge: HOME | End: 2019-04-30
Attending: INTERNAL MEDICINE
Payer: MEDICARE

## 2019-04-30 VITALS
HEART RATE: 62 BPM | DIASTOLIC BLOOD PRESSURE: 75 MMHG | SYSTOLIC BLOOD PRESSURE: 128 MMHG | WEIGHT: 187 LBS | HEIGHT: 67 IN | BODY MASS INDEX: 29.35 KG/M2

## 2019-04-30 DIAGNOSIS — C55 UTERINE CARCINOSARCOMA (HCC): Primary | ICD-10-CM

## 2019-04-30 DIAGNOSIS — Z12.39 SCREENING BREAST EXAMINATION: ICD-10-CM

## 2019-04-30 PROCEDURE — 77067 SCR MAMMO BI INCL CAD: CPT

## 2019-04-30 NOTE — PROGRESS NOTES
[address] [place] [address] [address] [facility] [phone]  F [phone]    Office Note  Patient ID:  Name:  Annia Leavitt  MRN:  [id number]  :  1955/64 y.o. Date:  2019      HISTORY OF PRESENT ILLNESS:  Luz Hicks is a 59 y.o.  postmenopausal female with a diagnosis of stage IA uterine carcinosarcoma. She underwent robotic hysterectomy with staging in late 2018. Pathology revealed LVSI but her sentinel nodes were negative. Pelvic washings were also negative. She was recommended adjuvant chemotherapy with Taxol/Carboplatin due to her risk of recurrence. She completed her 6 prescribed cycles of chemotherapy. She subsequently received pelvic radiation therapy with brachytherapy. She presents today for follow up. She has some urinary symptoms, but no GI complaints. She has no pain. Denies vaginal bleeding or discharge. ROS:   and GI review:  Negative  Cardiopulmonary review:  Negative   Musculoskeletal:  Negative    A comprehensive review of systems was negative except for that written in the History of Present Illness. , 10 point ROS        Problem List:  Patient Active Problem List    Diagnosis Date Noted    Anemia 2018    Severe obesity (BMI 35.0-39. 9) with comorbidity ([address] 2018    Elevated serum creatinine 2018    Uterine carcinosarcoma ([address] 2018    Obesity (BMI 30.0-34.9) 2018    Hypertension 2017    Chronic lung disease 2017    Stroke ([address] 2017     PMH:  Past Medical History:   Diagnosis Date    Cancer ([address]     UTERINE CA    Chronic pain     Hypercholesterolemia     Hypertension     Stroke ([address] 2007    R SIDE AFFECTED      PSH:  Past Surgical History:   Procedure Laterality Date    COLONOSCOPY,DIAGNOSTIC  2015         HX BREAST BIOPSY Right 1993    right breast:  benign    HX GI      COLONOSCOPY    HX ORTHOPAEDIC      GREAT L TOE BONE SPUR REMOVED      Social History:  Social History     Tobacco Use    Smoking status: Former Smoker     Last attempt to quit: 10/4/1991     Years since quittin.5    Smokeless tobacco: Never Used   Substance Use Topics    Alcohol use: No      Family History:  Family History   Problem Relation Age of Onset    Heart Disease Mother     Hypertension Mother     Diabetes Mother     Cancer Mother 62        breast cancer    Breast Cancer Mother         52's    Cancer Father 61        lung cancer    Stroke Brother     Stroke Maternal Grandmother     Stroke Maternal Grandfather     Kidney Disease Brother     Hypertension Brother       Medications: (reviewed)  Current Outpatient Medications   Medication Sig    nystatin-triamcinolone (MYCOLOG II) topical cream Apply  to affected area two (2) times a day.  ferrous sulfate 325 mg (65 mg iron) tablet TAKE 1 TABLET A DAY    SENNA-S 8.6-50 mg per tablet TK 1 T PO daily    lidocaine-prilocaine (EMLA) topical cream Apply small amount over port area one hour before chemo treatment and cover with a Band-Aid    amLODIPine (NORVASC) 10 mg tablet Take 10 mg by mouth daily.  meclizine (ANTIVERT) 25 mg tablet Take 25 mg by mouth as needed.  traMADol (ULTRAM) 50 mg tablet Take 50 mg by mouth nightly.  aspirin (ASPIRIN) 325 mg tablet Take 325 mg by mouth daily.  atorvastatin (LIPITOR) 10 mg tablet Take 10 mg by mouth daily.  clopidogrel (PLAVIX) 75 mg tablet Take 75 mg by mouth daily.  metoprolol (LOPRESSOR) 50 mg tablet Take 50 mg by mouth two (2) times a day.  oxybutynin (DITROPAN) 5 mg tablet Take 15 mg by mouth two (2) times a day. No current facility-administered medications for this visit. Allergies: (reviewed)  Allergies   Allergen Reactions    Latex Itching    Pcn [Penicillins] Unknown (comments)     Pt's states she doesn't know reaction.           OBJECTIVE:    Physical Exam:  VITAL SIGNS: Vitals:    19 1120   BP: 128/75 Pulse: 62   Weight: 187 lb (84.8 kg)   Height: 5' 7\" (1.702 m)     Body mass index is 29.29 kg/m². GENERAL MELVIN: Conversant, alert, oriented. No acute distress. HEENT: HEENT. No thyroid enlargement. No JVD. Neck: Supple without restrictions. RESPIRATORY: Clear to auscultation and percussion to the bases. No CVAT. CARDIOVASC: RRR without murmur/rub. GASTROINT: soft, non-tender, without masses or organomegaly   MUSCULOSKEL: no joint tenderness, deformity or swelling   EXTREMITIES: extremities normal, atraumatic, no cyanosis or edema   PELVIC: Deferred   RECTAL: Deferred   JOHN SURVEY: No suspicious lymphadenopathy or edema noted. NEURO: Grossly intact. No acute deficit. Lab Results   Component Value Date/Time    WBC 5.9 12/07/2018 10:31 AM    HGB 11.1 (L) 12/07/2018 10:31 AM    HCT 36.1 12/07/2018 10:31 AM    PLATELET 192 09/33/3214 10:31 AM    MCV 98.9 12/07/2018 10:31 AM     Lab Results   Component Value Date/Time    Sodium 139 12/07/2018 10:31 AM    Potassium 4.3 12/07/2018 10:31 AM    Chloride 105 12/07/2018 10:31 AM    CO2 26 12/07/2018 10:31 AM    Anion gap 8 12/07/2018 10:31 AM    Glucose 93 12/07/2018 10:31 AM    BUN 18 12/07/2018 10:31 AM    Creatinine 1.17 (H) 12/07/2018 10:31 AM    BUN/Creatinine ratio 15 12/07/2018 10:31 AM    GFR est AA 57 (L) 12/07/2018 10:31 AM    GFR est non-AA 47 (L) 12/07/2018 10:31 AM    Calcium 9.3 12/07/2018 10:31 AM     Lab Results   Component Value Date/Time    CA-125 12 03/29/2019 11:21 AM    Cancer Ag (CA) 125 27.0 05/21/2018 11:04 AM       PET/CT (7/23/18)  HEAD/NECK: No apparent foci of abnormal hypermetabolism. Cerebral evaluation is  limited by normal intense activity. Old left MCA infarct is noted.     CHEST: No foci of abnormal hypermetabolism.     ABDOMEN/PELVIS: No foci of abnormal hypermetabolism. Left adrenal nodules are  stable and do not demonstrate increased tracer activity.  The uterus is  surgically absent.     SKELETON: No foci of abnormal hypermetabolism in the axial and visualized  appendicular skeleton.     IMPRESSION: No Foci of Abnormal Hypermetabolism. IMPRESSION/PLAN:  Luz Acuna is a 59 y.o. female with a diagnosis of stage IA uterine carcinosarcoma. Following surgical resection she received 6 cycles of Taxol/Carboplatin chemotherapy, followed by pelvic radiation therapy. She has no evidence of disease on today's exam.  I will see her back in 3 months for continued surveillance.         Signed By: Dexter Fernando MD     4/30/2019/12:10 PM

## 2019-04-30 NOTE — PROGRESS NOTES
1. Have you been to the ER, urgent care clinic since your last visit? Hospitalized since your last visit? No    2. Have you seen or consulted any other health care providers outside of the [address] [place] [address] since your last visit? Include any pap smears or colon screening.  No

## 2019-05-08 ENCOUNTER — HOSPITAL ENCOUNTER (OUTPATIENT)
Dept: MAMMOGRAPHY | Age: 64
Discharge: HOME | End: 2019-05-08
Attending: INTERNAL MEDICINE
Payer: MEDICARE

## 2019-05-08 DIAGNOSIS — R92.8 ABNORMAL MAMMOGRAM OF LEFT BREAST: ICD-10-CM

## 2019-05-08 PROCEDURE — 77065 DX MAMMO INCL CAD UNI: CPT

## 2019-05-24 ENCOUNTER — HOSPITAL ENCOUNTER (OUTPATIENT)
Dept: INFUSION THERAPY | Age: 64
Discharge: HOME | End: 2019-05-24
Payer: MEDICARE

## 2019-05-24 ENCOUNTER — TELEPHONE (OUTPATIENT)
Dept: GYNECOLOGY | Age: 64
End: 2019-05-24

## 2019-05-24 VITALS
DIASTOLIC BLOOD PRESSURE: 86 MMHG | SYSTOLIC BLOOD PRESSURE: 125 MMHG | HEART RATE: 82 BPM | RESPIRATION RATE: 18 BRPM | TEMPERATURE: 98.3 F

## 2019-05-24 LAB — CANCER AG125 SERPL-ACNC: 10 U/ML (ref 1.5–35)

## 2019-05-24 PROCEDURE — 36415 COLL VENOUS BLD VENIPUNCTURE: CPT

## 2019-05-24 PROCEDURE — 86304 IMMUNOASSAY TUMOR CA 125: CPT

## 2019-05-24 PROCEDURE — 36591 DRAW BLOOD OFF VENOUS DEVICE: CPT

## 2019-05-24 PROCEDURE — 77030012965 HC NDL HUBR BBMI -A

## 2019-05-24 NOTE — PROGRESS NOTES
Outpatient Infusion Center Short Visit Progress Note    2169 Pt admit to [facility] for port flush/labs ambulatory in stable condition. Assessment completed. No new concerns voiced. Please review pending lab results in [address] [place] [address]. Patient Vitals for the past 12 hrs:   Temp Pulse Resp BP   05/24/19 1049 98.3 °F (36.8 °C) 82 18 125/86       Port accessed with positive blood return. Lab drawn, flushed, heparinized and de-accessed per protocol. Medications:  NS flush  Heparin flush    1105 Pt tolerated treatment well. D/c home ambulatory in no distress. Pt aware of next appointment scheduled for 07/19/2019.

## 2019-05-30 RX ORDER — SODIUM CHLORIDE 0.9 % (FLUSH) 0.9 %
5-10 SYRINGE (ML) INJECTION AS NEEDED
Status: CANCELLED | OUTPATIENT
Start: 2019-09-13

## 2019-05-30 RX ORDER — SODIUM CHLORIDE 9 MG/ML
10 INJECTION INTRAMUSCULAR; INTRAVENOUS; SUBCUTANEOUS AS NEEDED
Status: CANCELLED | OUTPATIENT
Start: 2019-07-19

## 2019-05-30 RX ORDER — SODIUM CHLORIDE 9 MG/ML
10 INJECTION INTRAMUSCULAR; INTRAVENOUS; SUBCUTANEOUS AS NEEDED
Status: CANCELLED | OUTPATIENT
Start: 2019-09-13

## 2019-05-30 RX ORDER — SODIUM CHLORIDE 0.9 % (FLUSH) 0.9 %
5-10 SYRINGE (ML) INJECTION AS NEEDED
Status: CANCELLED | OUTPATIENT
Start: 2019-07-19

## 2019-05-30 RX ORDER — HEPARIN 100 UNIT/ML
500 SYRINGE INTRAVENOUS AS NEEDED
Status: CANCELLED | OUTPATIENT
Start: 2019-09-13

## 2019-05-30 RX ORDER — HEPARIN 100 UNIT/ML
500 SYRINGE INTRAVENOUS AS NEEDED
Status: CANCELLED | OUTPATIENT
Start: 2019-07-19

## 2019-07-01 NOTE — TELEPHONE ENCOUNTER
Message left on answering machine of next port flush appt at [facility] for 7/19/19 at 11am, and Dr. Nneka Medeiros on 7/30/19 at 11:30am.

## 2019-07-19 ENCOUNTER — HOSPITAL ENCOUNTER (OUTPATIENT)
Dept: INFUSION THERAPY | Age: 64
Discharge: HOME | End: 2019-07-19
Payer: MEDICARE

## 2019-07-19 VITALS
SYSTOLIC BLOOD PRESSURE: 135 MMHG | TEMPERATURE: 98.3 F | HEART RATE: 81 BPM | DIASTOLIC BLOOD PRESSURE: 82 MMHG | RESPIRATION RATE: 18 BRPM

## 2019-07-19 DIAGNOSIS — C55 UTERINE CARCINOSARCOMA (HCC): Primary | ICD-10-CM

## 2019-07-19 LAB — CANCER AG125 SERPL-ACNC: 10 U/ML (ref 1.5–35)

## 2019-07-19 PROCEDURE — 74011250636 HC RX REV CODE- 250/636: Performed by: NURSE PRACTITIONER

## 2019-07-19 PROCEDURE — 74011000250 HC RX REV CODE- 250: Performed by: NURSE PRACTITIONER

## 2019-07-19 PROCEDURE — 86304 IMMUNOASSAY TUMOR CA 125: CPT

## 2019-07-19 PROCEDURE — 36591 DRAW BLOOD OFF VENOUS DEVICE: CPT

## 2019-07-19 PROCEDURE — 77030012965 HC NDL HUBR BBMI -A

## 2019-07-19 PROCEDURE — 36415 COLL VENOUS BLD VENIPUNCTURE: CPT

## 2019-07-19 RX ORDER — SODIUM CHLORIDE 9 MG/ML
10 INJECTION INTRAMUSCULAR; INTRAVENOUS; SUBCUTANEOUS AS NEEDED
Status: ACTIVE | OUTPATIENT
Start: 2019-07-19 | End: 2019-07-19

## 2019-07-19 RX ORDER — HEPARIN 100 UNIT/ML
500 SYRINGE INTRAVENOUS AS NEEDED
Status: ACTIVE | OUTPATIENT
Start: 2019-07-19 | End: 2019-07-19

## 2019-07-19 RX ORDER — SODIUM CHLORIDE 0.9 % (FLUSH) 0.9 %
5-10 SYRINGE (ML) INJECTION AS NEEDED
Status: ACTIVE | OUTPATIENT
Start: 2019-07-19 | End: 2019-07-19

## 2019-07-19 RX ADMIN — SODIUM CHLORIDE 10 ML: 9 INJECTION INTRAMUSCULAR; INTRAVENOUS; SUBCUTANEOUS at 11:17

## 2019-07-19 RX ADMIN — Medication 10 ML: at 11:18

## 2019-07-19 RX ADMIN — SODIUM CHLORIDE, PRESERVATIVE FREE 500 UNITS: 5 INJECTION INTRAVENOUS at 11:17

## 2019-07-19 NOTE — PROGRESS NOTES
Outpatient Infusion Center Short Visit Progress Note    1100 Pt admit to [facility] for port flush/labs ambulatory with cane in stable condition. Assessment completed. No new concerns voiced. Please review pending lab results in [address] [place] [address]. Patient Vitals for the past 12 hrs:   Temp Pulse Resp BP   07/19/19 1101 98.3 °F (36.8 °C) 81 18 135/82       Port accessed with positive blood return. Lab drawn, flushed, heparinized and de-accessed per protocol. Medications:  NS flush  Heparin flush    1120 Pt tolerated treatment well. D/c home ambulatory with cane in no distress. Pt aware of next appointment scheduled for 09/13/2019.

## 2019-07-29 NOTE — PROGRESS NOTES
Three month check up for history of endometrial cancer. Pt states no abnormal spotting, bleeding or pain. 1. Have you been to the ER, urgent care clinic since your last visit? Hospitalized since your last visit? No     2. Have you seen or consulted any other health care providers outside of the [address] [place] [address] since your last visit? Include any pap smears or colon screening.  No

## 2019-07-29 NOTE — PROGRESS NOTES
[address] [place] [address] Mathias Moritz 9, [address] [place] [address]  P [phone]  F [phone]    Office Note  Patient ID:  Name:  Enrique Zamarripa  MRN:  [id number]  :  1955/64 y.o. Date:  2019      HISTORY OF PRESENT ILLNESS:  June Clarita Bamberger is a 59 y.o.  postmenopausal female with a diagnosis of stage IA uterine carcinosarcoma. She underwent robotic hysterectomy with staging in late 2018. Pathology revealed LVSI but her sentinel nodes were negative. Pelvic washings were also negative. She was recommended adjuvant chemotherapy with Taxol/Carboplatin due to her risk of recurrence. She completed her 6 prescribed cycles of chemotherapy. She subsequently received pelvic radiation therapy with brachytherapy. She presents today for follow up. She is doing well. She has some urinary symptoms, but no GI complaints. She has no pain. Denies vaginal bleeding or discharge. ROS:   and GI review:  Negative  Cardiopulmonary review:  Negative   Musculoskeletal:  Negative    A comprehensive review of systems was negative except for that written in the History of Present Illness. , 10 point ROS        Problem List:  Patient Active Problem List    Diagnosis Date Noted    Anemia 2018    Severe obesity (BMI 35.0-39. 9) with comorbidity ([address] 2018    Elevated serum creatinine 2018    Uterine carcinosarcoma ([address] 2018    Obesity (BMI 30.0-34.9) 2018    Hypertension 2017    Chronic lung disease 2017    Stroke ([address] 2017     PMH:  Past Medical History:   Diagnosis Date    Cancer ([address]     UTERINE CA    Chronic pain     Hypercholesterolemia     Hypertension     Stroke ([address] 2007    R SIDE AFFECTED      PSH:  Past Surgical History:   Procedure Laterality Date    COLONOSCOPY,DIAGNOSTIC  2015         HX BREAST BIOPSY Right 1993    right breast:  benign    HX GI      COLONOSCOPY    HX ORTHOPAEDIC GREAT L TOE BONE SPUR REMOVED      Social History:  Social History     Tobacco Use    Smoking status: Former Smoker     Last attempt to quit: 10/4/1991     Years since quittin.8    Smokeless tobacco: Never Used   Substance Use Topics    Alcohol use: No      Family History:  Family History   Problem Relation Age of Onset    Heart Disease Mother     Hypertension Mother     Diabetes Mother     Cancer Mother 62        breast cancer    Breast Cancer Mother         52's    Cancer Father 61        lung cancer    Stroke Brother     Stroke Maternal Grandmother     Stroke Maternal Grandfather     Kidney Disease Brother     Hypertension Brother       Medications: (reviewed)  Current Outpatient Medications   Medication Sig    lidocaine-prilocaine (EMLA) topical cream Apply small amount over port area one hour before chemo treatment and cover with a Band-Aid    aspirin (ASPIRIN) 325 mg tablet Take 325 mg by mouth daily.  clopidogrel (PLAVIX) 75 mg tablet Take 75 mg by mouth daily.  nystatin-triamcinolone (MYCOLOG II) topical cream Apply  to affected area two (2) times a day.  ferrous sulfate 325 mg (65 mg iron) tablet TAKE 1 TABLET A DAY    SENNA-S 8.6-50 mg per tablet TK 1 T PO daily    amLODIPine (NORVASC) 10 mg tablet Take 10 mg by mouth daily.  meclizine (ANTIVERT) 25 mg tablet Take 25 mg by mouth as needed.  traMADol (ULTRAM) 50 mg tablet Take 50 mg by mouth nightly.  atorvastatin (LIPITOR) 10 mg tablet Take 10 mg by mouth daily.  metoprolol (LOPRESSOR) 50 mg tablet Take 50 mg by mouth two (2) times a day.  oxybutynin (DITROPAN) 5 mg tablet Take 15 mg by mouth two (2) times a day. No current facility-administered medications for this visit. Allergies: (reviewed)  Allergies   Allergen Reactions    Latex Itching    Pcn [Penicillins] Unknown (comments)     Pt's states she doesn't know reaction.           OBJECTIVE:    Physical Exam:  VITAL SIGNS: Vitals:    19 1116 BP: 112/77   Pulse: (!) 57   Weight: 202 lb (91.6 kg)   Height: 5' 7.01\" (1.702 m)     Body mass index is 31.63 kg/m². GENERAL MELVIN: Conversant, alert, oriented. No acute distress. HEENT: HEENT. No thyroid enlargement. No JVD. Neck: Supple without restrictions. RESPIRATORY: Clear to auscultation and percussion to the bases. No CVAT. CARDIOVASC: RRR without murmur/rub. GASTROINT: soft, non-tender, without masses or organomegaly   MUSCULOSKEL: no joint tenderness, deformity or swelling   EXTREMITIES: extremities normal, atraumatic, no cyanosis or edema   PELVIC: Normal external genitalia. Normal vagina. Uterus, cervix, and adnexa absent. No masses or nodularity. RECTAL: Deferred   JOHN SURVEY: No suspicious lymphadenopathy or edema noted. NEURO: Grossly intact. No acute deficit. Lab Results   Component Value Date/Time    WBC 5.9 12/07/2018 10:31 AM    HGB 11.1 (L) 12/07/2018 10:31 AM    HCT 36.1 12/07/2018 10:31 AM    PLATELET 799 84/02/2538 10:31 AM    MCV 98.9 12/07/2018 10:31 AM     Lab Results   Component Value Date/Time    Sodium 139 12/07/2018 10:31 AM    Potassium 4.3 12/07/2018 10:31 AM    Chloride 105 12/07/2018 10:31 AM    CO2 26 12/07/2018 10:31 AM    Anion gap 8 12/07/2018 10:31 AM    Glucose 93 12/07/2018 10:31 AM    BUN 18 12/07/2018 10:31 AM    Creatinine 1.17 (H) 12/07/2018 10:31 AM    BUN/Creatinine ratio 15 12/07/2018 10:31 AM    GFR est AA 57 (L) 12/07/2018 10:31 AM    GFR est non-AA 47 (L) 12/07/2018 10:31 AM    Calcium 9.3 12/07/2018 10:31 AM     Lab Results   Component Value Date/Time    CA-125 10 07/19/2019 11:05 AM    Cancer Ag (CA) 125 27.0 05/21/2018 11:04 AM       PET/CT (7/23/18)  HEAD/NECK: No apparent foci of abnormal hypermetabolism. Cerebral evaluation is  limited by normal intense activity. Old left MCA infarct is noted.     CHEST: No foci of abnormal hypermetabolism.     ABDOMEN/PELVIS: No foci of abnormal hypermetabolism.  Left adrenal nodules are  stable and do not demonstrate increased tracer activity. The uterus is  surgically absent.     SKELETON: No foci of abnormal hypermetabolism in the axial and visualized  appendicular skeleton.     IMPRESSION: No Foci of Abnormal Hypermetabolism. IMPRESSION/PLAN:  Luz Woody is a 59 y.o. female with a diagnosis of stage IA uterine carcinosarcoma. Following surgical resection she received 6 cycles of Taxol/Carboplatin chemotherapy, followed by pelvic radiation therapy. She has no evidence of disease on today's exam.  I will see her back in 3 months for continued surveillance.         Signed By: Stacy López MD     7/30/2019/12:10 PM

## 2019-07-30 ENCOUNTER — OFFICE VISIT (OUTPATIENT)
Dept: GYNECOLOGY | Age: 64
End: 2019-07-30

## 2019-07-30 VITALS
HEART RATE: 57 BPM | DIASTOLIC BLOOD PRESSURE: 77 MMHG | WEIGHT: 202 LBS | SYSTOLIC BLOOD PRESSURE: 112 MMHG | HEIGHT: 67 IN | BODY MASS INDEX: 31.71 KG/M2

## 2019-07-30 DIAGNOSIS — C55 UTERINE CARCINOSARCOMA (HCC): Primary | ICD-10-CM

## 2019-09-13 ENCOUNTER — HOSPITAL ENCOUNTER (OUTPATIENT)
Dept: INFUSION THERAPY | Age: 64
Discharge: HOME | End: 2019-09-13
Payer: MEDICARE

## 2019-09-13 VITALS
DIASTOLIC BLOOD PRESSURE: 85 MMHG | TEMPERATURE: 98.8 F | HEART RATE: 73 BPM | SYSTOLIC BLOOD PRESSURE: 136 MMHG | RESPIRATION RATE: 18 BRPM

## 2019-09-13 DIAGNOSIS — C55 UTERINE CARCINOSARCOMA (HCC): Primary | ICD-10-CM

## 2019-09-13 LAB — CANCER AG125 SERPL-ACNC: 12 U/ML (ref 1.5–35)

## 2019-09-13 PROCEDURE — 86304 IMMUNOASSAY TUMOR CA 125: CPT

## 2019-09-13 PROCEDURE — 36415 COLL VENOUS BLD VENIPUNCTURE: CPT

## 2019-09-13 PROCEDURE — 74011250636 HC RX REV CODE- 250/636: Performed by: NURSE PRACTITIONER

## 2019-09-13 PROCEDURE — 36591 DRAW BLOOD OFF VENOUS DEVICE: CPT

## 2019-09-13 PROCEDURE — 77030012965 HC NDL HUBR BBMI -A

## 2019-09-13 RX ORDER — HEPARIN 100 UNIT/ML
500 SYRINGE INTRAVENOUS AS NEEDED
Status: ACTIVE | OUTPATIENT
Start: 2019-09-13 | End: 2019-09-13

## 2019-09-13 RX ORDER — SODIUM CHLORIDE 0.9 % (FLUSH) 0.9 %
5-10 SYRINGE (ML) INJECTION AS NEEDED
Status: ACTIVE | OUTPATIENT
Start: 2019-09-13 | End: 2019-09-13

## 2019-09-13 RX ORDER — SODIUM CHLORIDE 9 MG/ML
10 INJECTION INTRAMUSCULAR; INTRAVENOUS; SUBCUTANEOUS AS NEEDED
Status: ACTIVE | OUTPATIENT
Start: 2019-09-13 | End: 2019-09-13

## 2019-09-13 RX ADMIN — Medication 10 ML: at 11:20

## 2019-09-13 RX ADMIN — SODIUM CHLORIDE 10 ML: 9 INJECTION INTRAMUSCULAR; INTRAVENOUS; SUBCUTANEOUS at 11:17

## 2019-09-13 RX ADMIN — SODIUM CHLORIDE, PRESERVATIVE FREE 500 UNITS: 5 INJECTION INTRAVENOUS at 11:20

## 2019-09-13 RX ADMIN — Medication 10 ML: at 11:18

## 2019-09-13 RX ADMIN — Medication 10 ML: at 11:19

## 2019-09-13 NOTE — PROGRESS NOTES
Outpatient Infusion Center Short Visit Progress Note    7706 Pt admit to [address] [place] [address] for port flush/labs ambulatory with cane in stable condition. Assessment completed. No new concerns voiced. Please review pending lab results in [address] [place] [address]. Patient Vitals for the past 12 hrs:   Temp Pulse Resp BP   09/13/19 1113 98.8 °F (37.1 °C) 73 18 136/85       Port accessed with positive blood return. Lab drawn, flushed, heparinized and de-accessed per protocol. Medications:  NS flush  Heparin flush    1120 Pt tolerated treatment well. D/c home ambulatory with cane in no distress. Pt aware of next appointment scheduled for 11/8/19.

## 2019-09-25 NOTE — ED NOTES
Dr. Hansa Kennedy reviewed discharge instructions with the patient. The patient verbalized understanding. Shingrix vaccination 4/13/18  Trudi Recio RN

## 2019-10-28 NOTE — PROGRESS NOTES
Three month check up for history of endometrial cancer. Pt states no abnormal spotting, bleeding or pain.

## 2019-10-29 ENCOUNTER — OFFICE VISIT (OUTPATIENT)
Dept: GYNECOLOGY | Age: 64
End: 2019-10-29

## 2019-10-29 VITALS
SYSTOLIC BLOOD PRESSURE: 140 MMHG | BODY MASS INDEX: 33.12 KG/M2 | HEART RATE: 60 BPM | WEIGHT: 211 LBS | HEIGHT: 67 IN | DIASTOLIC BLOOD PRESSURE: 90 MMHG

## 2019-10-29 DIAGNOSIS — C55 UTERINE CARCINOSARCOMA (HCC): Primary | ICD-10-CM

## 2019-10-29 RX ORDER — SODIUM CHLORIDE 9 MG/ML
10 INJECTION INTRAMUSCULAR; INTRAVENOUS; SUBCUTANEOUS AS NEEDED
Status: CANCELLED | OUTPATIENT
Start: 2019-11-08

## 2019-10-29 RX ORDER — SODIUM CHLORIDE 0.9 % (FLUSH) 0.9 %
5-10 SYRINGE (ML) INJECTION AS NEEDED
Status: CANCELLED | OUTPATIENT
Start: 2019-11-08

## 2019-10-29 RX ORDER — HEPARIN 100 UNIT/ML
500 SYRINGE INTRAVENOUS AS NEEDED
Status: CANCELLED | OUTPATIENT
Start: 2019-11-08

## 2019-10-29 NOTE — PROGRESS NOTES
[address] [place] [address] Mathias Moritz 2, [address]  P [phone]  F [phone]    Office Note  Patient ID:  Name:  Aftab Ortiz  MRN:  [id number]  :  1955/64 y.o. Date:  10/29/2019      HISTORY OF PRESENT ILLNESS:  Luz Correia is a 59 y.o.  postmenopausal female with a diagnosis of stage IA uterine carcinosarcoma. She underwent robotic hysterectomy with staging in late 2018. Pathology revealed LVSI but her sentinel nodes were negative. Pelvic washings were also negative. She was recommended adjuvant chemotherapy with Taxol/Carboplatin due to her risk of recurrence. She completed her 6 prescribed cycles of chemotherapy. She subsequently received pelvic radiation therapy with brachytherapy. She presents today for follow up. She is doing well. She has some urinary symptoms, but no GI complaints. She has no pain. Denies vaginal bleeding or discharge. ROS:   and GI review:  Negative  Cardiopulmonary review:  Negative   Musculoskeletal:  Negative    A comprehensive review of systems was negative except for that written in the History of Present Illness. , 10 point ROS        Problem List:  Patient Active Problem List    Diagnosis Date Noted    Anemia 2018    Severe obesity (BMI 35.0-39. 9) with comorbidity ([address] 2018    Elevated serum creatinine 2018    Uterine carcinosarcoma ([address] 2018    Obesity (BMI 30.0-34.9) 2018    Hypertension 2017    Chronic lung disease 2017    Stroke ([address] 2017     PMH:  Past Medical History:   Diagnosis Date    Cancer ([address]     UTERINE CA    Chronic pain     Hypercholesterolemia     Hypertension     Stroke ([address] 2007    R SIDE AFFECTED      PSH:  Past Surgical History:   Procedure Laterality Date    COLONOSCOPY,DIAGNOSTIC  2015         HX BREAST BIOPSY Right 1993    right breast:  benign    HX GI      COLONOSCOPY    HX ORTHOPAEDIC GREAT L TOE BONE SPUR REMOVED      Social History:  Social History     Tobacco Use    Smoking status: Former Smoker     Last attempt to quit: 10/4/1991     Years since quittin.0    Smokeless tobacco: Never Used   Substance Use Topics    Alcohol use: No      Family History:  Family History   Problem Relation Age of Onset    Heart Disease Mother     Hypertension Mother     Diabetes Mother     Cancer Mother 62        breast cancer    Breast Cancer Mother         52's    Cancer Father 61        lung cancer    Stroke Brother     Stroke Maternal Grandmother     Stroke Maternal Grandfather     Kidney Disease Brother     Hypertension Brother       Medications: (reviewed)  Current Outpatient Medications   Medication Sig    nystatin-triamcinolone (MYCOLOG II) topical cream Apply  to affected area two (2) times a day.  ferrous sulfate 325 mg (65 mg iron) tablet TAKE 1 TABLET A DAY    SENNA-S 8.6-50 mg per tablet TK 1 T PO daily    lidocaine-prilocaine (EMLA) topical cream Apply small amount over port area one hour before chemo treatment and cover with a Band-Aid    amLODIPine (NORVASC) 10 mg tablet Take 10 mg by mouth daily.  meclizine (ANTIVERT) 25 mg tablet Take 25 mg by mouth as needed.  traMADol (ULTRAM) 50 mg tablet Take 50 mg by mouth nightly.  aspirin (ASPIRIN) 325 mg tablet Take 325 mg by mouth daily.  atorvastatin (LIPITOR) 10 mg tablet Take 10 mg by mouth daily.  clopidogrel (PLAVIX) 75 mg tablet Take 75 mg by mouth daily.  metoprolol (LOPRESSOR) 50 mg tablet Take 50 mg by mouth two (2) times a day.  oxybutynin (DITROPAN) 5 mg tablet Take 15 mg by mouth two (2) times a day. No current facility-administered medications for this visit. Allergies: (reviewed)  Allergies   Allergen Reactions    Latex Itching    Pcn [Penicillins] Unknown (comments)     Pt's states she doesn't know reaction.           OBJECTIVE:    Physical Exam:  VITAL SIGNS: Vitals:    10/29/19 1049 BP: 140/90   Pulse: 60   Weight: 211 lb (95.7 kg)   Height: 5' 7.01\" (1.702 m)     Body mass index is 33.04 kg/m². GENERAL MELVIN: Conversant, alert, oriented. No acute distress. HEENT: HEENT. No thyroid enlargement. No JVD. Neck: Supple without restrictions. RESPIRATORY: Clear to auscultation and percussion to the bases. No CVAT. CARDIOVASC: RRR without murmur/rub. GASTROINT: soft, non-tender, without masses or organomegaly   MUSCULOSKEL: no joint tenderness, deformity or swelling   EXTREMITIES: extremities normal, atraumatic, no cyanosis or edema   PELVIC: Normal external genitalia. Normal vagina. Uterus, cervix, and adnexa absent. No masses or nodularity. RECTAL: Deferred   JOHN SURVEY: No suspicious lymphadenopathy or edema noted. NEURO: Grossly intact. No acute deficit. Lab Results   Component Value Date/Time    WBC 5.9 12/07/2018 10:31 AM    HGB 11.1 (L) 12/07/2018 10:31 AM    HCT 36.1 12/07/2018 10:31 AM    PLATELET 697 81/90/8343 10:31 AM    MCV 98.9 12/07/2018 10:31 AM     Lab Results   Component Value Date/Time    Sodium 139 12/07/2018 10:31 AM    Potassium 4.3 12/07/2018 10:31 AM    Chloride 105 12/07/2018 10:31 AM    CO2 26 12/07/2018 10:31 AM    Anion gap 8 12/07/2018 10:31 AM    Glucose 93 12/07/2018 10:31 AM    BUN 18 12/07/2018 10:31 AM    Creatinine 1.17 (H) 12/07/2018 10:31 AM    BUN/Creatinine ratio 15 12/07/2018 10:31 AM    GFR est AA 57 (L) 12/07/2018 10:31 AM    GFR est non-AA 47 (L) 12/07/2018 10:31 AM    Calcium 9.3 12/07/2018 10:31 AM     Lab Results   Component Value Date/Time    CA-125 12 09/13/2019 11:30 AM    Cancer Ag (CA) 125 27.0 05/21/2018 11:04 AM       PET/CT (7/23/18)  HEAD/NECK: No apparent foci of abnormal hypermetabolism. Cerebral evaluation is  limited by normal intense activity. Old left MCA infarct is noted.     CHEST: No foci of abnormal hypermetabolism.     ABDOMEN/PELVIS: No foci of abnormal hypermetabolism.  Left adrenal nodules are  stable and do not demonstrate increased tracer activity. The uterus is  surgically absent.     SKELETON: No foci of abnormal hypermetabolism in the axial and visualized  appendicular skeleton.     IMPRESSION: No Foci of Abnormal Hypermetabolism. IMPRESSION/PLAN:  Luz Ayala is a 59 y.o. female with a diagnosis of stage IA uterine carcinosarcoma. Following surgical resection she received 6 cycles of Taxol/Carboplatin chemotherapy, followed by pelvic radiation therapy. She has no evidence of disease on today's exam.  I will see her back in 3 months for continued surveillance.         Signed By: Fernanda Tejada MD     10/29/2019/12:10 PM

## 2019-10-29 NOTE — LETTER
10/29/19 Patient: Chari Schirmer YOB: 1955 Date of Visit: 10/29/2019 Damir Childers MD 
[address] [facility] [address] [id number] VIA Facsimile: [phone] Dear Damir Childers MD, Thank you for referring Ms. Chari Schirmer to [facility] for evaluation. My notes for this consultation are attached. If you have questions, please do not hesitate to call me. I look forward to following your patient along with you.  
 
 
Sincerely, 
 
Tucker Ortiz MD

## 2019-11-08 ENCOUNTER — HOSPITAL ENCOUNTER (OUTPATIENT)
Dept: INFUSION THERAPY | Age: 64
Discharge: HOME | End: 2019-11-08
Payer: MEDICARE

## 2019-11-08 VITALS
SYSTOLIC BLOOD PRESSURE: 123 MMHG | HEART RATE: 73 BPM | TEMPERATURE: 98.8 F | DIASTOLIC BLOOD PRESSURE: 74 MMHG | OXYGEN SATURATION: 98 % | RESPIRATION RATE: 18 BRPM

## 2019-11-08 DIAGNOSIS — C55 UTERINE CARCINOSARCOMA (HCC): Primary | ICD-10-CM

## 2019-11-08 LAB — CANCER AG125 SERPL-ACNC: 11 U/ML (ref 1.5–35)

## 2019-11-08 PROCEDURE — 74011250636 HC RX REV CODE- 250/636: Performed by: OBSTETRICS & GYNECOLOGY

## 2019-11-08 PROCEDURE — 36415 COLL VENOUS BLD VENIPUNCTURE: CPT

## 2019-11-08 PROCEDURE — 36591 DRAW BLOOD OFF VENOUS DEVICE: CPT

## 2019-11-08 PROCEDURE — 86304 IMMUNOASSAY TUMOR CA 125: CPT

## 2019-11-08 PROCEDURE — 77030012965 HC NDL HUBR BBMI -A

## 2019-11-08 RX ORDER — SODIUM CHLORIDE 9 MG/ML
10 INJECTION INTRAMUSCULAR; INTRAVENOUS; SUBCUTANEOUS AS NEEDED
Status: ACTIVE | OUTPATIENT
Start: 2019-11-08 | End: 2019-11-08

## 2019-11-08 RX ORDER — SODIUM CHLORIDE 0.9 % (FLUSH) 0.9 %
5-10 SYRINGE (ML) INJECTION AS NEEDED
Status: ACTIVE | OUTPATIENT
Start: 2019-11-08 | End: 2019-11-08

## 2019-11-08 RX ORDER — HEPARIN 100 UNIT/ML
500 SYRINGE INTRAVENOUS AS NEEDED
Status: ACTIVE | OUTPATIENT
Start: 2019-11-08 | End: 2019-11-08

## 2019-11-08 RX ADMIN — SODIUM CHLORIDE 10 ML: 9 INJECTION INTRAMUSCULAR; INTRAVENOUS; SUBCUTANEOUS at 11:13

## 2019-11-08 RX ADMIN — Medication 10 ML: at 11:13

## 2019-11-08 RX ADMIN — HEPARIN 500 UNITS: 100 SYRINGE at 11:13

## 2019-11-08 NOTE — PROGRESS NOTES
Outpatient Infusion Center Short Visit Progress Note    1185 Patient admitted to [facility] for Port flush and labs ambulatory via cane in stable condition. Assessment completed. No new concerns voiced. Port accessed with positive blood return. Labs drawn and sent for processing. Results pending in CC. Vital Signs:  Visit Vitals  /74 (BP 1 Location: Left arm, BP Patient Position: Sitting)   Pulse 73   Temp 98.8 °F (37.1 °C)   Resp 18   LMP 01/09/2011   SpO2 98%   Breastfeeding? No     Medications:  Medications Administered     heparin (porcine) pf 500 Units     Admin Date  11/08/2019 Action  Given Dose  500 Units Route  IntraVENous Administered By  Piotr Lomas RN          sodium chloride (NS) flush 5-10 mL     Admin Date  11/08/2019 Action  Given Dose  10 mL Route  IntraVENous Administered By  Piotr Lomas RN          sodium chloride 0.9% injection 10 mL     Admin Date  11/08/2019 Action  Given Dose  10 mL Route  IntraVENous Administered By  Piotr Lomas RN              7069 Patient tolerated treatment well. Port flushed with positive blood return, heparinized, and de-accessed per protocol. Patient discharged from [place] [address] ambulatory in no distress at 1115. Patient aware of next appointment.     Future Appointments   Date Time Provider Bj Gilliam   1/3/2020 11:00 AM [facility]   1/28/2020 11:30 AM Kavya Coronado MD [address] [place] [address]   2/28/2020 11:00 AM CARL FT CHAIR 1 [facility]   4/28/2020 11:30 AM Kavya Coronado MD [address] [place] [address]   7/28/2020 11:30 AM Kavya Coronado MD [address] [place] [address]

## 2019-12-27 ENCOUNTER — TELEPHONE (OUTPATIENT)
Dept: GYNECOLOGY | Age: 64
End: 2019-12-27

## 2019-12-27 RX ORDER — SODIUM CHLORIDE 9 MG/ML
10 INJECTION INTRAMUSCULAR; INTRAVENOUS; SUBCUTANEOUS AS NEEDED
Status: CANCELLED | OUTPATIENT
Start: 2020-01-10

## 2019-12-27 RX ORDER — HEPARIN 100 UNIT/ML
500 SYRINGE INTRAVENOUS AS NEEDED
Status: CANCELLED | OUTPATIENT
Start: 2020-01-10

## 2019-12-27 RX ORDER — SODIUM CHLORIDE 0.9 % (FLUSH) 0.9 %
5-10 SYRINGE (ML) INJECTION AS NEEDED
Status: CANCELLED | OUTPATIENT
Start: 2020-01-10

## 2020-01-03 ENCOUNTER — HOSPITAL ENCOUNTER (OUTPATIENT)
Dept: INFUSION THERAPY | Age: 65
End: 2020-01-03

## 2020-01-10 ENCOUNTER — HOSPITAL ENCOUNTER (OUTPATIENT)
Dept: INFUSION THERAPY | Age: 65
Discharge: HOME | End: 2020-01-10
Payer: MEDICARE

## 2020-01-10 VITALS
SYSTOLIC BLOOD PRESSURE: 133 MMHG | HEART RATE: 61 BPM | DIASTOLIC BLOOD PRESSURE: 76 MMHG | TEMPERATURE: 96.9 F | RESPIRATION RATE: 18 BRPM

## 2020-01-10 DIAGNOSIS — C55 UTERINE CARCINOSARCOMA (HCC): Primary | ICD-10-CM

## 2020-01-10 LAB — CANCER AG125 SERPL-ACNC: 12 U/ML (ref 1.5–35)

## 2020-01-10 PROCEDURE — 36415 COLL VENOUS BLD VENIPUNCTURE: CPT

## 2020-01-10 PROCEDURE — 77030012965 HC NDL HUBR BBMI -A

## 2020-01-10 PROCEDURE — 86304 IMMUNOASSAY TUMOR CA 125: CPT

## 2020-01-10 RX ORDER — SODIUM CHLORIDE 0.9 % (FLUSH) 0.9 %
5-10 SYRINGE (ML) INJECTION AS NEEDED
Status: DISCONTINUED | OUTPATIENT
Start: 2020-01-10 | End: 2020-01-11 | Stop reason: HOSPADM

## 2020-01-10 RX ORDER — HEPARIN 100 UNIT/ML
500 SYRINGE INTRAVENOUS AS NEEDED
Status: DISCONTINUED | OUTPATIENT
Start: 2020-01-10 | End: 2020-01-11 | Stop reason: HOSPADM

## 2020-01-10 RX ORDER — SODIUM CHLORIDE 9 MG/ML
10 INJECTION INTRAMUSCULAR; INTRAVENOUS; SUBCUTANEOUS AS NEEDED
Status: DISCONTINUED | OUTPATIENT
Start: 2020-01-10 | End: 2020-01-11 | Stop reason: HOSPADM

## 2020-01-10 NOTE — PROGRESS NOTES
[facility] SHORT VISIT PROGRESS NOTE    1125 Pt arrived to [address] [place] [address] in no distress for port flush and labs. Pt states she felt her port \"move a couple of days ago\" when she laid down. Assessment completed, no other new concerns voiced. Visit Vitals  /76 (BP 1 Location: Left arm, BP Patient Position: Sitting)   Pulse 61   Temp 96.9 °F (36.1 °C)   Resp 18   Breastfeeding No       Unable to access port at this time. Mariya Hylton RN attempted to access port with 1 inch nicole needle. Second RN attempted to access port with the same outcome. Port appears to have flipped over. Labs drawn peripherally and sent for processing. Will notify Dr. Carole Wynne office of this information. Patient states that she has an appointment with him on 1/30/2020 and that she is supposed to get her port removed in February of 2020.

## 2020-01-13 ENCOUNTER — TELEPHONE (OUTPATIENT)
Dept: GYNECOLOGY | Age: 65
End: 2020-01-13

## 2020-01-13 DIAGNOSIS — C55 UTERINE CARCINOSARCOMA (HCC): Primary | ICD-10-CM

## 2020-01-13 NOTE — TELEPHONE ENCOUNTER
Pt returned my call and states she will try to go to [facility] on 1/15/20 for her chest xray to evaluate her port a cath. She will call back that afternoon for results.

## 2020-01-15 ENCOUNTER — HOSPITAL ENCOUNTER (OUTPATIENT)
Dept: GENERAL RADIOLOGY | Age: 65
Discharge: HOME | End: 2020-01-15
Payer: MEDICARE

## 2020-01-15 DIAGNOSIS — C55 UTERINE CARCINOSARCOMA (HCC): ICD-10-CM

## 2020-01-15 PROCEDURE — 71046 X-RAY EXAM CHEST 2 VIEWS: CPT

## 2020-01-21 ENCOUNTER — TELEPHONE (OUTPATIENT)
Dept: GYNECOLOGY | Age: 65
End: 2020-01-21

## 2020-01-21 NOTE — TELEPHONE ENCOUNTER
Message left on am per Dr. Marianna Arnett results of her chest xray that the port did not flip and is in place.

## 2020-01-30 ENCOUNTER — OFFICE VISIT (OUTPATIENT)
Dept: GYNECOLOGY | Age: 65
End: 2020-01-30

## 2020-01-30 VITALS
WEIGHT: 223 LBS | HEART RATE: 78 BPM | HEIGHT: 67 IN | BODY MASS INDEX: 35 KG/M2 | SYSTOLIC BLOOD PRESSURE: 91 MMHG | DIASTOLIC BLOOD PRESSURE: 73 MMHG

## 2020-01-30 DIAGNOSIS — C55 UTERINE CARCINOSARCOMA (HCC): Primary | ICD-10-CM

## 2020-01-30 NOTE — PROGRESS NOTES
[address] [place] [address] Mathias Moritz 857, [address]  P [phone]  F [phone]    Office Note  Patient ID:  Name:  Francoise Hoyos  MRN:  [id number]  :  1955/65 y.o. Date:  2020      HISTORY OF PRESENT ILLNESS:  Luz Rodríguez is a 72 y.o.  postmenopausal female with a diagnosis of stage IA uterine carcinosarcoma. She underwent robotic hysterectomy with staging in late 2018. Pathology revealed LVSI but her sentinel nodes were negative. Pelvic washings were also negative. She was recommended adjuvant chemotherapy with Taxol/Carboplatin due to her risk of recurrence. She completed her 6 prescribed cycles of chemotherapy. She subsequently received pelvic radiation therapy with brachytherapy. She presents today for follow up. She is doing well. She has some urinary symptoms, but no GI complaints. She has no pain. Denies vaginal bleeding or discharge. ROS:   and GI review:  Negative  Cardiopulmonary review:  Negative   Musculoskeletal:  Negative    A comprehensive review of systems was negative except for that written in the History of Present Illness. , 10 point ROS        Problem List:  Patient Active Problem List    Diagnosis Date Noted    Anemia 2018    Severe obesity (BMI 35.0-39. 9) with comorbidity ([address] 2018    Elevated serum creatinine 2018    Uterine carcinosarcoma ([address] 2018    Obesity (BMI 30.0-34.9) 2018    Hypertension 2017    Chronic lung disease 2017    Stroke ([address] 2017     PMH:  Past Medical History:   Diagnosis Date    Cancer ([address]     UTERINE CA    Chronic pain     Hypercholesterolemia     Hypertension     Stroke ([address] 2007    R SIDE AFFECTED      PSH:  Past Surgical History:   Procedure Laterality Date    COLONOSCOPY,DIAGNOSTIC  2015         HX BREAST BIOPSY Right     right breast:  benign    HX GI      COLONOSCOPY    HX ORTHOPAEDIC GREAT L TOE BONE SPUR REMOVED      Social History:  Social History     Tobacco Use    Smoking status: Former Smoker     Last attempt to quit: 10/4/1991     Years since quittin.3    Smokeless tobacco: Never Used   Substance Use Topics    Alcohol use: No      Family History:  Family History   Problem Relation Age of Onset    Heart Disease Mother     Hypertension Mother     Diabetes Mother     Cancer Mother 62        breast cancer    Breast Cancer Mother         52's    Cancer Father 61        lung cancer    Stroke Brother     Stroke Maternal Grandmother     Stroke Maternal Grandfather     Kidney Disease Brother     Hypertension Brother       Medications: (reviewed)  Current Outpatient Medications   Medication Sig    nystatin-triamcinolone (MYCOLOG II) topical cream Apply  to affected area two (2) times a day.  ferrous sulfate 325 mg (65 mg iron) tablet TAKE 1 TABLET A DAY    amLODIPine (NORVASC) 10 mg tablet Take 10 mg by mouth daily.  meclizine (ANTIVERT) 25 mg tablet Take 25 mg by mouth as needed.  traMADol (ULTRAM) 50 mg tablet Take 50 mg by mouth nightly.  aspirin (ASPIRIN) 325 mg tablet Take 325 mg by mouth daily.  atorvastatin (LIPITOR) 10 mg tablet Take 10 mg by mouth daily.  clopidogrel (PLAVIX) 75 mg tablet Take 75 mg by mouth daily.  metoprolol (LOPRESSOR) 50 mg tablet Take 50 mg by mouth two (2) times a day.  oxybutynin (DITROPAN) 5 mg tablet Take 15 mg by mouth two (2) times a day.  SENNA-S 8.6-50 mg per tablet TK 1 T PO daily    lidocaine-prilocaine (EMLA) topical cream Apply small amount over port area one hour before chemo treatment and cover with a Band-Aid     No current facility-administered medications for this visit. Allergies: (reviewed)  Allergies   Allergen Reactions    Latex Itching    Pcn [Penicillins] Unknown (comments)     Pt's states she doesn't know reaction.           OBJECTIVE:    Physical Exam:  VITAL SIGNS: Vitals:    20 1109 BP: 91/73   Pulse: 78   Weight: 223 lb (101.2 kg)   Height: 5' 7\" (1.702 m)     Body mass index is 34.93 kg/m². GENERAL MELVIN: Conversant, alert, oriented. No acute distress. HEENT: HEENT. No thyroid enlargement. No JVD. Neck: Supple without restrictions. RESPIRATORY: Clear to auscultation and percussion to the bases. No CVAT. CARDIOVASC: RRR without murmur/rub. GASTROINT: soft, non-tender, without masses or organomegaly   MUSCULOSKEL: no joint tenderness, deformity or swelling   EXTREMITIES: extremities normal, atraumatic, no cyanosis or edema   PELVIC: Normal external genitalia. Normal vagina. Uterus, cervix, and adnexa absent. No masses or nodularity. RECTAL: Deferred   JOHN SURVEY: No suspicious lymphadenopathy or edema noted. NEURO: Grossly intact. No acute deficit. Lab Results   Component Value Date/Time    WBC 5.9 12/07/2018 10:31 AM    HGB 11.1 (L) 12/07/2018 10:31 AM    HCT 36.1 12/07/2018 10:31 AM    PLATELET 202 47/33/4823 10:31 AM    MCV 98.9 12/07/2018 10:31 AM     Lab Results   Component Value Date/Time    Sodium 139 12/07/2018 10:31 AM    Potassium 4.3 12/07/2018 10:31 AM    Chloride 105 12/07/2018 10:31 AM    CO2 26 12/07/2018 10:31 AM    Anion gap 8 12/07/2018 10:31 AM    Glucose 93 12/07/2018 10:31 AM    BUN 18 12/07/2018 10:31 AM    Creatinine 1.17 (H) 12/07/2018 10:31 AM    BUN/Creatinine ratio 15 12/07/2018 10:31 AM    GFR est AA 57 (L) 12/07/2018 10:31 AM    GFR est non-AA 47 (L) 12/07/2018 10:31 AM    Calcium 9.3 12/07/2018 10:31 AM     Lab Results   Component Value Date/Time    CA-125 12 01/10/2020 11:17 AM    Cancer Ag (CA) 125 27.0 05/21/2018 11:04 AM       PET/CT (7/23/18)  HEAD/NECK: No apparent foci of abnormal hypermetabolism. Cerebral evaluation is  limited by normal intense activity. Old left MCA infarct is noted.     CHEST: No foci of abnormal hypermetabolism.     ABDOMEN/PELVIS: No foci of abnormal hypermetabolism.  Left adrenal nodules are  stable and do not demonstrate increased tracer activity. The uterus is  surgically absent.     SKELETON: No foci of abnormal hypermetabolism in the axial and visualized  appendicular skeleton.     IMPRESSION: No Foci of Abnormal Hypermetabolism. IMPRESSION/PLAN:  Luz Becerra is a 72 y.o. female with a diagnosis of stage IA uterine carcinosarcoma. Following surgical resection she received 6 cycles of Taxol/Carboplatin chemotherapy, followed by pelvic radiation therapy. She has no evidence of disease on today's exam.  I will see her back in 3 months for continued surveillance.         Signed By: Simone Stubbs MD     1/30/2020/12:10 PM

## 2020-01-30 NOTE — PROGRESS NOTES
3 month follow up for uterine carcinosarcoma ,  pt reports no abnormal spotting or bleeding, pt states she has no questions or concerns for today's visit    1. Have you been to the ER, urgent care clinic since your last visit? Hospitalized since your last visit?  no    2. Have you seen or consulted any other health care providers outside of the [address] [place] [address] since your last visit? Include any pap smears or colon screening.    no

## 2020-02-13 ENCOUNTER — OFFICE VISIT (OUTPATIENT)
Dept: NEUROLOGY | Age: 65
End: 2020-02-13

## 2020-02-13 VITALS
BODY MASS INDEX: 34.84 KG/M2 | DIASTOLIC BLOOD PRESSURE: 90 MMHG | HEIGHT: 67 IN | HEART RATE: 74 BPM | SYSTOLIC BLOOD PRESSURE: 126 MMHG | OXYGEN SATURATION: 96 % | WEIGHT: 222 LBS

## 2020-02-13 DIAGNOSIS — I69.359 SPASTIC HEMIPARESIS AS LATE EFFECT OF CEREBRAL INFARCTION (HCC): ICD-10-CM

## 2020-02-13 DIAGNOSIS — I69.30 CHRONIC ISCHEMIC LEFT MCA STROKE: Primary | ICD-10-CM

## 2020-02-13 RX ORDER — FLUTICASONE PROPIONATE 50 MCG
2 SPRAY, SUSPENSION (ML) NASAL DAILY
COMMUNITY
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
## 2020-02-13 NOTE — PATIENT INSTRUCTIONS
A Healthy Lifestyle: Care Instructions  Your Care Instructions    A healthy lifestyle can help you feel good, stay at a healthy weight, and have plenty of energy for both work and play. A healthy lifestyle is something you can share with your whole family. A healthy lifestyle also can lower your risk for serious health problems, such as high blood pressure, heart disease, and diabetes. You can follow a few steps listed below to improve your health and the health of your family. Follow-up care is a key part of your treatment and safety. Be sure to make and go to all appointments, and call your doctor if you are having problems. It's also a good idea to know your test results and keep a list of the medicines you take. How can you care for yourself at home? · Do not eat too much sugar, fat, or fast foods. You can still have dessert and treats now and then. The goal is moderation. · Start small to improve your eating habits. Pay attention to portion sizes, drink less juice and soda pop, and eat more fruits and vegetables. ? Eat a healthy amount of food. A 3-ounce serving of meat, for example, is about the size of a deck of cards. Fill the rest of your plate with vegetables and whole grains. ? Limit the amount of soda and sports drinks you have every day. Drink more water when you are thirsty. ? Eat at least 5 servings of fruits and vegetables every day. It may seem like a lot, but it is not hard to reach this goal. A serving or helping is 1 piece of fruit, 1 cup of vegetables, or 2 cups of leafy, raw vegetables. Have an apple or some carrot sticks as an afternoon snack instead of a candy bar. Try to have fruits and/or vegetables at every meal.  · Make exercise part of your daily routine. You may want to start with simple activities, such as walking, bicycling, or slow swimming. Try to be active 30 to 60 minutes every day. You do not need to do all 30 to 60 minutes all at once.  For example, you can exercise 3 times a day for 10 or 20 minutes. Moderate exercise is safe for most people, but it is always a good idea to talk to your doctor before starting an exercise program.  · Keep moving. Tabitha Kelvin the lawn, work in the garden, or [employer]. Take the stairs instead of the elevator at work. · If you smoke, quit. People who smoke have an increased risk for heart attack, stroke, cancer, and other lung illnesses. Quitting is hard, but there are ways to boost your chance of quitting tobacco for good. ? Use nicotine gum, patches, or lozenges. ? Ask your doctor about stop-smoking programs and medicines. ? Keep trying. In addition to reducing your risk of diseases in the future, you will notice some benefits soon after you stop using tobacco. If you have shortness of breath or asthma symptoms, they will likely get better within a few weeks after you quit. · Limit how much alcohol you drink. Moderate amounts of alcohol (up to 2 drinks a day for men, 1 drink a day for women) are okay. But drinking too much can lead to liver problems, high blood pressure, and other health problems. Family health  If you have a family, there are many things you can do together to improve your health. · Eat meals together as a family as often as possible. · Eat healthy foods. This includes fruits, vegetables, lean meats and dairy, and whole grains. · Include your family in your fitness plan. Most people think of activities such as jogging or tennis as the way to fitness, but there are many ways you and your family can be more active. Anything that makes you breathe hard and gets your heart pumping is exercise. Here are some tips:  ? Walk to do errands or to take your child to school or the bus.  ? Go for a family bike ride after dinner instead of watching TV. Where can you learn more? Go to http://mario-neil.info/. Enter F406 in the search box to learn more about \"A Healthy Lifestyle: Care Instructions. \"  Current as of: May 28, 2019  Content Version: 12.2  © 9573-0185 [employer]. Care instructions adapted under license by [employer] (which disclaims liability or warranty for this information). If you have questions about a medical condition or this instruction, always ask your healthcare professional. [address] any warranty or liability for your use of this information. Office Policies    o Phone calls/patient messages:  Please allow up to 24 hours for someone in the office to contact you about your call or message. Be mindful your provider may be out of the office or your message may require further review. We encourage you to use [employer] for your messages as this is a faster, more efficient way to communicate with our office    o Medication Refills:  Prescription medications require up to 48 business hours to process. We encourage you to use [employer] for your refills. For controlled medications: Please allow up to 72 business hours to process. Certain medications may require you to  a written prescription at our office. NO narcotic/controlled medications will be prescribed after 4pm Monday through Friday or on weekends    o Form/Paperwork Completion:  We ask that you allow 7-14 business days. You may also download your forms to [employer] to have your doctor print off.

## 2020-02-27 ENCOUNTER — ANESTHESIA EVENT (OUTPATIENT)
Dept: SURGERY | Age: 65
End: 2020-02-27
Payer: MEDICARE

## 2020-02-27 NOTE — PERIOP NOTES
PAT TELEPHONE INTERVIEW COMPLETED. PRE-OP INSTRUCTIONS REVIEWED WITH PATIENT WHICH INCLUDED INSTRUCTIONS ON MEDICATIONS AND NPO AFTER MIDNIGHT. INSTRUCTIONS GIVEN ON THE USE OF CHLORHEXIDINE SOLUTION THE EVENING BEFOE AND THE MORNING OF SURGERY. PATIENT DOUBTS SHE WILL BE ABLE TO GET SOLUTION TO USE BEFORE SURGERY. STATES SHE HAS NOT RECEIVED ANY INFORMATION FROM SURGEON'S OFFICE EXCEPT THAT THEY RESCHEDULED HER SURGERY FROM ANOTHER DATE TO TOMORROW.

## 2020-02-27 NOTE — H&P
[address] [place] [address] [address], [address] [facility]  P [phone]  F [phone]      Patient ID:  Name:  Annia Leavitt  MRN:  [id number]  :  1955/65 y.o. Date:  2020      HISTORY OF PRESENT ILLNESS:  Luz Hicks is a 72 y.o.  postmenopausal female with a diagnosis of stage IA uterine carcinosarcoma. She underwent robotic hysterectomy with staging in late 2018. Pathology revealed LVSI but her sentinel nodes were negative. Pelvic washings were also negative. She was recommended adjuvant chemotherapy with Taxol/Carboplatin due to her risk of recurrence. She completed her 6 prescribed cycles of chemotherapy. She subsequently received pelvic radiation therapy with brachytherapy. She presents today for follow up. She is doing well. She has some urinary symptoms, but no GI complaints. She has no pain. Denies vaginal bleeding or discharge. ROS:   and GI review:  Negative  Cardiopulmonary review:  Negative   Musculoskeletal:  Negative    A comprehensive review of systems was negative except for that written in the History of Present Illness. , 10 point ROS        Problem List:  Patient Active Problem List    Diagnosis Date Noted    Anemia 2018    Severe obesity (BMI 35.0-39. 9) with comorbidity ([address] 2018    Elevated serum creatinine 2018    Uterine carcinosarcoma ([address] 2018    Obesity (BMI 30.0-34.9) 2018    Hypertension 2017    Chronic lung disease 2017    Stroke ([address] 2017     PMH:  Past Medical History:   Diagnosis Date    Cancer ([address]     UTERINE CA    Chronic pain     Hypercholesterolemia     Hypertension     Spastic hemiparesis of right dominant side ([address]     Stroke ([address] 2007    R SIDE AFFECTED      PSH:  Past Surgical History:   Procedure Laterality Date    COLONOSCOPY,DIAGNOSTIC  2015         HX BREAST BIOPSY Right 1993    right breast:  benign    HX GI      COLONOSCOPY    HX ORTHOPAEDIC      GREAT L TOE BONE SPUR REMOVED      Social History:  Social History     Tobacco Use    Smoking status: Former Smoker     Last attempt to quit: 10/4/1991     Years since quittin.4    Smokeless tobacco: Never Used   Substance Use Topics    Alcohol use: No      Family History:  Family History   Problem Relation Age of Onset    Heart Disease Mother     Hypertension Mother     Diabetes Mother     Cancer Mother 62        breast cancer    Breast Cancer Mother         52's    Cancer Father 61        lung cancer    Stroke Brother     Stroke Maternal Grandmother     Stroke Maternal Grandfather     Kidney Disease Brother     Hypertension Brother       Medications: (reviewed)  No current facility-administered medications for this encounter. Allergies: (reviewed)  Allergies   Allergen Reactions    Latex Itching    Pcn [Penicillins] Unknown (comments)     Pt's states she doesn't know reaction. OBJECTIVE:    Physical Exam:  VITAL SIGNS: Vitals:    20 1519   Weight: 222 lb (100.7 kg)   Height: 5' 8.5\" (1.74 m)     Body mass index is 33.26 kg/m². GENERAL MELVIN: Conversant, alert, oriented. No acute distress. HEENT: HEENT. No thyroid enlargement. No JVD. Neck: Supple without restrictions. RESPIRATORY: Clear to auscultation and percussion to the bases. No CVAT. CARDIOVASC: RRR without murmur/rub. GASTROINT: soft, non-tender, without masses or organomegaly   MUSCULOSKEL: no joint tenderness, deformity or swelling   EXTREMITIES: extremities normal, atraumatic, no cyanosis or edema   PELVIC: Normal external genitalia. Normal vagina. Uterus, cervix, and adnexa absent. No masses or nodularity. RECTAL: Deferred   JOHN SURVEY: No suspicious lymphadenopathy or edema noted. NEURO: Grossly intact. No acute deficit.          Lab Results   Component Value Date/Time    WBC 5.9 2018 10:31 AM    HGB 11.1 (L) 2018 10:31 AM    HCT 36.1 12/07/2018 10:31 AM    PLATELET 437 76/66/2410 10:31 AM    MCV 98.9 12/07/2018 10:31 AM     Lab Results   Component Value Date/Time    Sodium 139 12/07/2018 10:31 AM    Potassium 4.3 12/07/2018 10:31 AM    Chloride 105 12/07/2018 10:31 AM    CO2 26 12/07/2018 10:31 AM    Anion gap 8 12/07/2018 10:31 AM    Glucose 93 12/07/2018 10:31 AM    BUN 18 12/07/2018 10:31 AM    Creatinine 1.17 (H) 12/07/2018 10:31 AM    BUN/Creatinine ratio 15 12/07/2018 10:31 AM    GFR est AA 57 (L) 12/07/2018 10:31 AM    GFR est non-AA 47 (L) 12/07/2018 10:31 AM    Calcium 9.3 12/07/2018 10:31 AM     Lab Results   Component Value Date/Time    CA-125 12 01/10/2020 11:17 AM    Cancer Ag (CA) 125 27.0 05/21/2018 11:04 AM       PET/CT (7/23/18)  HEAD/NECK: No apparent foci of abnormal hypermetabolism. Cerebral evaluation is  limited by normal intense activity. Old left MCA infarct is noted.     CHEST: No foci of abnormal hypermetabolism.     ABDOMEN/PELVIS: No foci of abnormal hypermetabolism. Left adrenal nodules are  stable and do not demonstrate increased tracer activity. The uterus is  surgically absent.     SKELETON: No foci of abnormal hypermetabolism in the axial and visualized  appendicular skeleton.     IMPRESSION: No Foci of Abnormal Hypermetabolism. IMPRESSION/PLAN:  Luz Hill is a 72 y.o. female with a diagnosis of stage IA uterine carcinosarcoma. Following surgical resection she received 6 cycles of Taxol/Carboplatin chemotherapy, followed by pelvic radiation therapy. She has no evidence of disease on today's exam.  She wishes to have her IV port removed at this time. Signed By: Elsie Cormier MD     2/28/2020/12:10 PM       Date of Surgery Update:  Luz Hill was seen and examined. History and physical has been reviewed. The patient has been examined.  There have been no significant clinical changes since the completion of the originally dated History and Physical.  Patient identified by surgeon; surgical site was confirmed by patient and surgeon.     Signed By: Dexter Fernando MD     February 28, 2020 3:15 PM

## 2020-02-28 ENCOUNTER — APPOINTMENT (OUTPATIENT)
Dept: INFUSION THERAPY | Age: 65
End: 2020-02-28

## 2020-02-28 ENCOUNTER — HOSPITAL ENCOUNTER (OUTPATIENT)
Age: 65
Setting detail: OUTPATIENT SURGERY
Discharge: HOME | End: 2020-02-28
Attending: OBSTETRICS & GYNECOLOGY | Admitting: OBSTETRICS & GYNECOLOGY
Payer: MEDICARE

## 2020-02-28 ENCOUNTER — ANESTHESIA (OUTPATIENT)
Dept: SURGERY | Age: 65
End: 2020-02-28
Payer: MEDICARE

## 2020-02-28 VITALS
HEART RATE: 68 BPM | BODY MASS INDEX: 32.88 KG/M2 | SYSTOLIC BLOOD PRESSURE: 123 MMHG | HEIGHT: 69 IN | TEMPERATURE: 97.8 F | WEIGHT: 222 LBS | DIASTOLIC BLOOD PRESSURE: 65 MMHG | OXYGEN SATURATION: 100 % | RESPIRATION RATE: 16 BRPM

## 2020-02-28 PROCEDURE — 77030040922 HC BLNKT HYPOTHRM STRY -A

## 2020-02-28 PROCEDURE — 76210000006 HC OR PH I REC 0.5 TO 1 HR: Performed by: OBSTETRICS & GYNECOLOGY

## 2020-02-28 PROCEDURE — 76210000026 HC REC RM PH II 1 TO 1.5 HR: Performed by: OBSTETRICS & GYNECOLOGY

## 2020-02-28 PROCEDURE — 77030011640 HC PAD GRND REM COVD -A: Performed by: OBSTETRICS & GYNECOLOGY

## 2020-02-28 PROCEDURE — 76060000032 HC ANESTHESIA 0.5 TO 1 HR: Performed by: OBSTETRICS & GYNECOLOGY

## 2020-02-28 PROCEDURE — 74011250636 HC RX REV CODE- 250/636: Performed by: ANESTHESIOLOGY

## 2020-02-28 PROCEDURE — 74011000250 HC RX REV CODE- 250: Performed by: OBSTETRICS & GYNECOLOGY

## 2020-02-28 PROCEDURE — 74011000250 HC RX REV CODE- 250: Performed by: NURSE ANESTHETIST, CERTIFIED REGISTERED

## 2020-02-28 PROCEDURE — 77030031139 HC SUT VCRL2 J&J -A: Performed by: OBSTETRICS & GYNECOLOGY

## 2020-02-28 PROCEDURE — 77030002933 HC SUT MCRYL J&J -A: Performed by: OBSTETRICS & GYNECOLOGY

## 2020-02-28 PROCEDURE — 77030010507 HC ADH SKN DERMBND J&J -B: Performed by: OBSTETRICS & GYNECOLOGY

## 2020-02-28 PROCEDURE — 74011250636 HC RX REV CODE- 250/636: Performed by: NURSE ANESTHETIST, CERTIFIED REGISTERED

## 2020-02-28 PROCEDURE — 76010000154 HC OR TIME FIRST 0.5 HR: Performed by: OBSTETRICS & GYNECOLOGY

## 2020-02-28 RX ORDER — DEXAMETHASONE SODIUM PHOSPHATE 4 MG/ML
INJECTION, SOLUTION INTRA-ARTICULAR; INTRALESIONAL; INTRAMUSCULAR; INTRAVENOUS; SOFT TISSUE AS NEEDED
Status: DISCONTINUED | OUTPATIENT
Start: 2020-02-28 | End: 2020-02-28 | Stop reason: HOSPADM

## 2020-02-28 RX ORDER — SODIUM CHLORIDE 0.9 % (FLUSH) 0.9 %
5-40 SYRINGE (ML) INJECTION EVERY 8 HOURS
Status: DISCONTINUED | OUTPATIENT
Start: 2020-02-28 | End: 2020-02-28 | Stop reason: HOSPADM

## 2020-02-28 RX ORDER — SODIUM CHLORIDE 0.9 % (FLUSH) 0.9 %
5-40 SYRINGE (ML) INJECTION AS NEEDED
Status: DISCONTINUED | OUTPATIENT
Start: 2020-02-28 | End: 2020-02-28 | Stop reason: HOSPADM

## 2020-02-28 RX ORDER — MIDAZOLAM HYDROCHLORIDE 1 MG/ML
INJECTION, SOLUTION INTRAMUSCULAR; INTRAVENOUS AS NEEDED
Status: DISCONTINUED | OUTPATIENT
Start: 2020-02-28 | End: 2020-02-28 | Stop reason: HOSPADM

## 2020-02-28 RX ORDER — PROPOFOL 10 MG/ML
INJECTION, EMULSION INTRAVENOUS AS NEEDED
Status: DISCONTINUED | OUTPATIENT
Start: 2020-02-28 | End: 2020-02-28 | Stop reason: HOSPADM

## 2020-02-28 RX ORDER — LIDOCAINE HYDROCHLORIDE 10 MG/ML
0.1 INJECTION, SOLUTION EPIDURAL; INFILTRATION; INTRACAUDAL; PERINEURAL AS NEEDED
Status: DISCONTINUED | OUTPATIENT
Start: 2020-02-28 | End: 2020-02-28 | Stop reason: HOSPADM

## 2020-02-28 RX ORDER — HYDROMORPHONE HYDROCHLORIDE 1 MG/ML
0.2 INJECTION, SOLUTION INTRAMUSCULAR; INTRAVENOUS; SUBCUTANEOUS
Status: DISCONTINUED | OUTPATIENT
Start: 2020-02-28 | End: 2020-02-28 | Stop reason: HOSPADM

## 2020-02-28 RX ORDER — MORPHINE SULFATE 10 MG/ML
2 INJECTION, SOLUTION INTRAMUSCULAR; INTRAVENOUS
Status: DISCONTINUED | OUTPATIENT
Start: 2020-02-28 | End: 2020-02-28 | Stop reason: HOSPADM

## 2020-02-28 RX ORDER — PROPOFOL 10 MG/ML
INJECTION, EMULSION INTRAVENOUS
Status: DISCONTINUED | OUTPATIENT
Start: 2020-02-28 | End: 2020-02-28 | Stop reason: HOSPADM

## 2020-02-28 RX ORDER — FENTANYL CITRATE 50 UG/ML
25 INJECTION, SOLUTION INTRAMUSCULAR; INTRAVENOUS
Status: DISCONTINUED | OUTPATIENT
Start: 2020-02-28 | End: 2020-02-28 | Stop reason: HOSPADM

## 2020-02-28 RX ORDER — ONDANSETRON 2 MG/ML
INJECTION INTRAMUSCULAR; INTRAVENOUS AS NEEDED
Status: DISCONTINUED | OUTPATIENT
Start: 2020-02-28 | End: 2020-02-28 | Stop reason: HOSPADM

## 2020-02-28 RX ORDER — ONDANSETRON 2 MG/ML
4 INJECTION INTRAMUSCULAR; INTRAVENOUS AS NEEDED
Status: DISCONTINUED | OUTPATIENT
Start: 2020-02-28 | End: 2020-02-28 | Stop reason: HOSPADM

## 2020-02-28 RX ORDER — SODIUM CHLORIDE, SODIUM LACTATE, POTASSIUM CHLORIDE, CALCIUM CHLORIDE 600; 310; 30; 20 MG/100ML; MG/100ML; MG/100ML; MG/100ML
50 INJECTION, SOLUTION INTRAVENOUS CONTINUOUS
Status: DISCONTINUED | OUTPATIENT
Start: 2020-02-28 | End: 2020-02-28 | Stop reason: HOSPADM

## 2020-02-28 RX ORDER — LIDOCAINE HYDROCHLORIDE 20 MG/ML
INJECTION, SOLUTION EPIDURAL; INFILTRATION; INTRACAUDAL; PERINEURAL AS NEEDED
Status: DISCONTINUED | OUTPATIENT
Start: 2020-02-28 | End: 2020-02-28 | Stop reason: HOSPADM

## 2020-02-28 RX ADMIN — PROPOFOL 75 MCG/KG/MIN: 10 INJECTION, EMULSION INTRAVENOUS at 08:27

## 2020-02-28 RX ADMIN — LIDOCAINE HYDROCHLORIDE 40 MG: 20 INJECTION, SOLUTION EPIDURAL; INFILTRATION; INTRACAUDAL; PERINEURAL at 08:28

## 2020-02-28 RX ADMIN — PROPOFOL 30 MG: 10 INJECTION, EMULSION INTRAVENOUS at 08:27

## 2020-02-28 RX ADMIN — ONDANSETRON HYDROCHLORIDE 4 MG: 2 INJECTION, SOLUTION INTRAMUSCULAR; INTRAVENOUS at 08:27

## 2020-02-28 RX ADMIN — SODIUM CHLORIDE, POTASSIUM CHLORIDE, SODIUM LACTATE AND CALCIUM CHLORIDE: 600; 310; 30; 20 INJECTION, SOLUTION INTRAVENOUS at 08:05

## 2020-02-28 RX ADMIN — PROPOFOL 20 MG: 10 INJECTION, EMULSION INTRAVENOUS at 08:36

## 2020-02-28 RX ADMIN — PROPOFOL 30 MG: 10 INJECTION, EMULSION INTRAVENOUS at 08:31

## 2020-02-28 RX ADMIN — DEXAMETHASONE SODIUM PHOSPHATE 8 MG: 4 INJECTION, SOLUTION INTRAMUSCULAR; INTRAVENOUS at 08:27

## 2020-02-28 RX ADMIN — MIDAZOLAM 2 MG: 1 INJECTION INTRAMUSCULAR; INTRAVENOUS at 08:18

## 2020-02-28 NOTE — ANESTHESIA PREPROCEDURE EVALUATION
Relevant Problems   No relevant active problems       Anesthetic History               Review of Systems / Medical History  Patient summary reviewed, nursing notes reviewed and pertinent labs reviewed    Pulmonary                   Neuro/Psych       CVA  TIA     Cardiovascular    Hypertension              Exercise tolerance: >4 METS     GI/Hepatic/Renal                Endo/Other             Other Findings              Physical Exam    Airway  Mallampati: I  TM Distance: > 6 cm  Neck ROM: normal range of motion   Mouth opening: Normal     Cardiovascular    Rhythm: regular  Rate: normal         Dental  No notable dental hx       Pulmonary  Breath sounds clear to auscultation               Abdominal         Other Findings            Anesthetic Plan    ASA: 3  Anesthesia type: MAC          Induction: Intravenous  Anesthetic plan and risks discussed with: Patient

## 2020-02-28 NOTE — OP NOTES
Gynecologic Oncology Operative Report    June Robinson 2/28/2020    Pre-operative diagnosis:   1) Endometrial CA      2) No longer requires venous access for chemotherapy     Post-operative diagnosis: 1) Endometrial CA      2) No longer requires venous access for chemotherapy    Procedure:  Intravenous port removal    Surgeon:  Arely Neville MD    Assistant:  N/A    Anesthesia:  MAC, plus local    EBL:  Minimal    Implants:  None    Complications:  None    Operative indications:  73 yo BF with advanced endometrial cancer, s/p chemotherapy. She no longer needs her IV port and desires removal.    Operative Findings:  Normal anatomy. Procedure in detail:  After the risks, benefits, indications, and alternatives of the procedure were discussed with the patient and informed consent was obtained, the patient was taken to the operating room. She was then correctly identified, administered IV sedation per anesthesia, and then prepped and draped in the supine position in the usual fashion. Her arms were also tucked at each side. The anatomy of the anterior thoracic wall was noted. 1% lidocaine without epinephrine was then injected along the previous incision overlying the port. A skin incision was then made transversely with a #15-blade scalpel in the same location. The incision was then carried down to the the level of the port with electro-cautery. The port was then grasped and manipulated so that theProline sutures securing it in place could be cut and removed. The port and catheter were then removed. The catheter tunnel was ligated with a figure-of-eight 3-0 Vicryl suture. The port pocket was then irrigated and made hemostatic with electro-cautery. The incision was then closed in two layers. The subcutaneous fat was reapproximated with a running 3-0 Vicryl. The skin was then reapproximated with a 4-0 Monocryl in a running subcuticular fashion.   Steri-strips were then applied, followed by a sterile pressure dressing. The patient was then awakened from anesthesia and taken to the recovery room in stable condition. All instrument, sponge, and needle counts were correct.       Walker Eldridge MD  2/28/2020  8:47 AM

## 2020-02-28 NOTE — BRIEF OP NOTE
BRIEF OPERATIVE NOTE    Date of Procedure: 2/28/2020   Preoperative Diagnosis: ENDOMETRIAL CANCER  Postoperative Diagnosis: ENDOMETRIAL CANCER    Procedure(s):  PORTACATH REMOVAL  Surgeon(s) and Role:     Sonal Gonzalez MD - Primary         Surgical Assistant: None    Surgical Staff:  Circ-1: Alda Reynolds RN  Circ-2: Serjio Padilla  Scrub Tech-1: Sharon Abler  Event Time In Time Out   Incision Start 1222    Incision Close 8648      Anesthesia: MAC   Estimated Blood Loss: <5 cc  Specimens: * No specimens in log *   Findings: Normal anatomy.    Complications: None  Implants: * No implants in log *        Arthur Rivera MD

## 2020-02-28 NOTE — DISCHARGE INSTRUCTIONS
Patient Education        Wound Check: Care Instructions  Your Care Instructions  People have wounds that need care for many reasons. You may have a cut that needs care after surgery. You may have a cut or puncture wound from an accident. Or you may have a wound because of a condition like diabetes. Whatever the cause of your wound, there are things you can do to care for it at home. Your doctor may also want you to come back for a wound check. The wound check lets the doctor know how your wound is healing and if you need more treatment. Follow-up care is a key part of your treatment and safety. Be sure to make and go to all appointments, and call your doctor if you are having problems. It's also a good idea to know your test results and keep a list of the medicines you take. How can you care for yourself at home? · Keep the wound dry for the first 24 to 48 hours. After this, you can shower if your doctor okays it. Pat the wound dry. · Be safe with medicines. Read and follow all instructions on the label. ? If the doctor gave you a prescription medicine for pain, take it as prescribed. ? If you are not taking a prescription pain medicine, ask your doctor if you can take an over-the-counter medicine. · If possible, prop up the injured area on a pillow anytime you sit or lie down during the next 3 days. Try to keep it above the level of your heart. This will help reduce swelling. When should you call for help? Call your doctor now or seek immediate medical care if:    · You have new pain, or the pain gets worse.     · The skin near the wound is cold or pale or changes color.     · You have tingling, weakness, or numbness near the wound.     · The wound starts to bleed, and blood soaks through the bandage. Oozing small amounts of blood is normal.     · You have symptoms of infection, such as:  ? Increased pain, swelling, warmth, or redness. ? Red streaks leading from the wound.   ? Pus draining from the wound.  ? A fever.    Watch closely for changes in your health, and be sure to contact your doctor if:    · You do not get better as expected. Where can you learn more? Go to http://mario-neil.info/. Enter P342 in the search box to learn more about \"Wound Check: Care Instructions. \"  Current as of: June 26, 2019  Content Version: 12.2  © 2452-2685 [employer]. Care instructions adapted under license by [employer] (which disclaims liability or warranty for this information). If you have questions about a medical condition or this instruction, always ask your healthcare professional. [address] any warranty or liability for your use of this information. ______________________________________________________________________    Anesthesia Discharge Instructions    After general anesthesia or intervenous sedation, for 24 hours or while taking prescription Narcotics:  · Limit your activities  · Do not drive or operate hazardous machinery  · If you have not urinated within 8 hours after discharge, please contact your surgeon on call. · Do not make important personal or business decisions  · Do not drink alcoholic beverages    Report the following to your surgeon:  · Excessive pain, swelling, redness or odor of or around the surgical area  · Temperature over 100.5 degrees  · Nausea and vomiting lasting longer than 4 hours or if unable to take medication  · Any signs of decreased circulation or nerve impairment to extremity:  Change in color, persistent numbness, tingling, coldness or increased pain.   · Any questions

## 2020-02-28 NOTE — ANESTHESIA POSTPROCEDURE EVALUATION
Post-Anesthesia Evaluation and Assessment    Patient: Tiffanie Sullivan MRN: [id number]  SSN: [id number]    YOB: 1955  Age: 72 y.o. Sex: female      I have evaluated the patient and they are stable and ready for discharge from the PACU. Cardiovascular Function/Vital Signs  Visit Vitals  /65   Pulse 61   Temp 36.4 °C (97.6 °F)   Resp 11   Ht 5' 8.5\" (1.74 m)   Wt 100.7 kg (222 lb)   SpO2 96%   BMI 33.26 kg/m²       Patient is status post MAC anesthesia for Procedure(s):  PORTACATH REMOVAL. Nausea/Vomiting: None    Postoperative hydration reviewed and adequate. Pain:  Pain Scale 1: Numeric (0 - 10) (02/28/20 0926)  Pain Intensity 1: 0 (02/28/20 0926)   Managed    Neurological Status:   Neuro (WDL): Within Defined Limits (02/28/20 0926)  Neuro  LUE Motor Response: Purposeful (02/28/20 0856)  LLE Motor Response: Purposeful (02/28/20 0856)  RUE Motor Response: Purposeful (02/28/20 0856)  RLE Motor Response: Purposeful (02/28/20 0856)   At baseline    Mental Status, Level of Consciousness: Alert and  oriented to person, place, and time    Pulmonary Status:   O2 Device: Room air (02/28/20 0926)   Adequate oxygenation and airway patent    Complications related to anesthesia: None    Post-anesthesia assessment completed. No concerns    Signed By: Gail Prieto MD     February 28, 2020              Procedure(s):  PORTACATH REMOVAL. MAC    <BSHSIANPOST>    Vitals Value Taken Time   /65 2/28/2020  9:30 AM   Temp 36.4 °C (97.6 °F) 2/28/2020  9:26 AM   Pulse 60 2/28/2020  9:42 AM   Resp 14 2/28/2020  9:42 AM   SpO2 92 % 2/28/2020  9:42 AM   Vitals shown include unvalidated device data.

## 2020-03-06 ENCOUNTER — TELEPHONE (OUTPATIENT)
Dept: GYNECOLOGY | Age: 65
End: 2020-03-06

## 2020-03-06 NOTE — TELEPHONE ENCOUNTER
Patient reports itching at the site. She denies redness, swelling or rash. I advised to try hydrocortisone or Benadryl cream and call with further concerns.

## 2020-03-06 NOTE — TELEPHONE ENCOUNTER
Patient had port removed a week ago. She c/o itching at the site, and wants to know can something be prescribed.

## 2020-04-24 ENCOUNTER — APPOINTMENT (OUTPATIENT)
Dept: INFUSION THERAPY | Age: 65
End: 2020-04-24

## 2020-05-26 ENCOUNTER — HOSPITAL ENCOUNTER (OUTPATIENT)
Dept: MAMMOGRAPHY | Age: 65
Discharge: HOME | End: 2020-05-26
Attending: INTERNAL MEDICINE
Payer: MEDICARE

## 2020-05-26 DIAGNOSIS — Z12.31 VISIT FOR SCREENING MAMMOGRAM: ICD-10-CM

## 2020-05-26 PROCEDURE — 77067 SCR MAMMO BI INCL CAD: CPT

## 2020-06-19 ENCOUNTER — APPOINTMENT (OUTPATIENT)
Dept: INFUSION THERAPY | Age: 65
End: 2020-06-19

## 2020-07-29 NOTE — PROGRESS NOTES
Six month check up for history of uterine carcinosarcoma. Pt states no abnormal spotting, bleeding or pain. 1. Have you been to the ER, urgent care clinic since your last visit? Hospitalized since your last visit?no  2. Have you seen or consulted any other health care providers outside of the [address] [place] [address] since your last visit? Include any pap smears or colon screening.  no

## 2020-07-30 ENCOUNTER — OFFICE VISIT (OUTPATIENT)
Dept: GYNECOLOGY | Age: 65
End: 2020-07-30

## 2020-07-30 VITALS
DIASTOLIC BLOOD PRESSURE: 84 MMHG | SYSTOLIC BLOOD PRESSURE: 141 MMHG | HEART RATE: 70 BPM | WEIGHT: 239 LBS | BODY MASS INDEX: 35.4 KG/M2 | HEIGHT: 69 IN

## 2020-07-30 DIAGNOSIS — C55 UTERINE CARCINOSARCOMA (HCC): Primary | ICD-10-CM

## 2020-07-30 NOTE — PROGRESS NOTES
[address] [place] [address] [address], [address] [place] [address] Av  P [phone]  F [phone]    Office Note  Patient ID:  Name:  Azalea Reilly  MRN:  [id number]  :  1955/65 y.o. Date:  2020      HISTORY OF PRESENT ILLNESS:  Luz Ayala is a 72 y.o.  postmenopausal female with a diagnosis of stage IA uterine carcinosarcoma. She underwent robotic hysterectomy with staging in late 2018. Pathology revealed LVSI but her sentinel nodes were negative. Pelvic washings were also negative. She was recommended adjuvant chemotherapy with Taxol/Carboplatin due to her risk of recurrence. She completed her 6 prescribed cycles of chemotherapy. She subsequently received pelvic radiation therapy with brachytherapy. She presents today for follow up. She is doing well. She has some urinary symptoms, but no GI complaints. She has no pain. Denies vaginal bleeding or discharge. ROS:   and GI review:  Negative  Cardiopulmonary review:  Negative   Musculoskeletal:  Negative    A comprehensive review of systems was negative except for that written in the History of Present Illness. , 10 point ROS        Problem List:  Patient Active Problem List    Diagnosis Date Noted    Anemia 2018    Severe obesity (BMI 35.0-39. 9) with comorbidity ([address] 2018    Elevated serum creatinine 2018    Uterine carcinosarcoma ([address] 2018    Obesity (BMI 30.0-34.9) 2018    Hypertension 2017    Chronic lung disease 2017    Stroke ([address] 2017     PMH:  Past Medical History:   Diagnosis Date    Cancer ([address]     UTERINE CA    Chronic pain     Hypercholesterolemia     Hypertension     Spastic hemiparesis of right dominant side ([address]     Stroke ([address] 2007    R SIDE AFFECTED      PSH:  Past Surgical History:   Procedure Laterality Date    COLONOSCOPY,DIAGNOSTIC  2015         HX BREAST BIOPSY Right 1993    right breast: benign    HX GI      COLONOSCOPY    HX ORTHOPAEDIC      GREAT L TOE BONE SPUR REMOVED      Social History:  Social History     Tobacco Use    Smoking status: Former Smoker     Last attempt to quit: 10/4/1991     Years since quittin.8    Smokeless tobacco: Never Used   Substance Use Topics    Alcohol use: No      Family History:  Family History   Problem Relation Age of Onset    Heart Disease Mother     Hypertension Mother     Diabetes Mother     Cancer Mother 62        breast cancer    Breast Cancer Mother         52's    Cancer Father 61        lung cancer    Stroke Brother     Stroke Maternal Grandmother     Stroke Maternal Grandfather     Kidney Disease Brother     Hypertension Brother       Medications: (reviewed)  Current Outpatient Medications   Medication Sig    fluticasone propionate (FLONASE ALLERGY RELIEF) 50 mcg/actuation nasal spray 2 Sprays by Both Nostrils route daily.  SENNA-S 8.6-50 mg per tablet TK 1 T PO daily    lidocaine-prilocaine (EMLA) topical cream Apply small amount over port area one hour before chemo treatment and cover with a Band-Aid    amLODIPine (NORVASC) 10 mg tablet Take 5 mg by mouth daily.  meclizine (ANTIVERT) 25 mg tablet Take 25 mg by mouth as needed.  traMADol (ULTRAM) 50 mg tablet Take 50 mg by mouth nightly.  aspirin (ASPIRIN) 325 mg tablet Take 325 mg by mouth daily.  atorvastatin (LIPITOR) 10 mg tablet Take 10 mg by mouth daily.  metoprolol (LOPRESSOR) 50 mg tablet Take 50 mg by mouth two (2) times a day.  oxybutynin (DITROPAN) 5 mg tablet Take 15 mg by mouth two (2) times a day.  ferrous sulfate 325 mg (65 mg iron) tablet TAKE 1 TABLET A DAY     No current facility-administered medications for this visit. Allergies: (reviewed)  Allergies   Allergen Reactions    Latex Itching    Pcn [Penicillins] Unknown (comments)     Pt's states she doesn't know reaction.           OBJECTIVE:    Physical Exam:  VITAL SIGNS: Vitals: 07/30/20 1120   BP: 141/84   Pulse: 70   Weight: 239 lb (108.4 kg)   Height: 5' 8.5\" (1.74 m)     Body mass index is 35.81 kg/m². GENERAL MELVIN: Conversant, alert, oriented. No acute distress. HEENT: HEENT. No thyroid enlargement. No JVD. Neck: Supple without restrictions. RESPIRATORY: Clear to auscultation and percussion to the bases. No CVAT. CARDIOVASC: RRR without murmur/rub. GASTROINT: soft, non-tender, without masses or organomegaly   MUSCULOSKEL: no joint tenderness, deformity or swelling   EXTREMITIES: extremities normal, atraumatic, no cyanosis or edema   PELVIC: Normal external genitalia. Normal vagina. Uterus, cervix, and adnexa absent. No masses or nodularity. RECTAL: Deferred   JOHN SURVEY: No suspicious lymphadenopathy or edema noted. NEURO: Grossly intact. No acute deficit. Lab Results   Component Value Date/Time    WBC 5.9 12/07/2018 10:31 AM    HGB 11.1 (L) 12/07/2018 10:31 AM    HCT 36.1 12/07/2018 10:31 AM    PLATELET 644 92/83/6987 10:31 AM    MCV 98.9 12/07/2018 10:31 AM     Lab Results   Component Value Date/Time    Sodium 139 12/07/2018 10:31 AM    Potassium 4.3 12/07/2018 10:31 AM    Chloride 105 12/07/2018 10:31 AM    CO2 26 12/07/2018 10:31 AM    Anion gap 8 12/07/2018 10:31 AM    Glucose 93 12/07/2018 10:31 AM    BUN 18 12/07/2018 10:31 AM    Creatinine 1.17 (H) 12/07/2018 10:31 AM    BUN/Creatinine ratio 15 12/07/2018 10:31 AM    GFR est AA 57 (L) 12/07/2018 10:31 AM    GFR est non-AA 47 (L) 12/07/2018 10:31 AM    Calcium 9.3 12/07/2018 10:31 AM     Lab Results   Component Value Date/Time    CA-125 12 01/10/2020 11:17 AM    Cancer Ag (CA) 125 27.0 05/21/2018 11:04 AM       PET/CT (7/23/18)  HEAD/NECK: No apparent foci of abnormal hypermetabolism. Cerebral evaluation is  limited by normal intense activity. Old left MCA infarct is noted.     CHEST: No foci of abnormal hypermetabolism.     ABDOMEN/PELVIS: No foci of abnormal hypermetabolism.  Left adrenal nodules are  stable and do not demonstrate increased tracer activity. The uterus is  surgically absent.     SKELETON: No foci of abnormal hypermetabolism in the axial and visualized  appendicular skeleton.     IMPRESSION: No Foci of Abnormal Hypermetabolism. IMPRESSION/PLAN:  Luz Betancourt is a 72 y.o. female with a diagnosis of stage IA uterine carcinosarcoma. Following surgical resection she received 6 cycles of Taxol/Carboplatin chemotherapy, followed by pelvic radiation therapy. She has no evidence of disease on today's exam.  I will see her back in 6 months for continued surveillance.         Signed By: Kiara Walton MD     7/30/2020/12:10 PM

## 2020-08-14 ENCOUNTER — APPOINTMENT (OUTPATIENT)
Dept: INFUSION THERAPY | Age: 65
End: 2020-08-14

## 2020-10-09 ENCOUNTER — APPOINTMENT (OUTPATIENT)
Dept: INFUSION THERAPY | Age: 65
End: 2020-10-09

## 2021-02-03 NOTE — PROGRESS NOTES
Six month check up. Pt states no abnormal spotting, bleeding or pain. 1. Have you been to the ER, urgent care clinic since your last visit? Hospitalized since your last visit?no  2. Have you seen or consulted any other health care providers outside of the [address] [place] [address] since your last visit? Include any pap smears or colon screening.  no

## 2021-02-04 ENCOUNTER — OFFICE VISIT (OUTPATIENT)
Dept: GYNECOLOGY | Age: 66
End: 2021-02-04
Payer: MEDICARE

## 2021-02-04 VITALS
HEIGHT: 69 IN | BODY MASS INDEX: 35.4 KG/M2 | DIASTOLIC BLOOD PRESSURE: 75 MMHG | WEIGHT: 239 LBS | SYSTOLIC BLOOD PRESSURE: 113 MMHG | HEART RATE: 70 BPM

## 2021-02-04 DIAGNOSIS — C55 UTERINE CARCINOSARCOMA (HCC): Primary | ICD-10-CM

## 2021-02-04 PROCEDURE — G8752 SYS BP LESS 140: HCPCS | Performed by: OBSTETRICS & GYNECOLOGY

## 2021-02-04 PROCEDURE — G8427 DOCREV CUR MEDS BY ELIG CLIN: HCPCS | Performed by: OBSTETRICS & GYNECOLOGY

## 2021-02-04 PROCEDURE — G8536 NO DOC ELDER MAL SCRN: HCPCS | Performed by: OBSTETRICS & GYNECOLOGY

## 2021-02-04 PROCEDURE — G8432 DEP SCR NOT DOC, RNG: HCPCS | Performed by: OBSTETRICS & GYNECOLOGY

## 2021-02-04 PROCEDURE — 3017F COLORECTAL CA SCREEN DOC REV: CPT | Performed by: OBSTETRICS & GYNECOLOGY

## 2021-02-04 PROCEDURE — G9899 SCRN MAM PERF RSLTS DOC: HCPCS | Performed by: OBSTETRICS & GYNECOLOGY

## 2021-02-04 PROCEDURE — 1101F PT FALLS ASSESS-DOCD LE1/YR: CPT | Performed by: OBSTETRICS & GYNECOLOGY

## 2021-02-04 PROCEDURE — 99214 OFFICE O/P EST MOD 30 MIN: CPT | Performed by: OBSTETRICS & GYNECOLOGY

## 2021-02-04 PROCEDURE — G8400 PT W/DXA NO RESULTS DOC: HCPCS | Performed by: OBSTETRICS & GYNECOLOGY

## 2021-02-04 PROCEDURE — G8754 DIAS BP LESS 90: HCPCS | Performed by: OBSTETRICS & GYNECOLOGY

## 2021-02-04 PROCEDURE — G8417 CALC BMI ABV UP PARAM F/U: HCPCS | Performed by: OBSTETRICS & GYNECOLOGY

## 2021-02-04 PROCEDURE — 1090F PRES/ABSN URINE INCON ASSESS: CPT | Performed by: OBSTETRICS & GYNECOLOGY

## 2021-02-04 NOTE — PROGRESS NOTES
[address] [place] [address] [address], [address]  P [phone]  F [phone]    Office Note  Patient ID:  Name:  Tamiko Marrero  MRN:  [id number]  :  1955/66 y.o. Date:  2021      HISTORY OF PRESENT ILLNESS:  Luz Valerio is a 77 y.o.  postmenopausal female who is an established patient with a diagnosis of stage IA uterine carcinosarcoma. She underwent robotic hysterectomy with staging in late 2018. Pathology revealed LVSI but her sentinel nodes were negative. Pelvic washings were also negative. She was recommended adjuvant chemotherapy with Taxol/Carboplatin due to her risk of recurrence. She completed her 6 prescribed cycles of chemotherapy. She subsequently received pelvic radiation therapy with brachytherapy. She presents today for follow up. She is doing well. She has some urinary symptoms, but no GI complaints. She has no pain. Denies vaginal bleeding or discharge. ROS:   and GI review:  Negative  Cardiopulmonary review:  Negative   Musculoskeletal:  Negative    A comprehensive review of systems was negative except for that written in the History of Present Illness. , 10 point ROS        Problem List:  Patient Active Problem List    Diagnosis Date Noted    Anemia 2018    Severe obesity (BMI 35.0-39. 9) with comorbidity ([address] 2018    Elevated serum creatinine 2018    Uterine carcinosarcoma ([address] 2018    Obesity (BMI 30.0-34.9) 2018    Hypertension 2017    Chronic lung disease 2017    Stroke ([address] 2017     PMH:  Past Medical History:   Diagnosis Date    Cancer ([address]     UTERINE CA    Chronic pain     Hypercholesterolemia     Hypertension     Spastic hemiparesis of right dominant side ([address]     Stroke ([address] 2007    R SIDE AFFECTED      PSH:  Past Surgical History:   Procedure Laterality Date    COLONOSCOPY,DIAGNOSTIC  2015         HX BREAST BIOPSY Right 1993    right breast:  benign    HX GI      COLONOSCOPY    HX ORTHOPAEDIC      GREAT L TOE BONE SPUR REMOVED      Social History:  Social History     Tobacco Use    Smoking status: Former Smoker     Quit date: 10/4/1991     Years since quittin.3    Smokeless tobacco: Never Used   Substance Use Topics    Alcohol use: No      Family History:  Family History   Problem Relation Age of Onset    Heart Disease Mother     Hypertension Mother     Diabetes Mother     Cancer Mother 62        breast cancer    Breast Cancer Mother         52's    Cancer Father 61        lung cancer    Stroke Brother     Stroke Maternal Grandmother     Stroke Maternal Grandfather     Kidney Disease Brother     Hypertension Brother       Medications: (reviewed)  Current Outpatient Medications   Medication Sig    fluticasone propionate (FLONASE ALLERGY RELIEF) 50 mcg/actuation nasal spray 2 Sprays by Both Nostrils route daily.  ferrous sulfate 325 mg (65 mg iron) tablet TAKE 1 TABLET A DAY    SENNA-S 8.6-50 mg per tablet TK 1 T PO daily    lidocaine-prilocaine (EMLA) topical cream Apply small amount over port area one hour before chemo treatment and cover with a Band-Aid    amLODIPine (NORVASC) 10 mg tablet Take 5 mg by mouth daily.  meclizine (ANTIVERT) 25 mg tablet Take 25 mg by mouth as needed.  traMADol (ULTRAM) 50 mg tablet Take 50 mg by mouth nightly.  aspirin (ASPIRIN) 325 mg tablet Take 325 mg by mouth daily.  atorvastatin (LIPITOR) 10 mg tablet Take 10 mg by mouth daily.  metoprolol (LOPRESSOR) 50 mg tablet Take 50 mg by mouth two (2) times a day.  oxybutynin (DITROPAN) 5 mg tablet Take 15 mg by mouth two (2) times a day. No current facility-administered medications for this visit. Allergies: (reviewed)  Allergies   Allergen Reactions    Latex Itching    Pcn [Penicillins] Unknown (comments)     Pt's states she doesn't know reaction.           OBJECTIVE:    Physical Exam:  VITAL SIGNS: Vitals:    02/04/21 1126   BP: 113/75   Pulse: 70   Weight: 239 lb (108.4 kg)   Height: 5' 8.5\" (1.74 m)     Body mass index is 35.81 kg/m². GENERAL MELVIN: Conversant, alert, oriented. No acute distress. HEENT: HEENT. No thyroid enlargement. No JVD. Neck: Supple without restrictions. RESPIRATORY: Clear to auscultation and percussion to the bases. No CVAT. CARDIOVASC: RRR without murmur/rub. GASTROINT: soft, non-tender, without masses or organomegaly   MUSCULOSKEL: no joint tenderness, deformity or swelling   EXTREMITIES: extremities normal, atraumatic, no cyanosis or edema   PELVIC: Normal external genitalia. Normal vagina. Uterus, cervix, and adnexa absent. No masses or nodularity. RECTAL: Deferred   JOHN SURVEY: No suspicious lymphadenopathy or edema noted. NEURO: Grossly intact. No acute deficit. Lab Results   Component Value Date/Time    WBC 5.9 12/07/2018 10:31 AM    HGB 11.1 (L) 12/07/2018 10:31 AM    HCT 36.1 12/07/2018 10:31 AM    PLATELET 756 96/27/4645 10:31 AM    MCV 98.9 12/07/2018 10:31 AM     Lab Results   Component Value Date/Time    Sodium 139 12/07/2018 10:31 AM    Potassium 4.3 12/07/2018 10:31 AM    Chloride 105 12/07/2018 10:31 AM    CO2 26 12/07/2018 10:31 AM    Anion gap 8 12/07/2018 10:31 AM    Glucose 93 12/07/2018 10:31 AM    BUN 18 12/07/2018 10:31 AM    Creatinine 1.17 (H) 12/07/2018 10:31 AM    BUN/Creatinine ratio 15 12/07/2018 10:31 AM    GFR est AA 57 (L) 12/07/2018 10:31 AM    GFR est non-AA 47 (L) 12/07/2018 10:31 AM    Calcium 9.3 12/07/2018 10:31 AM     Lab Results   Component Value Date/Time    CA-125 12 01/10/2020 11:17 AM    Cancer Ag (CA) 125 27.0 05/21/2018 11:04 AM       PET/CT (7/23/18)  HEAD/NECK: No apparent foci of abnormal hypermetabolism. Cerebral evaluation is  limited by normal intense activity. Old left MCA infarct is noted.     CHEST: No foci of abnormal hypermetabolism.     ABDOMEN/PELVIS: No foci of abnormal hypermetabolism.  Left adrenal nodules are  stable and do not demonstrate increased tracer activity. The uterus is  surgically absent.     SKELETON: No foci of abnormal hypermetabolism in the axial and visualized  appendicular skeleton.     IMPRESSION: No Foci of Abnormal Hypermetabolism. IMPRESSION/PLAN:  Luz Moore is a 77 y.o. female with a diagnosis of stage IA uterine carcinosarcoma. Following surgical resection she received 6 cycles of Taxol/Carboplatin chemotherapy, followed by pelvic radiation therapy. She has no evidence of disease on today's exam.  I will see her back in 6 months for continued surveillance. An electronic signature was used to sign this note.     Ele Shen MD  02/04/21

## 2021-06-07 ENCOUNTER — TRANSCRIBE ORDER (OUTPATIENT)
Dept: SCHEDULING | Age: 66
End: 2021-06-07

## 2021-06-07 DIAGNOSIS — Z12.31 VISIT FOR SCREENING MAMMOGRAM: ICD-10-CM

## 2021-06-07 DIAGNOSIS — Z78.0 POSTMENOPAUSAL STATUS (AGE-RELATED) (NATURAL): Primary | ICD-10-CM

## 2021-07-14 ENCOUNTER — HOSPITAL ENCOUNTER (OUTPATIENT)
Dept: MAMMOGRAPHY | Age: 66
Discharge: HOME | End: 2021-07-14
Attending: INTERNAL MEDICINE
Payer: MEDICARE

## 2021-07-14 DIAGNOSIS — Z78.0 POSTMENOPAUSAL STATUS (AGE-RELATED) (NATURAL): ICD-10-CM

## 2021-07-14 DIAGNOSIS — Z12.31 VISIT FOR SCREENING MAMMOGRAM: ICD-10-CM

## 2021-07-14 PROCEDURE — 77067 SCR MAMMO BI INCL CAD: CPT

## 2021-07-14 PROCEDURE — 77080 DXA BONE DENSITY AXIAL: CPT

## 2021-07-20 ENCOUNTER — HOSPITAL ENCOUNTER (OUTPATIENT)
Dept: MAMMOGRAPHY | Age: 66
Discharge: HOME | End: 2021-07-20
Attending: INTERNAL MEDICINE
Payer: MEDICARE

## 2021-07-20 DIAGNOSIS — R92.8 ABNORMAL MAMMOGRAM OF LEFT BREAST: ICD-10-CM

## 2021-07-20 PROCEDURE — 77065 DX MAMMO INCL CAD UNI: CPT

## 2021-12-23 ENCOUNTER — HOSPITAL ENCOUNTER (OUTPATIENT)
Dept: GENERAL RADIOLOGY | Age: 66
Discharge: HOME | End: 2021-12-23
Payer: MEDICARE

## 2021-12-23 ENCOUNTER — TRANSCRIBE ORDER (OUTPATIENT)
Dept: GENERAL RADIOLOGY | Age: 66
End: 2021-12-23
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
## 2021-12-23 PROCEDURE — 73562 X-RAY EXAM OF KNEE 3: CPT

## 2021-12-27 ENCOUNTER — TRANSCRIBE ORDER (OUTPATIENT)
Dept: SCHEDULING | Age: 66
End: 2021-12-27

## 2021-12-27 DIAGNOSIS — N60.32 FIBROSCLEROSIS OF LEFT BREAST: ICD-10-CM

## 2021-12-27 DIAGNOSIS — N63.0 BREAST MASS: Primary | ICD-10-CM

## 2022-01-19 ENCOUNTER — HOSPITAL ENCOUNTER (OUTPATIENT)
Dept: MAMMOGRAPHY | Age: 67
Discharge: HOME | End: 2022-01-19
Attending: STUDENT IN AN ORGANIZED HEALTH CARE EDUCATION/TRAINING PROGRAM
Payer: MEDICARE

## 2022-01-19 DIAGNOSIS — N60.32 FIBROSCLEROSIS OF LEFT BREAST: ICD-10-CM

## 2022-01-19 PROCEDURE — 77065 DX MAMMO INCL CAD UNI: CPT

## 2022-01-26 NOTE — PROGRESS NOTES
Six month check up for history of a uterine carcinosarcoma. Pt states no abnormal spotting, bleeding or pain. 1. Have you been to the ER, urgent care clinic since your last visit? Hospitalized since your last visit?no  2. Have you seen or consulted any other health care providers outside of the [address] [place] [address] since your last visit? Include any pap smears or colon screening.  no

## 2022-01-27 ENCOUNTER — OFFICE VISIT (OUTPATIENT)
Dept: GYNECOLOGY | Age: 67
End: 2022-01-27
Payer: MEDICARE

## 2022-01-27 VITALS
BODY MASS INDEX: 36.29 KG/M2 | DIASTOLIC BLOOD PRESSURE: 82 MMHG | HEIGHT: 69 IN | HEART RATE: 68 BPM | WEIGHT: 245 LBS | SYSTOLIC BLOOD PRESSURE: 122 MMHG

## 2022-01-27 DIAGNOSIS — C55 UTERINE CARCINOSARCOMA (HCC): Primary | ICD-10-CM

## 2022-01-27 PROCEDURE — 1090F PRES/ABSN URINE INCON ASSESS: CPT | Performed by: OBSTETRICS & GYNECOLOGY

## 2022-01-27 PROCEDURE — G8536 NO DOC ELDER MAL SCRN: HCPCS | Performed by: OBSTETRICS & GYNECOLOGY

## 2022-01-27 PROCEDURE — 1101F PT FALLS ASSESS-DOCD LE1/YR: CPT | Performed by: OBSTETRICS & GYNECOLOGY

## 2022-01-27 PROCEDURE — 3017F COLORECTAL CA SCREEN DOC REV: CPT | Performed by: OBSTETRICS & GYNECOLOGY

## 2022-01-27 PROCEDURE — G9899 SCRN MAM PERF RSLTS DOC: HCPCS | Performed by: OBSTETRICS & GYNECOLOGY

## 2022-01-27 PROCEDURE — G8754 DIAS BP LESS 90: HCPCS | Performed by: OBSTETRICS & GYNECOLOGY

## 2022-01-27 PROCEDURE — G8427 DOCREV CUR MEDS BY ELIG CLIN: HCPCS | Performed by: OBSTETRICS & GYNECOLOGY

## 2022-01-27 PROCEDURE — G8752 SYS BP LESS 140: HCPCS | Performed by: OBSTETRICS & GYNECOLOGY

## 2022-01-27 PROCEDURE — 99213 OFFICE O/P EST LOW 20 MIN: CPT | Performed by: OBSTETRICS & GYNECOLOGY

## 2022-01-27 PROCEDURE — G8399 PT W/DXA RESULTS DOCUMENT: HCPCS | Performed by: OBSTETRICS & GYNECOLOGY

## 2022-01-27 PROCEDURE — G8432 DEP SCR NOT DOC, RNG: HCPCS | Performed by: OBSTETRICS & GYNECOLOGY

## 2022-01-27 PROCEDURE — G8417 CALC BMI ABV UP PARAM F/U: HCPCS | Performed by: OBSTETRICS & GYNECOLOGY
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
## 2022-03-18 PROBLEM — I10 HYPERTENSION: Status: ACTIVE | Noted: 2017-05-08

## 2022-03-19 PROBLEM — I63.9 STROKE (HCC): Status: ACTIVE | Noted: 2017-05-08

## 2022-03-19 PROBLEM — E66.811 OBESITY (BMI 30.0-34.9): Status: ACTIVE | Noted: 2018-01-18

## 2022-03-19 PROBLEM — E66.9 OBESITY (BMI 30.0-34.9): Status: ACTIVE | Noted: 2018-01-18

## 2022-03-19 PROBLEM — E66.01 SEVERE OBESITY (BMI 35.0-39.9) WITH COMORBIDITY (HCC): Status: ACTIVE | Noted: 2018-04-26

## 2022-03-19 PROBLEM — R79.89 ELEVATED SERUM CREATININE: Status: ACTIVE | Noted: 2018-01-31

## 2022-03-20 PROBLEM — J98.4 CHRONIC LUNG DISEASE: Status: ACTIVE | Noted: 2017-05-08

## 2022-03-20 PROBLEM — C55 UTERINE CARCINOSARCOMA (HCC): Status: ACTIVE | Noted: 2018-01-18

## 2022-03-20 PROBLEM — D64.9 ANEMIA: Status: ACTIVE | Noted: 2018-06-13

## 2022-06-28 ENCOUNTER — TRANSCRIBE ORDER (OUTPATIENT)
Dept: SCHEDULING | Age: 67
End: 2022-06-28

## 2022-06-28 DIAGNOSIS — Z12.31 BREAST CANCER SCREENING BY MAMMOGRAM: Primary | ICD-10-CM

## 2022-07-01 ENCOUNTER — HOSPITAL ENCOUNTER (OUTPATIENT)
Dept: GENERAL RADIOLOGY | Age: 67
Discharge: HOME | End: 2022-07-01
Payer: MEDICARE

## 2022-07-01 ENCOUNTER — TRANSCRIBE ORDER (OUTPATIENT)
Dept: REGISTRATION | Age: 67
End: 2022-07-01

## 2022-07-01 DIAGNOSIS — G62.89 DISEASE RELATED PERIPHERAL NEUROPATHY: ICD-10-CM

## 2022-07-01 DIAGNOSIS — M79.673 FOOT PAIN: Primary | ICD-10-CM

## 2022-07-01 DIAGNOSIS — M79.673 FOOT PAIN: ICD-10-CM

## 2022-07-01 PROCEDURE — 73630 X-RAY EXAM OF FOOT: CPT

## 2022-08-04 ENCOUNTER — HOSPITAL ENCOUNTER (OUTPATIENT)
Dept: MAMMOGRAPHY | Age: 67
Discharge: HOME | End: 2022-08-04
Attending: STUDENT IN AN ORGANIZED HEALTH CARE EDUCATION/TRAINING PROGRAM
Payer: MEDICARE

## 2022-08-04 DIAGNOSIS — Z12.31 BREAST CANCER SCREENING BY MAMMOGRAM: ICD-10-CM

## 2022-08-04 PROCEDURE — 77067 SCR MAMMO BI INCL CAD: CPT

## 2022-08-17 NOTE — PROGRESS NOTES
Six month check up for history of uterine carcinosarcoma. Pt states no abnormal spotting, bleeding or pain. 1. Have you been to the ER, urgent care clinic since your last visit? Hospitalized since your last visit?no  2. Have you seen or consulted any other health care providers outside of the [address] [place] [address] since your last visit? Include any pap smears or colon screening.  no

## 2022-08-18 ENCOUNTER — OFFICE VISIT (OUTPATIENT)
Dept: GYNECOLOGY | Age: 67
End: 2022-08-18
Payer: MEDICARE

## 2022-08-18 VITALS
DIASTOLIC BLOOD PRESSURE: 85 MMHG | SYSTOLIC BLOOD PRESSURE: 135 MMHG | HEIGHT: 69 IN | WEIGHT: 242 LBS | BODY MASS INDEX: 35.84 KG/M2

## 2022-08-18 DIAGNOSIS — C55 UTERINE CARCINOSARCOMA (HCC): Primary | ICD-10-CM

## 2022-08-18 PROCEDURE — G8536 NO DOC ELDER MAL SCRN: HCPCS | Performed by: OBSTETRICS & GYNECOLOGY

## 2022-08-18 PROCEDURE — G8399 PT W/DXA RESULTS DOCUMENT: HCPCS | Performed by: OBSTETRICS & GYNECOLOGY

## 2022-08-18 PROCEDURE — 1101F PT FALLS ASSESS-DOCD LE1/YR: CPT | Performed by: OBSTETRICS & GYNECOLOGY

## 2022-08-18 PROCEDURE — G8432 DEP SCR NOT DOC, RNG: HCPCS | Performed by: OBSTETRICS & GYNECOLOGY

## 2022-08-18 PROCEDURE — 3017F COLORECTAL CA SCREEN DOC REV: CPT | Performed by: OBSTETRICS & GYNECOLOGY

## 2022-08-18 PROCEDURE — 1090F PRES/ABSN URINE INCON ASSESS: CPT | Performed by: OBSTETRICS & GYNECOLOGY

## 2022-08-18 PROCEDURE — G8417 CALC BMI ABV UP PARAM F/U: HCPCS | Performed by: OBSTETRICS & GYNECOLOGY

## 2022-08-18 PROCEDURE — 1123F ACP DISCUSS/DSCN MKR DOCD: CPT | Performed by: OBSTETRICS & GYNECOLOGY

## 2022-08-18 PROCEDURE — G8754 DIAS BP LESS 90: HCPCS | Performed by: OBSTETRICS & GYNECOLOGY

## 2022-08-18 PROCEDURE — G8752 SYS BP LESS 140: HCPCS | Performed by: OBSTETRICS & GYNECOLOGY

## 2022-08-18 PROCEDURE — G8427 DOCREV CUR MEDS BY ELIG CLIN: HCPCS | Performed by: OBSTETRICS & GYNECOLOGY

## 2022-08-18 PROCEDURE — 99213 OFFICE O/P EST LOW 20 MIN: CPT | Performed by: OBSTETRICS & GYNECOLOGY

## 2022-08-18 PROCEDURE — G9899 SCRN MAM PERF RSLTS DOC: HCPCS | Performed by: OBSTETRICS & GYNECOLOGY
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
## 2023-03-02 ENCOUNTER — OFFICE VISIT (OUTPATIENT)
Dept: GYNECOLOGY | Age: 68
End: 2023-03-02
Payer: MEDICARE

## 2023-03-02 VITALS
BODY MASS INDEX: 35.4 KG/M2 | WEIGHT: 239 LBS | SYSTOLIC BLOOD PRESSURE: 110 MMHG | HEART RATE: 101 BPM | DIASTOLIC BLOOD PRESSURE: 76 MMHG | HEIGHT: 69 IN

## 2023-03-02 DIAGNOSIS — C55 UTERINE CARCINOSARCOMA (HCC): Primary | ICD-10-CM

## 2023-03-02 PROCEDURE — G8432 DEP SCR NOT DOC, RNG: HCPCS | Performed by: OBSTETRICS & GYNECOLOGY

## 2023-03-02 PROCEDURE — G8399 PT W/DXA RESULTS DOCUMENT: HCPCS | Performed by: OBSTETRICS & GYNECOLOGY

## 2023-03-02 PROCEDURE — G9899 SCRN MAM PERF RSLTS DOC: HCPCS | Performed by: OBSTETRICS & GYNECOLOGY

## 2023-03-02 PROCEDURE — 3074F SYST BP LT 130 MM HG: CPT | Performed by: OBSTETRICS & GYNECOLOGY

## 2023-03-02 PROCEDURE — G8536 NO DOC ELDER MAL SCRN: HCPCS | Performed by: OBSTETRICS & GYNECOLOGY

## 2023-03-02 PROCEDURE — 1090F PRES/ABSN URINE INCON ASSESS: CPT | Performed by: OBSTETRICS & GYNECOLOGY

## 2023-03-02 PROCEDURE — 99213 OFFICE O/P EST LOW 20 MIN: CPT | Performed by: OBSTETRICS & GYNECOLOGY

## 2023-03-02 PROCEDURE — G8427 DOCREV CUR MEDS BY ELIG CLIN: HCPCS | Performed by: OBSTETRICS & GYNECOLOGY

## 2023-03-02 PROCEDURE — 1101F PT FALLS ASSESS-DOCD LE1/YR: CPT | Performed by: OBSTETRICS & GYNECOLOGY

## 2023-03-02 PROCEDURE — 1123F ACP DISCUSS/DSCN MKR DOCD: CPT | Performed by: OBSTETRICS & GYNECOLOGY

## 2023-03-02 PROCEDURE — G8417 CALC BMI ABV UP PARAM F/U: HCPCS | Performed by: OBSTETRICS & GYNECOLOGY

## 2023-03-02 PROCEDURE — 3078F DIAST BP <80 MM HG: CPT | Performed by: OBSTETRICS & GYNECOLOGY

## 2023-03-02 PROCEDURE — 3017F COLORECTAL CA SCREEN DOC REV: CPT | Performed by: OBSTETRICS & GYNECOLOGY

## 2023-03-02 RX ORDER — NYSTATIN 100000 [USP'U]/G
POWDER TOPICAL 4 TIMES DAILY
COMMUNITY

## 2023-03-02 RX ORDER — DICLOFENAC SODIUM 10 MG/G
GEL TOPICAL 4 TIMES DAILY
COMMUNITY

## 2023-03-02 RX ORDER — CLOPIDOGREL BISULFATE 75 MG/1
TABLET ORAL
COMMUNITY
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
## 2023-04-03 ENCOUNTER — HOSPITAL ENCOUNTER (OUTPATIENT)
Dept: MRI IMAGING | Age: 68
End: 2023-04-03
Attending: STUDENT IN AN ORGANIZED HEALTH CARE EDUCATION/TRAINING PROGRAM
Payer: MEDICARE

## 2023-04-03 ENCOUNTER — TRANSCRIBE ORDER (OUTPATIENT)
Dept: SCHEDULING | Age: 68
End: 2023-04-03

## 2023-04-03 DIAGNOSIS — L97.509 FOOT ULCER (HCC): Primary | ICD-10-CM

## 2023-04-03 DIAGNOSIS — L97.509 FOOT ULCER (HCC): ICD-10-CM

## 2023-04-03 LAB — CREAT BLD-MCNC: 1.5 MG/DL (ref 0.6–1.3)

## 2023-04-03 PROCEDURE — A9576 INJ PROHANCE MULTIPACK: HCPCS

## 2023-04-03 PROCEDURE — 82565 ASSAY OF CREATININE: CPT

## 2023-04-03 PROCEDURE — 74011250636 HC RX REV CODE- 250/636

## 2023-04-03 PROCEDURE — 73720 MRI LWR EXTREMITY W/O&W/DYE: CPT

## 2023-04-03 RX ADMIN — GADOTERIDOL 20 ML: 279.3 INJECTION, SOLUTION INTRAVENOUS at 15:00

## 2023-04-06 PROBLEM — L08.9 RIGHT FOOT INFECTION: Status: ACTIVE | Noted: 2023-04-06

## 2023-05-01 ENCOUNTER — HOSPITAL ENCOUNTER (INPATIENT)
Age: 68
LOS: 2 days | Discharge: HOME | DRG: 603 | End: 2023-05-03
Attending: EMERGENCY MEDICINE | Admitting: HOSPITALIST
Payer: MEDICARE

## 2023-05-01 ENCOUNTER — APPOINTMENT (OUTPATIENT)
Dept: VASCULAR SURGERY | Age: 68
DRG: 603 | End: 2023-05-01
Attending: PODIATRIST
Payer: MEDICARE

## 2023-05-01 DIAGNOSIS — L03.031 CELLULITIS OF TOE, RIGHT: Primary | ICD-10-CM

## 2023-05-01 PROBLEM — L03.115 CELLULITIS OF RIGHT FOOT: Status: ACTIVE | Noted: 2023-05-01

## 2023-05-01 LAB
ALBUMIN SERPL-MCNC: 3.6 G/DL (ref 3.5–5)
ALBUMIN/GLOB SERPL: 0.8 (ref 1.1–2.2)
ALP SERPL-CCNC: 111 U/L (ref 45–117)
ALT SERPL-CCNC: 23 U/L (ref 12–78)
ANION GAP SERPL CALC-SCNC: 4 MMOL/L (ref 5–15)
AST SERPL-CCNC: 31 U/L (ref 15–37)
BASOPHILS # BLD: 0 K/UL (ref 0–0.1)
BASOPHILS NFR BLD: 0 % (ref 0–1)
BILIRUB SERPL-MCNC: 0.9 MG/DL (ref 0.2–1)
BUN SERPL-MCNC: 20 MG/DL (ref 6–20)
BUN/CREAT SERPL: 14 (ref 12–20)
CALCIUM SERPL-MCNC: 9.7 MG/DL (ref 8.5–10.1)
CHLORIDE SERPL-SCNC: 108 MMOL/L (ref 97–108)
CO2 SERPL-SCNC: 25 MMOL/L (ref 21–32)
CREAT SERPL-MCNC: 1.45 MG/DL (ref 0.55–1.02)
DIFFERENTIAL METHOD BLD: ABNORMAL
EOSINOPHIL # BLD: 0.9 K/UL (ref 0–0.4)
EOSINOPHIL NFR BLD: 11 % (ref 0–7)
ERYTHROCYTE [DISTWIDTH] IN BLOOD BY AUTOMATED COUNT: 14.1 % (ref 11.5–14.5)
GLOBULIN SER CALC-MCNC: 4.3 G/DL (ref 2–4)
GLUCOSE SERPL-MCNC: 100 MG/DL (ref 65–100)
HCT VFR BLD AUTO: 44.4 % (ref 35–47)
HGB BLD-MCNC: 14 G/DL (ref 11.5–16)
IMM GRANULOCYTES # BLD AUTO: 0 K/UL (ref 0–0.04)
IMM GRANULOCYTES NFR BLD AUTO: 0 % (ref 0–0.5)
LACTATE SERPL-SCNC: 1.2 MMOL/L (ref 0.4–2)
LACTATE SERPL-SCNC: 1.2 MMOL/L (ref 0.4–2)
LYMPHOCYTES # BLD: 1.6 K/UL (ref 0.8–3.5)
LYMPHOCYTES NFR BLD: 20 % (ref 12–49)
MCH RBC QN AUTO: 30.3 PG (ref 26–34)
MCHC RBC AUTO-ENTMCNC: 31.5 G/DL (ref 30–36.5)
MCV RBC AUTO: 96.1 FL (ref 80–99)
MONOCYTES # BLD: 0.6 K/UL (ref 0–1)
MONOCYTES NFR BLD: 8 % (ref 5–13)
NEUTS SEG # BLD: 4.8 K/UL (ref 1.8–8)
NEUTS SEG NFR BLD: 61 % (ref 32–75)
NRBC # BLD: 0 K/UL (ref 0–0.01)
NRBC BLD-RTO: 0 PER 100 WBC
PLATELET # BLD AUTO: 294 K/UL (ref 150–400)
PMV BLD AUTO: 10.3 FL (ref 8.9–12.9)
POTASSIUM SERPL-SCNC: 4.1 MMOL/L (ref 3.5–5.1)
PROT SERPL-MCNC: 7.9 G/DL (ref 6.4–8.2)
RBC # BLD AUTO: 4.62 M/UL (ref 3.8–5.2)
RBC MORPH BLD: ABNORMAL
SODIUM SERPL-SCNC: 137 MMOL/L (ref 136–145)
WBC # BLD AUTO: 7.9 K/UL (ref 3.6–11)

## 2023-05-01 PROCEDURE — 65270000029 HC RM PRIVATE

## 2023-05-01 PROCEDURE — 74011000250 HC RX REV CODE- 250: Performed by: EMERGENCY MEDICINE

## 2023-05-01 PROCEDURE — 74011250636 HC RX REV CODE- 250/636: Performed by: EMERGENCY MEDICINE

## 2023-05-01 PROCEDURE — 99285 EMERGENCY DEPT VISIT HI MDM: CPT

## 2023-05-01 PROCEDURE — 96374 THER/PROPH/DIAG INJ IV PUSH: CPT

## 2023-05-01 PROCEDURE — 74011000250 HC RX REV CODE- 250: Performed by: HOSPITALIST

## 2023-05-01 PROCEDURE — 74011250637 HC RX REV CODE- 250/637: Performed by: HOSPITALIST

## 2023-05-01 PROCEDURE — 93922 UPR/L XTREMITY ART 2 LEVELS: CPT

## 2023-05-01 PROCEDURE — 85025 COMPLETE CBC W/AUTO DIFF WBC: CPT

## 2023-05-01 PROCEDURE — 80053 COMPREHEN METABOLIC PANEL: CPT

## 2023-05-01 PROCEDURE — 87040 BLOOD CULTURE FOR BACTERIA: CPT

## 2023-05-01 PROCEDURE — 83605 ASSAY OF LACTIC ACID: CPT

## 2023-05-01 PROCEDURE — 96375 TX/PRO/DX INJ NEW DRUG ADDON: CPT

## 2023-05-01 PROCEDURE — 36415 COLL VENOUS BLD VENIPUNCTURE: CPT

## 2023-05-01 RX ORDER — SODIUM CHLORIDE 0.9 % (FLUSH) 0.9 %
5-40 SYRINGE (ML) INJECTION AS NEEDED
Status: DISCONTINUED | OUTPATIENT
Start: 2023-05-01 | End: 2023-05-03 | Stop reason: HOSPADM

## 2023-05-01 RX ORDER — ENOXAPARIN SODIUM 100 MG/ML
30 INJECTION SUBCUTANEOUS 2 TIMES DAILY
Status: DISCONTINUED | OUTPATIENT
Start: 2023-05-02 | End: 2023-05-03 | Stop reason: HOSPADM

## 2023-05-01 RX ORDER — SODIUM CHLORIDE 0.9 % (FLUSH) 0.9 %
5-40 SYRINGE (ML) INJECTION EVERY 8 HOURS
Status: DISCONTINUED | OUTPATIENT
Start: 2023-05-01 | End: 2023-05-03 | Stop reason: HOSPADM

## 2023-05-01 RX ORDER — ACETAMINOPHEN 650 MG/1
650 SUPPOSITORY RECTAL
Status: DISCONTINUED | OUTPATIENT
Start: 2023-05-01 | End: 2023-05-03 | Stop reason: HOSPADM

## 2023-05-01 RX ORDER — UREA 10 %
325 LOTION (ML) TOPICAL DAILY
Status: DISCONTINUED | OUTPATIENT
Start: 2023-05-02 | End: 2023-05-03 | Stop reason: HOSPADM

## 2023-05-01 RX ORDER — TRAMADOL HYDROCHLORIDE 50 MG/1
50 TABLET ORAL
Status: DISCONTINUED | OUTPATIENT
Start: 2023-05-01 | End: 2023-05-02

## 2023-05-01 RX ORDER — GUAIFENESIN 100 MG/5ML
81 LIQUID (ML) ORAL DAILY
Status: DISCONTINUED | OUTPATIENT
Start: 2023-05-02 | End: 2023-05-03 | Stop reason: HOSPADM

## 2023-05-01 RX ORDER — OXYCODONE AND ACETAMINOPHEN 5; 325 MG/1; MG/1
1 TABLET ORAL
Status: DISCONTINUED | OUTPATIENT
Start: 2023-05-01 | End: 2023-05-02

## 2023-05-01 RX ORDER — ACETAMINOPHEN 325 MG/1
650 TABLET ORAL
Status: DISCONTINUED | OUTPATIENT
Start: 2023-05-01 | End: 2023-05-03 | Stop reason: HOSPADM

## 2023-05-01 RX ORDER — ONDANSETRON 2 MG/ML
4 INJECTION INTRAMUSCULAR; INTRAVENOUS
Status: DISCONTINUED | OUTPATIENT
Start: 2023-05-01 | End: 2023-05-03 | Stop reason: HOSPADM

## 2023-05-01 RX ORDER — ATORVASTATIN CALCIUM 40 MG/1
40 TABLET, FILM COATED ORAL DAILY
Status: DISCONTINUED | OUTPATIENT
Start: 2023-05-02 | End: 2023-05-03 | Stop reason: HOSPADM

## 2023-05-01 RX ORDER — AMLODIPINE BESYLATE 5 MG/1
5 TABLET ORAL DAILY
Status: DISCONTINUED | OUTPATIENT
Start: 2023-05-02 | End: 2023-05-03 | Stop reason: HOSPADM

## 2023-05-01 RX ORDER — ONDANSETRON 4 MG/1
4 TABLET, ORALLY DISINTEGRATING ORAL
Status: DISCONTINUED | OUTPATIENT
Start: 2023-05-01 | End: 2023-05-03 | Stop reason: HOSPADM

## 2023-05-01 RX ORDER — OXYBUTYNIN CHLORIDE 5 MG/1
15 TABLET ORAL 2 TIMES DAILY
Status: DISCONTINUED | OUTPATIENT
Start: 2023-05-01 | End: 2023-05-03 | Stop reason: HOSPADM

## 2023-05-01 RX ORDER — POLYETHYLENE GLYCOL 3350 17 G/17G
17 POWDER, FOR SOLUTION ORAL DAILY PRN
Status: DISCONTINUED | OUTPATIENT
Start: 2023-05-01 | End: 2023-05-03 | Stop reason: HOSPADM

## 2023-05-01 RX ORDER — CLOPIDOGREL BISULFATE 75 MG/1
75 TABLET ORAL DAILY
Status: DISCONTINUED | OUTPATIENT
Start: 2023-05-02 | End: 2023-05-03 | Stop reason: HOSPADM

## 2023-05-01 RX ORDER — METOPROLOL TARTRATE 50 MG/1
50 TABLET ORAL 2 TIMES DAILY
Status: DISCONTINUED | OUTPATIENT
Start: 2023-05-01 | End: 2023-05-03 | Stop reason: HOSPADM

## 2023-05-01 RX ADMIN — OXYCODONE HYDROCHLORIDE AND ACETAMINOPHEN 1 TABLET: 5; 325 TABLET ORAL at 18:08

## 2023-05-01 RX ADMIN — METOPROLOL TARTRATE 50 MG: 50 TABLET, FILM COATED ORAL at 18:04

## 2023-05-01 RX ADMIN — OXYCODONE HYDROCHLORIDE AND ACETAMINOPHEN 1 TABLET: 5; 325 TABLET ORAL at 23:57

## 2023-05-01 RX ADMIN — VANCOMYCIN HYDROCHLORIDE 2500 MG: 10 INJECTION, POWDER, LYOPHILIZED, FOR SOLUTION INTRAVENOUS at 13:28

## 2023-05-01 RX ADMIN — Medication 10 ML: at 13:25

## 2023-05-01 RX ADMIN — OXYBUTYNIN CHLORIDE 15 MG: 5 TABLET ORAL at 18:04

## 2023-05-01 RX ADMIN — SODIUM CHLORIDE 2 G: 9 INJECTION INTRAMUSCULAR; INTRAVENOUS; SUBCUTANEOUS at 13:22

## 2023-05-01 RX ADMIN — Medication 10 ML: at 21:03

## 2023-05-01 RX ADMIN — TRAMADOL HYDROCHLORIDE 50 MG: 50 TABLET, COATED ORAL at 21:03

## 2023-05-01 NOTE — CONSULTS
Podiatry Brief Consult  -Pt seen & examined at bedside in ER. Denies any n/v/f/ns/c.  -Pt known to me from her previous admission. States since she was discharged last time, she never saw her outpatient podiatrist, but did follow up with her PCP. States she became worried when the toe started turning darker. -MRI pending.    -Will order ANASTASIA/PVRs.  -Continue antibxs.    -Will follow

## 2023-05-01 NOTE — ED NOTES
Has a final transfer review been performed?  Yes    Reason for Admission: Cellulitis of right toe  Patient comes from: home  Mental Status: Alert and oriented  ADL:self care  Ambulation:no difficulty  Pertinent Info/Safety Concerns:   COVID Status: Not Tested  MEWS Score: 0     Vitals:    05/01/23 1318 05/01/23 1402 05/01/23 1404 05/01/23 1500   BP:  (!) 141/84  (!) 147/82   Pulse: 67 81  79   Resp: 17 20  13   Temp:    97.1 °F (36.2 °C)   SpO2:   99% 99%   Weight: 104.4 kg (230 lb 2.6 oz)  104.4 kg (230 lb 2.6 oz)    Height:   5' 8\" (1.727 m)    LMP: 08/01/2007     Lines:   Venous Access Device 03/16/18 Upper chest (subclavicular area), left (Active)       Peripheral IV 05/01/23 Left Antecubital (Active)   Site Assessment Clean, dry, & intact 05/01/23 1227   Phlebitis Assessment 0 05/01/23 1227   Infiltration Assessment 0 05/01/23 1227   Dressing Status Clean, dry, & intact 05/01/23 1227   Dressing Type Transparent 05/01/23 1227   Hub Color/Line Status Blue;Flushed;Patent 05/01/23 1227   Action Taken Blood drawn 05/01/23 1227      Transport mode:ED stretcher    [address]  being transferred to [facility](unit) for routine progression of care     \"Notification of etransfer note given to (Name) Genie Varma (Title) NELSY
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
## 2023-05-01 NOTE — ED TRIAGE NOTES
Pt stated she is here for her right 3rd toe infection, seen here 3 weeks ago for the same, pt stated her toe is black now, denies fever, denies drainage

## 2023-05-01 NOTE — PROGRESS NOTES
Pharmacist Note - Vancomycin Dosing    Consult provided for this 76 y.o. female for indication of SSTI right toe. Antibiotic regimen(s): cefepime, vanc  Patient on vancomycin PTA? NO     Recent Labs     23  1223 23  1052   WBC 7.9  --    CREA  --  1.45*   BUN  --  20     Frequency of BMP: every day through   Height: 172.7 cm  Weight: 104.4 kg  Est CrCl: 47 ml/min; UO: n/a ml/kg/hr  Temp (24hrs), Av.9 °F (36.1 °C), Min:96.9 °F (36.1 °C), Max:96.9 °F (36.1 °C)    Cultures:   blood: pend    MRSA Swab ordered (if applicable)? N/A    The plan below is expected to result in a target range of AUC/REAL 400-600    Therapy will be initiated with a loading dose of 2500 mg IV x 1 to be followed by a maintenance dose of 1250 mg IV every 24 hours. Pharmacy to follow patient daily and order levels / make dose adjustments as appropriate. *Vancomycin has been dosed used Bayesian kinetics software to target an AUC/REAL of 400-600, which provides adequate exposure for an assumed infection due to MRSA with an REAL of 1 or less while reducing the risk of nephrotoxicity as seen with traditional trough based dosing goals.
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
## 2023-05-01 NOTE — PROGRESS NOTES
Lovenox Monitoring  Indication: DVT Prophylaxis  Recent Labs     05/01/23  1223 05/01/23  1052   HGB 14.0  --      --    CREA  --  1.45*     Current Weight: 104.4 kg  Est. CrCl = 47.4 ml/min  Current Dose: 40 mg subcutaneously every 24 hours.   Plan: Change to 30 mg BID with respect to weight 101-150.9 kg and CrCl > 30 mL/min

## 2023-05-01 NOTE — PROGRESS NOTES
Clinical Pharmacy Note - Extended Infusion Cefepime Dosing    This patient has been ordered cefepime at 1 g IV every 8 hours. P&T protocol allows automatic substitution to extended-infusion cefepime and dose adjustment based on indication and renal function. Indication:   SSTI  CrCl: 47 mL/min    Per P&T protocol, the cefepime will initiated with a 2 gram loading dose (given IV push over 3 minutes) which will be followed by a maintenance regimen of 2 gram Q 24 hours (each dose will be infused over 4 hours). Pharmacy will continue to monitor this patient daily for changes in clinical status and renal function. Please call pharmacy with any questions.

## 2023-05-02 ENCOUNTER — APPOINTMENT (OUTPATIENT)
Dept: MRI IMAGING | Age: 68
DRG: 603 | End: 2023-05-02
Attending: HOSPITALIST
Payer: MEDICARE

## 2023-05-02 LAB
ANION GAP SERPL CALC-SCNC: 4 MMOL/L (ref 5–15)
BUN SERPL-MCNC: 19 MG/DL (ref 6–20)
BUN/CREAT SERPL: 16 (ref 12–20)
CALCIUM SERPL-MCNC: 9.1 MG/DL (ref 8.5–10.1)
CHLORIDE SERPL-SCNC: 109 MMOL/L (ref 97–108)
CO2 SERPL-SCNC: 26 MMOL/L (ref 21–32)
CREAT SERPL-MCNC: 1.19 MG/DL (ref 0.55–1.02)
GLUCOSE SERPL-MCNC: 85 MG/DL (ref 65–100)
LEFT ABI: 1.16
LEFT ARM BP: 142 MMHG
LEFT POSTERIOR TIBIAL: 153 MMHG
POTASSIUM SERPL-SCNC: 4.3 MMOL/L (ref 3.5–5.1)
RIGHT ABI: 0.87
RIGHT POSTERIOR TIBIAL: 124 MMHG
SODIUM SERPL-SCNC: 139 MMOL/L (ref 136–145)
VAS LEFT DORSALIS PEDIS BP: 165 MMHG
VAS RIGHT DORSALIS PEDIS BP: 108 MMHG

## 2023-05-02 PROCEDURE — 36415 COLL VENOUS BLD VENIPUNCTURE: CPT

## 2023-05-02 PROCEDURE — 74011250637 HC RX REV CODE- 250/637: Performed by: NURSE PRACTITIONER

## 2023-05-02 PROCEDURE — 74011000258 HC RX REV CODE- 258: Performed by: HOSPITALIST

## 2023-05-02 PROCEDURE — 74011250637 HC RX REV CODE- 250/637: Performed by: HOSPITALIST

## 2023-05-02 PROCEDURE — A9576 INJ PROHANCE MULTIPACK: HCPCS

## 2023-05-02 PROCEDURE — 74011250636 HC RX REV CODE- 250/636

## 2023-05-02 PROCEDURE — 74011250636 HC RX REV CODE- 250/636: Performed by: HOSPITALIST

## 2023-05-02 PROCEDURE — 73720 MRI LWR EXTREMITY W/O&W/DYE: CPT

## 2023-05-02 PROCEDURE — 74011000250 HC RX REV CODE- 250: Performed by: NURSE PRACTITIONER

## 2023-05-02 PROCEDURE — 65270000029 HC RM PRIVATE

## 2023-05-02 PROCEDURE — 74011000250 HC RX REV CODE- 250: Performed by: HOSPITALIST

## 2023-05-02 PROCEDURE — 80048 BASIC METABOLIC PNL TOTAL CA: CPT

## 2023-05-02 RX ORDER — SODIUM CHLORIDE 0.9 % (FLUSH) 0.9 %
10 SYRINGE (ML) INJECTION
Status: COMPLETED | OUTPATIENT
Start: 2023-05-02 | End: 2023-05-02

## 2023-05-02 RX ORDER — TRAMADOL HYDROCHLORIDE 50 MG/1
50 TABLET ORAL 4 TIMES DAILY
Status: DISCONTINUED | OUTPATIENT
Start: 2023-05-02 | End: 2023-05-03 | Stop reason: HOSPADM

## 2023-05-02 RX ADMIN — OXYBUTYNIN CHLORIDE 15 MG: 5 TABLET ORAL at 09:33

## 2023-05-02 RX ADMIN — CLOPIDOGREL BISULFATE 75 MG: 75 TABLET ORAL at 09:33

## 2023-05-02 RX ADMIN — METOPROLOL TARTRATE 50 MG: 50 TABLET, FILM COATED ORAL at 18:20

## 2023-05-02 RX ADMIN — GADOTERIDOL 20 ML: 279.3 INJECTION, SOLUTION INTRAVENOUS at 17:20

## 2023-05-02 RX ADMIN — ASPIRIN 81 MG CHEWABLE TABLET 81 MG: 81 TABLET CHEWABLE at 09:33

## 2023-05-02 RX ADMIN — OXYBUTYNIN CHLORIDE 15 MG: 5 TABLET ORAL at 18:20

## 2023-05-02 RX ADMIN — Medication 10 ML: at 06:00

## 2023-05-02 RX ADMIN — AMLODIPINE BESYLATE 5 MG: 5 TABLET ORAL at 09:33

## 2023-05-02 RX ADMIN — Medication 10 ML: at 21:02

## 2023-05-02 RX ADMIN — TRAMADOL HYDROCHLORIDE 50 MG: 50 TABLET, COATED ORAL at 18:20

## 2023-05-02 RX ADMIN — CEFEPIME 2 G: 2 INJECTION, POWDER, FOR SOLUTION INTRAVENOUS at 12:12

## 2023-05-02 RX ADMIN — SODIUM CHLORIDE, PRESERVATIVE FREE 10 ML: 5 INJECTION INTRAVENOUS at 17:21

## 2023-05-02 RX ADMIN — Medication 10 ML: at 14:00

## 2023-05-02 RX ADMIN — ATORVASTATIN CALCIUM 40 MG: 40 TABLET, FILM COATED ORAL at 09:33

## 2023-05-02 RX ADMIN — OXYCODONE HYDROCHLORIDE AND ACETAMINOPHEN 1 TABLET: 5; 325 TABLET ORAL at 09:33

## 2023-05-02 RX ADMIN — METOPROLOL TARTRATE 50 MG: 50 TABLET, FILM COATED ORAL at 09:33

## 2023-05-02 RX ADMIN — TRAMADOL HYDROCHLORIDE 50 MG: 50 TABLET, COATED ORAL at 21:02

## 2023-05-02 RX ADMIN — VANCOMYCIN HYDROCHLORIDE 1250 MG: 1.25 INJECTION, POWDER, LYOPHILIZED, FOR SOLUTION INTRAVENOUS at 12:16

## 2023-05-03 VITALS
SYSTOLIC BLOOD PRESSURE: 150 MMHG | BODY MASS INDEX: 34.88 KG/M2 | HEIGHT: 68 IN | WEIGHT: 230.16 LBS | OXYGEN SATURATION: 97 % | DIASTOLIC BLOOD PRESSURE: 79 MMHG | TEMPERATURE: 97.5 F | HEART RATE: 60 BPM | RESPIRATION RATE: 17 BRPM

## 2023-05-03 PROBLEM — L03.115 CELLULITIS OF RIGHT FOOT: Status: RESOLVED | Noted: 2023-05-01 | Resolved: 2023-05-03

## 2023-05-03 LAB
ANION GAP SERPL CALC-SCNC: 3 MMOL/L (ref 5–15)
BUN SERPL-MCNC: 16 MG/DL (ref 6–20)
BUN/CREAT SERPL: 15 (ref 12–20)
CALCIUM SERPL-MCNC: 9.2 MG/DL (ref 8.5–10.1)
CHLORIDE SERPL-SCNC: 109 MMOL/L (ref 97–108)
CO2 SERPL-SCNC: 27 MMOL/L (ref 21–32)
CREAT SERPL-MCNC: 1.06 MG/DL (ref 0.55–1.02)
GLUCOSE SERPL-MCNC: 86 MG/DL (ref 65–100)
POTASSIUM SERPL-SCNC: 4.1 MMOL/L (ref 3.5–5.1)
SODIUM SERPL-SCNC: 139 MMOL/L (ref 136–145)
VANCOMYCIN SERPL-MCNC: 18.3 UG/ML

## 2023-05-03 PROCEDURE — 74011000250 HC RX REV CODE- 250: Performed by: HOSPITALIST

## 2023-05-03 PROCEDURE — 74011000258 HC RX REV CODE- 258: Performed by: NURSE PRACTITIONER

## 2023-05-03 PROCEDURE — 74011250637 HC RX REV CODE- 250/637: Performed by: HOSPITALIST

## 2023-05-03 PROCEDURE — 74011250636 HC RX REV CODE- 250/636: Performed by: NURSE PRACTITIONER

## 2023-05-03 PROCEDURE — 80048 BASIC METABOLIC PNL TOTAL CA: CPT

## 2023-05-03 PROCEDURE — 80202 ASSAY OF VANCOMYCIN: CPT

## 2023-05-03 PROCEDURE — 36415 COLL VENOUS BLD VENIPUNCTURE: CPT

## 2023-05-03 PROCEDURE — 74011250637 HC RX REV CODE- 250/637: Performed by: NURSE PRACTITIONER

## 2023-05-03 RX ORDER — DOXYCYCLINE 100 MG/1
100 CAPSULE ORAL 2 TIMES DAILY
Qty: 20 CAPSULE | Refills: 0 | Status: SHIPPED | OUTPATIENT
Start: 2023-05-03 | End: 2023-05-13

## 2023-05-03 RX ADMIN — ASPIRIN 81 MG CHEWABLE TABLET 81 MG: 81 TABLET CHEWABLE at 10:04

## 2023-05-03 RX ADMIN — ATORVASTATIN CALCIUM 40 MG: 40 TABLET, FILM COATED ORAL at 10:04

## 2023-05-03 RX ADMIN — AMLODIPINE BESYLATE 5 MG: 5 TABLET ORAL at 10:04

## 2023-05-03 RX ADMIN — CEFEPIME 2 G: 2 INJECTION, POWDER, FOR SOLUTION INTRAVENOUS at 10:04

## 2023-05-03 RX ADMIN — METOPROLOL TARTRATE 50 MG: 50 TABLET, FILM COATED ORAL at 10:05

## 2023-05-03 RX ADMIN — OXYBUTYNIN CHLORIDE 15 MG: 5 TABLET ORAL at 10:04

## 2023-05-03 RX ADMIN — CLOPIDOGREL BISULFATE 75 MG: 75 TABLET ORAL at 10:04

## 2023-05-03 RX ADMIN — Medication 10 ML: at 06:00

## 2023-05-03 RX ADMIN — TRAMADOL HYDROCHLORIDE 50 MG: 50 TABLET, COATED ORAL at 10:07

## 2023-05-06 LAB
BACTERIA SPEC CULT: NORMAL
SERVICE CMNT-IMP: NORMAL

## 2023-05-23 RX ORDER — CLOPIDOGREL BISULFATE 75 MG/1
TABLET ORAL
COMMUNITY

## 2023-05-23 RX ORDER — ATORVASTATIN CALCIUM 10 MG/1
40 TABLET, FILM COATED ORAL DAILY
COMMUNITY

## 2023-05-23 RX ORDER — OXYBUTYNIN CHLORIDE 5 MG/1
15 TABLET ORAL 2 TIMES DAILY
COMMUNITY

## 2023-05-23 RX ORDER — NYSTATIN 100000 [USP'U]/G
POWDER TOPICAL 4 TIMES DAILY
COMMUNITY

## 2023-05-23 RX ORDER — METOPROLOL TARTRATE 50 MG/1
50 TABLET, FILM COATED ORAL 2 TIMES DAILY
COMMUNITY

## 2023-05-23 RX ORDER — FLUTICASONE PROPIONATE 50 MCG
2 SPRAY, SUSPENSION (ML) NASAL DAILY
COMMUNITY

## 2023-05-23 RX ORDER — AMLODIPINE BESYLATE 10 MG/1
5 TABLET ORAL DAILY
COMMUNITY

## 2023-05-23 RX ORDER — TRAMADOL HYDROCHLORIDE 50 MG/1
1 TABLET ORAL NIGHTLY
COMMUNITY

## 2023-05-23 RX ORDER — LIDOCAINE AND PRILOCAINE 25; 25 MG/G; MG/G
CREAM TOPICAL
COMMUNITY
Start: 2018-03-06

## 2023-11-30 ENCOUNTER — HOSPITAL ENCOUNTER (OUTPATIENT)
Facility: HOSPITAL | Age: 68
Discharge: HOME | End: 2023-11-30
Payer: MEDICARE

## 2023-11-30 VITALS — HEIGHT: 68 IN | BODY MASS INDEX: 34.86 KG/M2 | WEIGHT: 230 LBS

## 2023-11-30 DIAGNOSIS — Z12.31 SCREENING MAMMOGRAM FOR HIGH-RISK PATIENT: ICD-10-CM

## 2023-11-30 PROCEDURE — 77063 BREAST TOMOSYNTHESIS BI: CPT

## 2023-12-14 ENCOUNTER — TRANSCRIBE ORDERS (OUTPATIENT)
Facility: HOSPITAL | Age: 68
End: 2023-12-14

## 2023-12-14 DIAGNOSIS — I82.502 CHRONIC DEEP VEIN THROMBOSIS (DVT) OF LEFT LOWER EXTREMITY, UNSPECIFIED VEIN (HCC): Primary | ICD-10-CM

## 2024-01-04 ENCOUNTER — HOSPITAL ENCOUNTER (OUTPATIENT)
Facility: HOSPITAL | Age: 69
Discharge: HOME | End: 2024-01-04
Payer: MEDICARE

## 2024-01-04 DIAGNOSIS — I82.502 CHRONIC DEEP VEIN THROMBOSIS (DVT) OF LEFT LOWER EXTREMITY, UNSPECIFIED VEIN (HCC): ICD-10-CM

## 2024-01-04 PROCEDURE — 93970 EXTREMITY STUDY: CPT

## 2024-03-07 ENCOUNTER — OFFICE VISIT (OUTPATIENT)
Age: 69
End: 2024-03-07
Payer: MEDICARE

## 2024-03-07 VITALS
BODY MASS INDEX: 35.92 KG/M2 | WEIGHT: 237 LBS | DIASTOLIC BLOOD PRESSURE: 84 MMHG | HEART RATE: 75 BPM | HEIGHT: 68 IN | SYSTOLIC BLOOD PRESSURE: 145 MMHG

## 2024-03-07 DIAGNOSIS — R35.0 FREQUENT URINATION: ICD-10-CM

## 2024-03-07 DIAGNOSIS — Z85.42 HISTORY OF ENDOMETRIAL CANCER: Primary | ICD-10-CM

## 2024-03-07 PROCEDURE — 3017F COLORECTAL CA SCREEN DOC REV: CPT | Performed by: OBSTETRICS & GYNECOLOGY

## 2024-03-07 PROCEDURE — G8427 DOCREV CUR MEDS BY ELIG CLIN: HCPCS | Performed by: OBSTETRICS & GYNECOLOGY

## 2024-03-07 PROCEDURE — 1036F TOBACCO NON-USER: CPT | Performed by: OBSTETRICS & GYNECOLOGY

## 2024-03-07 PROCEDURE — G8399 PT W/DXA RESULTS DOCUMENT: HCPCS | Performed by: OBSTETRICS & GYNECOLOGY

## 2024-03-07 PROCEDURE — 3077F SYST BP >= 140 MM HG: CPT | Performed by: OBSTETRICS & GYNECOLOGY

## 2024-03-07 PROCEDURE — 1123F ACP DISCUSS/DSCN MKR DOCD: CPT | Performed by: OBSTETRICS & GYNECOLOGY

## 2024-03-07 PROCEDURE — 99212 OFFICE O/P EST SF 10 MIN: CPT | Performed by: OBSTETRICS & GYNECOLOGY

## 2024-03-07 PROCEDURE — G8484 FLU IMMUNIZE NO ADMIN: HCPCS | Performed by: OBSTETRICS & GYNECOLOGY

## 2024-03-07 PROCEDURE — G8419 CALC BMI OUT NRM PARAM NOF/U: HCPCS | Performed by: OBSTETRICS & GYNECOLOGY

## 2024-03-07 PROCEDURE — 1090F PRES/ABSN URINE INCON ASSESS: CPT | Performed by: OBSTETRICS & GYNECOLOGY

## 2024-03-07 PROCEDURE — 3079F DIAST BP 80-89 MM HG: CPT | Performed by: OBSTETRICS & GYNECOLOGY

## 2024-03-07 RX ORDER — TIZANIDINE 2 MG/1
2 TABLET ORAL EVERY 6 HOURS PRN
COMMUNITY

## 2024-03-07 NOTE — PROGRESS NOTES
Check up. Pt states no abnormal spotting, bleeding or pain. Patient thinks she may have a uti. She c/o frequent urination.   1. Have you been to the ER, urgent care clinic since your last visit?  Hospitalized since your last visit?no    2. Have you seen or consulted any other health care providers outside of the [facility] since your last visit?  Include any pap smears or colon screening. no    
kidney. No hydronephrosis bilaterally.  STOMACH: Unremarkable.  SMALL BOWEL: No dilatation or wall thickening.  COLON: Large stool burden  APPENDIX: Unremarkable  PERITONEUM: No ascites or pneumoperitoneum.  RETROPERITONEUM: Atherosclerotic disease.  REPRODUCTIVE ORGANS:  URINARY BLADDER: No mass or calculus.  BONES: No destructive bone lesion.  ABDOMINAL WALL: No mass or hernia.  ADDITIONAL COMMENTS: N/A     IMPRESSION:  No acute intra-abdominal pathology.      IMPRESSION/PLAN:  Eri Moscoso is a 69 y.o. female with a history of stage IA uterine carcinosarcoma.  Following surgical resection she received 6 cycles of Taxol/Carboplatin chemotherapy, followed by pelvic radiation therapy.  She has no evidence of disease on today's exam.  I will see her back in one year for continued surveillance.       An electronic signature was used to sign this note.    Shaun Dela Cruz MD  03/07/24

## 2024-04-25 ENCOUNTER — TELEPHONE (OUTPATIENT)
Age: 69
End: 2024-04-25

## 2024-04-25 DIAGNOSIS — N30.01 ACUTE CYSTITIS WITH HEMATURIA: Primary | ICD-10-CM

## 2024-04-25 RX ORDER — LEVOFLOXACIN 250 MG/1
250 TABLET, FILM COATED ORAL DAILY
Qty: 3 TABLET | Refills: 0 | Status: SHIPPED | OUTPATIENT
Start: 2024-04-25 | End: 2024-04-28

## 2024-04-25 NOTE — TELEPHONE ENCOUNTER
Called the patient, left a message to advise that I called and was able to get her results of the urinalysis. Advised per AMAIRANI Clarke that it does show a UTI and she is sending in an antibiotic to treat. I advised to please return my phone call to confirm.

## 2024-11-20 ENCOUNTER — TRANSCRIBE ORDERS (OUTPATIENT)
Facility: HOSPITAL | Age: 69
End: 2024-11-20

## 2024-11-20 DIAGNOSIS — Z12.31 VISIT FOR SCREENING MAMMOGRAM: Primary | ICD-10-CM

## 2024-12-12 ENCOUNTER — HOSPITAL ENCOUNTER (OUTPATIENT)
Facility: HOSPITAL | Age: 69
Discharge: HOME | End: 2024-12-15
Payer: MEDICARE

## 2024-12-12 VITALS — WEIGHT: 237 LBS | HEIGHT: 68 IN | BODY MASS INDEX: 35.92 KG/M2

## 2024-12-12 DIAGNOSIS — Z12.31 VISIT FOR SCREENING MAMMOGRAM: ICD-10-CM

## 2024-12-12 PROCEDURE — 77067 SCR MAMMO BI INCL CAD: CPT

## 2025-04-14 ENCOUNTER — TRANSCRIBE ORDERS (OUTPATIENT)
Facility: HOSPITAL | Age: 70
End: 2025-04-14

## 2025-04-14 DIAGNOSIS — M81.0 POST-MENOPAUSAL OSTEOPOROSIS: Primary | ICD-10-CM

## 2025-05-01 ENCOUNTER — HOSPITAL ENCOUNTER (OUTPATIENT)
Facility: HOSPITAL | Age: 70
Discharge: HOME | End: 2025-05-01
Payer: MEDICARE

## 2025-05-01 VITALS — HEIGHT: 68 IN | BODY MASS INDEX: 30.16 KG/M2 | WEIGHT: 199 LBS

## 2025-05-01 DIAGNOSIS — M81.0 POST-MENOPAUSAL OSTEOPOROSIS: ICD-10-CM

## 2025-05-01 PROCEDURE — 77080 DXA BONE DENSITY AXIAL: CPT

## 2025-09-05 ENCOUNTER — TELEPHONE (OUTPATIENT)
Age: 70
End: 2025-09-05

## (undated) DEVICE — DEVON™ KNEE AND BODY STRAP 60" X 3" (1.5 M X 7.6 CM): Brand: DEVON

## (undated) DEVICE — GLOVE SURG SZ 75 L1212IN FNGR THK138MIL BRN LTX FREE

## (undated) DEVICE — SUTURE MCRYL SZ 4-0 L27IN ABSRB UD L19MM PS-2 1/2 CIR PRIM Y426H

## (undated) DEVICE — CATH FOL TY IC BAG 16FR 2000ML -- CONVERT TO ITEM 363158

## (undated) DEVICE — REM POLYHESIVE ADULT PATIENT RETURN ELECTRODE: Brand: VALLEYLAB

## (undated) DEVICE — PAD SANIT NPKN 4IN GRD

## (undated) DEVICE — BLADELESS OBTURATOR

## (undated) DEVICE — ARM DRAPE

## (undated) DEVICE — GAUZE SPONGES,8 PLY: Brand: CURITY

## (undated) DEVICE — STRAP,POSITIONING,KNEE/BODY,FOAM,4X60": Brand: MEDLINE

## (undated) DEVICE — STERILE NEOPRENE POWDER-FREE SURGICAL GLOVES WITH NITRILE COATING: Brand: PROTEXIS

## (undated) DEVICE — VISUALIZATION SYSTEM: Brand: CLEARIFY

## (undated) DEVICE — Device

## (undated) DEVICE — TRI-LUMEN FILTERED TUBE SET WITH ACTIVATED CHARCOAL FILTER: Brand: AIRSEAL

## (undated) DEVICE — BLADELESS OBTURATOR: Brand: WECK VISTA

## (undated) DEVICE — SUTURE PROL SZ 2-0 L36IN NONABSORBABLE BLU SH L26MM 1/2 CIR 8523H

## (undated) DEVICE — TRAY PREP DRY W/ PREM GLV 2 APPL 6 SPNG 2 UNDPD 1 OVERWRAP

## (undated) DEVICE — POUCH SPEC RETRV SHFT 15MM 13X23CM GRN POLYUR DBL RNG HNDL

## (undated) DEVICE — SURGICAL PROCEDURE PACK GYN LAPAROSCOPY CUST SMH LF

## (undated) DEVICE — SOLUTION IV 1000ML 0.9% SOD CHL

## (undated) DEVICE — SCISSORS SURG DIA8MM MPLR CRV ENDOWRIST

## (undated) DEVICE — KENDALL SCD EXPRESS SLEEVES, KNEE LENGTH, MEDIUM: Brand: KENDALL SCD

## (undated) DEVICE — SUTURE VCRL SZ 3-0 L27IN ABSRB UD L26MM SH 1/2 CIR J416H

## (undated) DEVICE — SYR 50ML LR LCK 1ML GRAD NSAF --

## (undated) DEVICE — INFECTION CONTROL KIT SYS

## (undated) DEVICE — AIRSEAL 8 MM ACCESS PORT AND LOW PROFILE OBTURATOR WITH BLADELESS OPTICAL TIP, 100 MM LENGTH: Brand: AIRSEAL

## (undated) DEVICE — LIGHT HANDLE: Brand: DEVON

## (undated) DEVICE — DRAPE,LAPAROTOMY,T,PEDI,STERILE: Brand: MEDLINE

## (undated) DEVICE — TIP COVER ACCESSORY

## (undated) DEVICE — ELECTRO LUBE IS A SINGLE PATIENT USE DEVICE THAT IS INTENDED TO BE USED ON ELECTROSURGICAL ELECTRODES TO REDUCE STICKING.: Brand: KEY SURGICAL ELECTRO LUBE

## (undated) DEVICE — 3M™ TEGADERM™ TRANSPARENT FILM DRESSING FRAME STYLE, 1626W, 4 IN X 4-3/4 IN (10 CM X 12 CM), 50/CT 4CT/CASE: Brand: 3M™ TEGADERM™

## (undated) DEVICE — FENESTRATED BIPOLAR FORCEPS: Brand: ENDOWRIST

## (undated) DEVICE — PROGRASP FORCEPS: Brand: ENDOWRIST

## (undated) DEVICE — NEEDLE SPNL 22GA L3.5IN BLK HUB S STL REG WALL FIT STYL W/

## (undated) DEVICE — 3M™ DURAPORE™ SURGICAL TAPE 1538-3, 3 INCH X 10 YARD (7,5CM X 9,1M), 4 ROLLS/BOX: Brand: 3M™ DURAPORE™

## (undated) DEVICE — DRAPE,LAPAROTOMY,PED,STERILE: Brand: MEDLINE

## (undated) DEVICE — (D)PREP SKN CHLRAPRP APPL 26ML -- CONVERT TO ITEM 371833

## (undated) DEVICE — DRAPE XR C ARM 41X74IN LF --

## (undated) DEVICE — AIRSEAL 8 MM ACCESS PORT AND LOW PROFILE OBTURATOR WITH BLADELESS OPTICAL TIP, 120 MM LENGTH: Brand: AIRSEAL

## (undated) DEVICE — 3M™ STERI-STRIP™ REINFORCED ADHESIVE SKIN CLOSURES, R1546, 1/4 IN X 4 IN (6 MM X 100 MM), 10 STRIPS/ENVELOPE: Brand: 3M™ STERI-STRIP™

## (undated) DEVICE — MASTISOL ADHESIVE LIQ 2/3ML

## (undated) DEVICE — DERMABOND SKIN ADH 0.7ML -- DERMABOND ADVANCED 12/BX

## (undated) DEVICE — SUTURE ABSORBABLE MONOFILAMENT 2-0 8 IN ANTIBACT STRATAFIX SXMP1B409

## (undated) DEVICE — 1200 GUARD II KIT W/5MM TUBE W/O VAC TUBE: Brand: GUARDIAN

## (undated) DEVICE — VCARE MEDIUM, UTERINE MANIPULATOR, VAGINAL-CERVICAL-AHLUWALIA'S-RETRACTOR-ELEVATOR: Brand: VCARE

## (undated) DEVICE — SYR 10ML LUER LOK 1/5ML GRAD --

## (undated) DEVICE — NEEDLE HYPO 25GA L1.5IN BVL ORIENTED ECLIPSE

## (undated) DEVICE — BASIC PACK: Brand: CONVERTORS

## (undated) DEVICE — MEGA SUTURECUT ND: Brand: ENDOWRIST

## (undated) DEVICE — NEEDLE INSUF L120MM DIA2MM DISP FOR PNEUMOPERI ENDOPATH

## (undated) DEVICE — SOLUTION IV 500ML 0.9% SOD CHL FLX CONT

## (undated) DEVICE — DRAPE,REIN 53X77,STERILE: Brand: MEDLINE

## (undated) DEVICE — SEAL UNIV 5-8MM DISP BX/10 -- DA VINCI XI - SNGL USE

## (undated) DEVICE — COVER LT HNDL PLAS RIG 1 PER PK